# Patient Record
Sex: FEMALE | Race: WHITE | NOT HISPANIC OR LATINO | Employment: OTHER | URBAN - METROPOLITAN AREA
[De-identification: names, ages, dates, MRNs, and addresses within clinical notes are randomized per-mention and may not be internally consistent; named-entity substitution may affect disease eponyms.]

---

## 2017-04-12 RX ORDER — LEVOTHYROXINE SODIUM 50 UG/1
TABLET ORAL
Qty: 30 TABLET | Refills: 11 | Status: SHIPPED | OUTPATIENT
Start: 2017-04-12 | End: 2018-03-21 | Stop reason: SDUPTHER

## 2017-05-22 ENCOUNTER — TELEPHONE (OUTPATIENT)
Dept: OBSTETRICS AND GYNECOLOGY | Facility: CLINIC | Age: 67
End: 2017-05-22

## 2017-05-22 NOTE — TELEPHONE ENCOUNTER
----- Message from Maria D Carver sent at 5/22/2017 11:07 AM CDT -----  Contact: Nathaniel/Robert Mora [phone]  Nathaniel is needing a call back regarding this pt Rx, she can be reached at [phone].

## 2017-06-28 ENCOUNTER — HOSPITAL ENCOUNTER (OUTPATIENT)
Dept: RADIOLOGY | Facility: HOSPITAL | Age: 67
Discharge: HOME | End: 2017-06-28
Attending: INTERNAL MEDICINE
Payer: COMMERCIAL

## 2017-06-28 VITALS — BODY MASS INDEX: 24.8 KG/M2 | WEIGHT: 140 LBS | HEIGHT: 63 IN

## 2017-06-28 DIAGNOSIS — Z12.31 VISIT FOR SCREENING MAMMOGRAM: ICD-10-CM

## 2017-06-28 DIAGNOSIS — Z00.00 ROUTINE GENERAL MEDICAL EXAMINATION AT A HEALTH CARE FACILITY: ICD-10-CM

## 2017-06-28 PROCEDURE — 77067 SCR MAMMO BI INCL CAD: CPT | Mod: 26,,, | Performed by: RADIOLOGY

## 2017-06-28 PROCEDURE — 77067 SCR MAMMO BI INCL CAD: CPT | Mod: TC

## 2017-06-28 PROCEDURE — 77063 BREAST TOMOSYNTHESIS BI: CPT | Mod: 26,,, | Performed by: RADIOLOGY

## 2017-06-30 RX ORDER — ESTRADIOL 2 MG/1
TABLET ORAL
Qty: 30 TABLET | Refills: 3 | Status: SHIPPED | OUTPATIENT
Start: 2017-06-30 | End: 2017-10-29 | Stop reason: SDUPTHER

## 2017-07-28 DIAGNOSIS — Z12.11 COLON CANCER SCREENING: ICD-10-CM

## 2017-08-25 RX ORDER — ATORVASTATIN CALCIUM 40 MG/1
TABLET, FILM COATED ORAL
Qty: 90 TABLET | Refills: 0 | Status: SHIPPED | OUTPATIENT
Start: 2017-08-25 | End: 2017-11-22 | Stop reason: SDUPTHER

## 2017-09-14 ENCOUNTER — CLINICAL SUPPORT (OUTPATIENT)
Dept: INTERNAL MEDICINE | Facility: CLINIC | Age: 67
End: 2017-09-14
Payer: COMMERCIAL

## 2017-09-14 ENCOUNTER — OFFICE VISIT (OUTPATIENT)
Dept: INTERNAL MEDICINE | Facility: CLINIC | Age: 67
End: 2017-09-14
Payer: COMMERCIAL

## 2017-09-14 ENCOUNTER — CLINICAL SUPPORT (OUTPATIENT)
Dept: INFECTIOUS DISEASES | Facility: CLINIC | Age: 67
End: 2017-09-14
Payer: COMMERCIAL

## 2017-09-14 ENCOUNTER — TELEPHONE (OUTPATIENT)
Dept: INTERNAL MEDICINE | Facility: CLINIC | Age: 67
End: 2017-09-14

## 2017-09-14 VITALS
BODY MASS INDEX: 25.76 KG/M2 | DIASTOLIC BLOOD PRESSURE: 82 MMHG | WEIGHT: 145.38 LBS | SYSTOLIC BLOOD PRESSURE: 130 MMHG | HEART RATE: 65 BPM | HEIGHT: 63 IN

## 2017-09-14 DIAGNOSIS — Z00.00 ROUTINE GENERAL MEDICAL EXAMINATION AT A HEALTH CARE FACILITY: Primary | ICD-10-CM

## 2017-09-14 DIAGNOSIS — Z23 NEED FOR VACCINATION: Primary | ICD-10-CM

## 2017-09-14 DIAGNOSIS — E03.9 HYPOTHYROIDISM, UNSPECIFIED TYPE: ICD-10-CM

## 2017-09-14 DIAGNOSIS — Z12.11 COLON CANCER SCREENING: ICD-10-CM

## 2017-09-14 DIAGNOSIS — E28.39 ESTROGEN DEFICIENCY: ICD-10-CM

## 2017-09-14 DIAGNOSIS — Z00.00 ANNUAL PHYSICAL EXAM: Primary | ICD-10-CM

## 2017-09-14 LAB
ALBUMIN SERPL BCP-MCNC: 3.6 G/DL
ALP SERPL-CCNC: 89 U/L
ALT SERPL W/O P-5'-P-CCNC: 13 U/L
ANION GAP SERPL CALC-SCNC: 9 MMOL/L
AST SERPL-CCNC: 18 U/L
BILIRUB SERPL-MCNC: 0.7 MG/DL
BUN SERPL-MCNC: 21 MG/DL
CALCIUM SERPL-MCNC: 9.6 MG/DL
CHLORIDE SERPL-SCNC: 104 MMOL/L
CHOLEST SERPL-MCNC: 203 MG/DL
CHOLEST/HDLC SERPL: 2.9 {RATIO}
CO2 SERPL-SCNC: 27 MMOL/L
CREAT SERPL-MCNC: 1.1 MG/DL
ERYTHROCYTE [DISTWIDTH] IN BLOOD BY AUTOMATED COUNT: 13.5 %
EST. GFR  (AFRICAN AMERICAN): >60 ML/MIN/1.73 M^2
EST. GFR  (NON AFRICAN AMERICAN): 52.8 ML/MIN/1.73 M^2
ESTIMATED AVG GLUCOSE: 105 MG/DL
GLUCOSE SERPL-MCNC: 96 MG/DL
HBA1C MFR BLD HPLC: 5.3 %
HCT VFR BLD AUTO: 38.6 %
HDLC SERPL-MCNC: 71 MG/DL
HDLC SERPL: 35 %
HGB BLD-MCNC: 12.8 G/DL
LDLC SERPL CALC-MCNC: 107.4 MG/DL
MCH RBC QN AUTO: 30.3 PG
MCHC RBC AUTO-ENTMCNC: 33.2 G/DL
MCV RBC AUTO: 92 FL
NONHDLC SERPL-MCNC: 132 MG/DL
PLATELET # BLD AUTO: 303 K/UL
PMV BLD AUTO: 9.7 FL
POTASSIUM SERPL-SCNC: 4.1 MMOL/L
PROT SERPL-MCNC: 7.2 G/DL
RBC # BLD AUTO: 4.22 M/UL
SODIUM SERPL-SCNC: 140 MMOL/L
TRIGL SERPL-MCNC: 123 MG/DL
TSH SERPL DL<=0.005 MIU/L-ACNC: 1.7 UIU/ML
WBC # BLD AUTO: 6.53 K/UL

## 2017-09-14 PROCEDURE — 80061 LIPID PANEL: CPT

## 2017-09-14 PROCEDURE — 99999 PR PBB SHADOW E&M-EST. PATIENT-LVL I: CPT | Mod: PBBFAC,,,

## 2017-09-14 PROCEDURE — 99397 PER PM REEVAL EST PAT 65+ YR: CPT | Mod: S$GLB,ICN,, | Performed by: INTERNAL MEDICINE

## 2017-09-14 PROCEDURE — 90471 IMMUNIZATION ADMIN: CPT | Mod: S$GLB,,, | Performed by: INTERNAL MEDICINE

## 2017-09-14 PROCEDURE — 36415 COLL VENOUS BLD VENIPUNCTURE: CPT

## 2017-09-14 PROCEDURE — 83036 HEMOGLOBIN GLYCOSYLATED A1C: CPT

## 2017-09-14 PROCEDURE — 80053 COMPREHEN METABOLIC PANEL: CPT

## 2017-09-14 PROCEDURE — 84443 ASSAY THYROID STIM HORMONE: CPT

## 2017-09-14 PROCEDURE — 90472 IMMUNIZATION ADMIN EACH ADD: CPT | Mod: S$GLB,,, | Performed by: INTERNAL MEDICINE

## 2017-09-14 PROCEDURE — 90662 IIV NO PRSV INCREASED AG IM: CPT | Mod: S$GLB,,, | Performed by: INTERNAL MEDICINE

## 2017-09-14 PROCEDURE — 99999 PR PBB SHADOW E&M-EST. PATIENT-LVL III: CPT | Mod: PBBFAC,,, | Performed by: INTERNAL MEDICINE

## 2017-09-14 PROCEDURE — 90670 PCV13 VACCINE IM: CPT | Mod: S$GLB,,, | Performed by: INTERNAL MEDICINE

## 2017-09-14 PROCEDURE — 85027 COMPLETE CBC AUTOMATED: CPT

## 2017-09-14 NOTE — LETTER
September 19, 2017    Mercedes Aviles  [address] [place] [address]             [facility]  [address]  [facility] [address]  Phone: [phone]  Fax: [phone] Dear Mrs. Aviles:    Thank you for allowing me to serve you and perform your Executive Health exam on 9/14/2017.  This letter will serve a brief summary of the history, physical findings, and laboratory/studies performed and recommendations at that time.    Reason for Visit: Executive Health Preventive Physical Examination    Past Medical History:   Diagnosis Date    Hyperlipidemia     Hypertension     Meniere disease     Psoriasis        Past Surgical History:   Procedure Laterality Date    CARPAL TUNNEL RELEASE      right     EAR MASTOIDECTOMY W/ COCHLEAR IMPLANT W/ LANDMARK      HYSTERECTOMY      in her 40's    tarsal tunnel      right foot     TONSILLECTOMY         Family History   Problem Relation Age of Onset    Psoriasis Maternal Grandmother     Hypertension Mother     Dementia Mother     Hypertension Sister     Hyperlipidemia Sister     Cancer Sister      uterus/cx    No Known Problems Son     Hyperlipidemia Daughter     Arthritis Daughter     Breast cancer Neg Hx     Ovarian cancer Neg Hx        Social History     Social History    Marital status:      Spouse name: N/A    Number of children: 2    Years of education: N/A     Occupational History    ,       self-employed     [employer]     Social History Main Topics    Smoking status: Never Smoker    Smokeless tobacco: Never Used    Alcohol use No    Drug use: No    Sexual activity: Yes     Birth control/ protection: None     Other Topics Concern    Are You Pregnant Or Think You May Be? No    Breast-Feeding No     Social History Narrative    Exercise:  .          Review of patient's allergies indicates:   Allergen Reactions    Celebrex [celecoxib]     Simvastatin      Other reaction(s):  Rash         Current Outpatient Prescriptions:     aspirin 81 mg Tab, , Disp: , Rfl:     atorvastatin (LIPITOR) 40 MG tablet, TAKE 1 TABLET BY MOUTH ONCE DAILY, Disp: 90 tablet, Rfl: 0    biotin 5 mg Cap, Take by mouth., Disp: , Rfl:     cholecalciferol, vitamin D3, 2,000 unit Cap, Take by mouth., Disp: , Rfl:     clonazePAM (KLONOPIN) 0.5 MG tablet, Take 0.5 mg by mouth once daily., Disp: , Rfl:     COMPOUND HORMONE REPLACEMENT, Take by mouth once daily. Testosterone 5mg  E4M #30  1 daily 5 refills, Disp: 30 capsule, Rfl: 5    desonide (DESOWEN) 0.05 % cream, PAULINA TO FACE BID, Disp: , Rfl: 3    estradiol (ESTRACE) 2 MG tablet, TAKE 1 TABLET BY MOUTH ONCE DAILY, Disp: 30 tablet, Rfl: 3    levothyroxine (SYNTHROID) 50 MCG tablet, TAKE 1 TABLET(50 MCG) BY MOUTH EVERY DAY, Disp: 30 tablet, Rfl: 11    mometasone 0.1% (ELOCON) 0.1 % cream, PAULINA TO FACE BID, Disp: , Rfl: 3    ranitidine (ZANTAC) 150 MG capsule, , Disp: , Rfl: 11    TESTOSTERONE 5 MG CAP, TAKE 1 CAPSULE(S) BY MOUTH daily, Disp: , Rfl: 4    vitamin E 100 UNIT capsule, Take by mouth., Disp: , Rfl:     BETAHISTINE HCL (BETAHISTINE, BULK,) 100 % Powd, , Disp: , Rfl:     Current Facility-Administered Medications:     pneumoc 13-meena conj-dip cr(PF) 0.5 mL, 0.5 mL, Intramuscular, vaccine x 1 dose, Lashawn Sims MD     Review of Systems  Review of Systems - Negative except stable intermittent vertigo.  Hearing has improved following implantation of cochlear implant.  You complained of arm burising after working in garden.      Physical Exam:  General: General appearance: alert, well appearing, and in no distress.   Skin: Skin exam - normal coloration and turgor, no rashes, no suspicious skin lesions noted.  HEENT: Ears - bilateral TM's and external ear canals normal. , ENT exam reveals - ENT exam normal, no neck nodes or sinus tenderness.   Metallic device posterior to R ear.  Lungs: Chest: clear to auscultation, no wheezes, rales or rhonchi,  symmetric air entry.   Heart: CVS exam: normal rate, regular rhythm, normal S1, S2, no murmurs, rubs, clicks or gallops.   Extremities: Exam of extremities: peripheral pulses normal, no pedal edema, no clubbing or cyanosis    Labs:  Results for orders placed or performed in visit on 09/14/17   Comprehensive metabolic panel   Result Value Ref Range    Sodium 140 136 - 145 mmol/L    Potassium 4.1 3.5 - 5.1 mmol/L    Chloride 104 95 - 110 mmol/L    CO2 27 23 - 29 mmol/L    Glucose 96 70 - 110 mg/dL    BUN, Bld 21 8 - 23 mg/dL    Creatinine 1.1 0.5 - 1.4 mg/dL    Calcium 9.6 8.7 - 10.5 mg/dL    Total Protein 7.2 6.0 - 8.4 g/dL    Albumin 3.6 3.5 - 5.2 g/dL    Total Bilirubin 0.7 0.1 - 1.0 mg/dL    Alkaline Phosphatase 89 55 - 135 U/L    AST 18 10 - 40 U/L    ALT 13 10 - 44 U/L    Anion Gap 9 8 - 16 mmol/L    eGFR if African American >60.0 >60 mL/min/1.73 m^2    eGFR if non  52.8 (A) >60 mL/min/1.73 m^2   CBC Without Differential   Result Value Ref Range    WBC 6.53 3.90 - 12.70 K/uL    RBC 4.22 4.00 - 5.40 M/uL    Hemoglobin 12.8 12.0 - 16.0 g/dL    Hematocrit 38.6 37.0 - 48.5 %    MCV 92 82 - 98 fL    MCH 30.3 27.0 - 31.0 pg    MCHC 33.2 32.0 - 36.0 g/dL    RDW 13.5 11.5 - 14.5 %    Platelets 303 150 - 350 K/uL    MPV 9.7 9.2 - 12.9 fL   Lipid panel   Result Value Ref Range    Cholesterol 203 (H) 120 - 199 mg/dL    Triglycerides 123 30 - 150 mg/dL    HDL 71 40 - 75 mg/dL    LDL Cholesterol 107.4 63.0 - 159.0 mg/dL    HDL/Chol Ratio 35.0 20.0 - 50.0 %    Total Cholesterol/HDL Ratio 2.9 2.0 - 5.0    Non-HDL Cholesterol 132 mg/dL   TSH   Result Value Ref Range    TSH 1.695 0.400 - 4.000 uIU/mL   Hemoglobin A1c   Result Value Ref Range    Hemoglobin A1C 5.3 4.0 - 5.6 %    Estimated Avg Glucose 105 68 - 131 mg/dL        Assessment/Recommendations:  Routine Health Maintenance    At this time, you appear to be in good medical condition.  Labs were all fine, including potassium.  Fluvax and prevnar vaccine were  administered and you were advised to have shingles and tetanus vaccines in about 1 month.   Bone density scan and colonoscopy were ordered.    I look forward to seeing you again next year.  Please contact me should you have any questions or concerns regarding physical findings, or my recommendations.    If you have any questions or concerns, please don't hesitate to call.    Sincerely,    Lashawn Sims MD

## 2017-09-14 NOTE — PROGRESS NOTES
Subjective:       Patient ID: Mercedes Aviles is a 67 y.o. female.    Chief Complaint: Executive Health    HPI   Now taking aldactone qod for htn and Meniere's.    Had cochlear implant and hearing had improved.  She has intermittent vertigo.     Also takes Klonopin.     She'd like shingles vaccine.  She is also due for prevnar.    She c/o bruising of arms after working in garden.    She has hypothyroidism.       Review of Systems   Constitutional: Negative for fever and unexpected weight change.   HENT: Negative for congestion and postnasal drip.    Respiratory: Negative for chest tightness, shortness of breath and wheezing.    Cardiovascular: Negative for chest pain and leg swelling.   Gastrointestinal: Negative for abdominal pain, anal bleeding, constipation, diarrhea, nausea and vomiting.   Genitourinary: Negative for dysuria and urgency.   Skin: Negative for rash.   Neurological: Negative for headaches.   Psychiatric/Behavioral: Negative for dysphoric mood and sleep disturbance. The patient is not nervous/anxious.        Objective:      Physical Exam   Constitutional: She is oriented to person, place, and time. She appears well-developed and well-nourished. No distress.   HENT:   Head: Normocephalic and atraumatic.   Right Ear: External ear normal.   Left Ear: External ear normal.   Nose: Nose normal.   Mouth/Throat: Oropharynx is clear and moist.   Hardware post to R ear.   Eyes: Conjunctivae and EOM are normal. Pupils are equal, round, and reactive to light. Right eye exhibits no discharge. Left eye exhibits no discharge. No scleral icterus.   Neck: Normal range of motion. Neck supple. No JVD present. No thyromegaly present.   Cardiovascular: Normal rate, regular rhythm and normal heart sounds.  Exam reveals no gallop.    No murmur heard.  Pulmonary/Chest: Effort normal and breath sounds normal. No respiratory distress. She has no wheezes. She has no rales.   Abdominal: Soft. Bowel sounds are normal. She  exhibits no distension and no mass. There is no tenderness. There is no rebound and no guarding.   Musculoskeletal: Normal range of motion. She exhibits no edema or tenderness.   Lymphadenopathy:     She has no cervical adenopathy.   Neurological: She is alert and oriented to person, place, and time. No cranial nerve deficit. Coordination normal.   Skin: Skin is warm and dry. No rash noted.   Psychiatric: She has a normal mood and affect. Her behavior is normal. Judgment and thought content normal.       Results for orders placed or performed in visit on 09/14/17   Comprehensive metabolic panel   Result Value Ref Range    Sodium 140 136 - 145 mmol/L    Potassium 4.1 3.5 - 5.1 mmol/L    Chloride 104 95 - 110 mmol/L    CO2 27 23 - 29 mmol/L    Glucose 96 70 - 110 mg/dL    BUN, Bld 21 8 - 23 mg/dL    Creatinine 1.1 0.5 - 1.4 mg/dL    Calcium 9.6 8.7 - 10.5 mg/dL    Total Protein 7.2 6.0 - 8.4 g/dL    Albumin 3.6 3.5 - 5.2 g/dL    Total Bilirubin 0.7 0.1 - 1.0 mg/dL    Alkaline Phosphatase 89 55 - 135 U/L    AST 18 10 - 40 U/L    ALT 13 10 - 44 U/L    Anion Gap 9 8 - 16 mmol/L    eGFR if African American >60.0 >60 mL/min/1.73 m^2    eGFR if non  52.8 (A) >60 mL/min/1.73 m^2   CBC Without Differential   Result Value Ref Range    WBC 6.53 3.90 - 12.70 K/uL    RBC 4.22 4.00 - 5.40 M/uL    Hemoglobin 12.8 12.0 - 16.0 g/dL    Hematocrit 38.6 37.0 - 48.5 %    MCV 92 82 - 98 fL    MCH 30.3 27.0 - 31.0 pg    MCHC 33.2 32.0 - 36.0 g/dL    RDW 13.5 11.5 - 14.5 %    Platelets 303 150 - 350 K/uL    MPV 9.7 9.2 - 12.9 fL   Lipid panel   Result Value Ref Range    Cholesterol 203 (H) 120 - 199 mg/dL    Triglycerides 123 30 - 150 mg/dL    HDL 71 40 - 75 mg/dL    LDL Cholesterol 107.4 63.0 - 159.0 mg/dL    HDL/Chol Ratio 35.0 20.0 - 50.0 %    Total Cholesterol/HDL Ratio 2.9 2.0 - 5.0    Non-HDL Cholesterol 132 mg/dL     Assessment:       1. Annual physical exam    2. Estrogen deficiency    3. Colon cancer screening    4.  Hypothyroidism, unspecified type        Plan:       Mercedes was seen today for [email or web address].    Diagnoses and all orders for this visit:    Annual physical exam    Estrogen deficiency  -     DXA Bone Density Spine And Hip; Future    Colon cancer screening  -     Case request GI: COLONOSCOPY    Hypothyroidism, unspecified type       tdap , shingles vaccine 1 mo     Fluvax, prevnar now.

## 2017-10-31 RX ORDER — ESTRADIOL 2 MG/1
TABLET ORAL
Qty: 30 TABLET | Refills: 11 | Status: SHIPPED | OUTPATIENT
Start: 2017-10-31 | End: 2018-10-07 | Stop reason: SDUPTHER

## 2017-11-23 RX ORDER — ATORVASTATIN CALCIUM 40 MG/1
TABLET, FILM COATED ORAL
Qty: 90 TABLET | Refills: 3 | Status: SHIPPED | OUTPATIENT
Start: 2017-11-23 | End: 2018-10-17 | Stop reason: SDUPTHER

## 2017-12-01 DIAGNOSIS — R53.83 FATIGUE, UNSPECIFIED TYPE: ICD-10-CM

## 2017-12-01 DIAGNOSIS — N95.1 MENOPAUSAL SYMPTOMS: Primary | ICD-10-CM

## 2018-01-02 ENCOUNTER — LAB VISIT (OUTPATIENT)
Dept: LAB | Facility: HOSPITAL | Age: 68
End: 2018-01-02
Attending: OBSTETRICS & GYNECOLOGY
Payer: COMMERCIAL

## 2018-01-02 DIAGNOSIS — R53.83 FATIGUE, UNSPECIFIED TYPE: ICD-10-CM

## 2018-01-02 DIAGNOSIS — N95.1 MENOPAUSAL SYMPTOMS: ICD-10-CM

## 2018-01-02 LAB
DHEA-S SERPL-MCNC: 43.8 UG/DL
ESTRADIOL SERPL-MCNC: 68 PG/ML
PROGEST SERPL-MCNC: 1.5 NG/ML
T3FREE SERPL-MCNC: 2.3 PG/ML
T4 FREE SERPL-MCNC: 1.21 NG/DL
TSH SERPL DL<=0.005 MIU/L-ACNC: 1.55 UIU/ML

## 2018-01-02 PROCEDURE — 84443 ASSAY THYROID STIM HORMONE: CPT

## 2018-01-02 PROCEDURE — 84402 ASSAY OF FREE TESTOSTERONE: CPT

## 2018-01-02 PROCEDURE — 82670 ASSAY OF TOTAL ESTRADIOL: CPT

## 2018-01-02 PROCEDURE — 84439 ASSAY OF FREE THYROXINE: CPT

## 2018-01-02 PROCEDURE — 82627 DEHYDROEPIANDROSTERONE: CPT

## 2018-01-02 PROCEDURE — 84144 ASSAY OF PROGESTERONE: CPT

## 2018-01-02 PROCEDURE — 84481 FREE ASSAY (FT-3): CPT

## 2018-01-02 PROCEDURE — 36415 COLL VENOUS BLD VENIPUNCTURE: CPT | Mod: PO

## 2018-01-05 LAB — TESTOST FREE SERPL-MCNC: 0.8 PG/ML

## 2018-02-05 ENCOUNTER — OFFICE VISIT (OUTPATIENT)
Dept: OBSTETRICS AND GYNECOLOGY | Facility: CLINIC | Age: 68
End: 2018-02-05
Attending: OBSTETRICS & GYNECOLOGY
Payer: COMMERCIAL

## 2018-02-05 VITALS
SYSTOLIC BLOOD PRESSURE: 128 MMHG | DIASTOLIC BLOOD PRESSURE: 82 MMHG | BODY MASS INDEX: 26.64 KG/M2 | HEIGHT: 63 IN | WEIGHT: 150.38 LBS

## 2018-02-05 DIAGNOSIS — Z01.419 WELL WOMAN EXAM WITH ROUTINE GYNECOLOGICAL EXAM: Primary | ICD-10-CM

## 2018-02-05 DIAGNOSIS — Z12.4 PAP SMEAR FOR CERVICAL CANCER SCREENING: ICD-10-CM

## 2018-02-05 DIAGNOSIS — N39.3 STRESS INCONTINENCE: ICD-10-CM

## 2018-02-05 PROCEDURE — 99397 PER PM REEVAL EST PAT 65+ YR: CPT | Mod: S$GLB,,, | Performed by: OBSTETRICS & GYNECOLOGY

## 2018-02-05 PROCEDURE — 88175 CYTOPATH C/V AUTO FLUID REDO: CPT

## 2018-02-05 NOTE — Clinical Note
Mallory Mcfarland, I referred this pt to you .  Also offered urogyn but I think she is more interested in your procedure.  Wasn't sure of cost but I told her 2 to 3 K.  She seemed ok with this.  She is perfect candidate.  See my note.  Is on HRT

## 2018-02-05 NOTE — PROGRESS NOTES
Subjective:       Patient ID: Mercedes Aviles is a 67 y.o. female.    Chief Complaint:  Well Woman      History of Present Illness  HPI  This 67 yr old P2 female is here for routine exam.  She does well on Estrace 2mg and provera.  She has had a hyst and last pap was in .  She is up to date with mammogram and will do pap. Her daughter is my patient too and granddaughter is coming in soon.  Her only complaint today is mild to moderate stress incont (she does not wear a pad) and some urge but very mild incont.  We discussed the [facility] Touch and gave infor for Dr Morris and laser.  She understands is cash pay.  I also offererd for her to see Urogyn here and she will think about.  No bleeding. GYN & OB History  Patient's last menstrual period was 2012.   Date of Last Pap: 2014    OB History    Para Term  AB Living   2 2 2         SAB TAB Ectopic Multiple Live Births                  # Outcome Date GA Lbr Pipe/2nd Weight Sex Delivery Anes PTL Lv   2 Term            1 Term                   Review of Systems  Review of Systems   Constitutional: Negative for chills and fever.   Respiratory: Negative for shortness of breath.    Cardiovascular: Negative for chest pain.   Gastrointestinal: Negative for abdominal pain, nausea and vomiting.   Genitourinary: Negative for difficulty urinating, dyspareunia, genital sores, menstrual problem, pelvic pain, vaginal bleeding, vaginal discharge and vaginal pain.   Skin: Negative for wound.   Hematological: Negative for adenopathy.           Objective:    Physical Exam:   Constitutional: She is oriented to person, place, and time. She appears well-developed and well-nourished.    HENT:   Head: Normocephalic.    Eyes: EOM are normal.    Neck: Normal range of motion.    Cardiovascular: Normal rate.     Pulmonary/Chest: Effort normal. She exhibits no mass and no tenderness. Right breast exhibits no inverted nipple, no mass, no skin change and no tenderness.  Left breast exhibits no inverted nipple, no mass, no skin change and no tenderness.        Abdominal: Soft. She exhibits no distension. There is no tenderness.     Genitourinary: Vagina normal. There is no rash, tenderness or lesion on the right labia. There is no rash, tenderness or lesion on the left labia. Uterus is absent. Uterus is not tender. Cervix is normal. Right adnexum displays no mass, no tenderness and no fullness. Left adnexum displays no mass, no tenderness and no fullness. Cervix exhibits no discharge.   Genitourinary Comments: Well estrogenated and good support.  Some mild cystocele with mild U/V angle movement with valsalva.  Pap done.           Musculoskeletal: Normal range of motion.       Neurological: She is alert and oriented to person, place, and time.    Skin: Skin is warm and dry.    Psychiatric: She has a normal mood and affect.          Assessment:        1. Well woman exam with routine gynecological exam    2. Pap smear for cervical cancer screening    3. Stress incontinence               Plan:      Pap today.  Refer to urogyn/Dr Morris at [facility]  Mammogram yearly (she is low risk)  Follow up with me yearly for HRT discussion  Continue current regimen.

## 2018-02-09 ENCOUNTER — TELEPHONE (OUTPATIENT)
Dept: OBSTETRICS AND GYNECOLOGY | Facility: CLINIC | Age: 68
End: 2018-02-09

## 2018-02-09 DIAGNOSIS — B37.31 YEAST VAGINITIS: Primary | ICD-10-CM

## 2018-02-09 RX ORDER — FLUCONAZOLE 150 MG/1
150 TABLET ORAL ONCE
Qty: 1 TABLET | Refills: 1 | Status: SHIPPED | OUTPATIENT
Start: 2018-02-09 | End: 2018-02-09

## 2018-03-21 RX ORDER — LEVOTHYROXINE SODIUM 50 UG/1
TABLET ORAL
Qty: 30 TABLET | Refills: 11 | Status: SHIPPED | OUTPATIENT
Start: 2018-03-21 | End: 2019-04-01 | Stop reason: SDUPTHER

## 2018-07-06 DIAGNOSIS — Z12.11 SPECIAL SCREENING FOR MALIGNANT NEOPLASMS, COLON: Primary | ICD-10-CM

## 2018-07-06 DIAGNOSIS — Z12.11 COLON CANCER SCREENING: ICD-10-CM

## 2018-07-06 DIAGNOSIS — Z00.00 ROUTINE GENERAL MEDICAL EXAMINATION AT A HEALTH CARE FACILITY: Primary | ICD-10-CM

## 2018-07-06 RX ORDER — SODIUM, POTASSIUM,MAG SULFATES 17.5-3.13G
SOLUTION, RECONSTITUTED, ORAL ORAL
Qty: 1 BOTTLE | Refills: 0 | Status: ON HOLD | OUTPATIENT
Start: 2018-07-06 | End: 2018-08-10 | Stop reason: ALTCHOICE

## 2018-08-10 ENCOUNTER — ANESTHESIA (OUTPATIENT)
Dept: ENDOSCOPY | Facility: HOSPITAL | Age: 68
End: 2018-08-10
Payer: COMMERCIAL

## 2018-08-10 ENCOUNTER — ANESTHESIA EVENT (OUTPATIENT)
Dept: ENDOSCOPY | Facility: HOSPITAL | Age: 68
End: 2018-08-10
Payer: COMMERCIAL

## 2018-08-10 ENCOUNTER — HOSPITAL ENCOUNTER (OUTPATIENT)
Facility: HOSPITAL | Age: 68
Discharge: HOME | End: 2018-08-10
Attending: INTERNAL MEDICINE | Admitting: INTERNAL MEDICINE
Payer: COMMERCIAL

## 2018-08-10 ENCOUNTER — SURGERY (OUTPATIENT)
Age: 68
End: 2018-08-10

## 2018-08-10 VITALS
DIASTOLIC BLOOD PRESSURE: 72 MMHG | TEMPERATURE: 98 F | OXYGEN SATURATION: 96 % | RESPIRATION RATE: 17 BRPM | HEIGHT: 62 IN | WEIGHT: 145 LBS | HEART RATE: 57 BPM | BODY MASS INDEX: 26.68 KG/M2 | SYSTOLIC BLOOD PRESSURE: 118 MMHG

## 2018-08-10 DIAGNOSIS — Z12.11 COLON CANCER SCREENING: Primary | ICD-10-CM

## 2018-08-10 PROCEDURE — 25000003 PHARM REV CODE 250: Performed by: INTERNAL MEDICINE

## 2018-08-10 PROCEDURE — G0121 COLON CA SCRN NOT HI RSK IND: HCPCS | Mod: ,,, | Performed by: INTERNAL MEDICINE

## 2018-08-10 PROCEDURE — 37000009 HC ANESTHESIA EA ADD 15 MINS: Performed by: INTERNAL MEDICINE

## 2018-08-10 PROCEDURE — E9220 PRA ENDO ANESTHESIA: HCPCS | Mod: ,,, | Performed by: NURSE ANESTHETIST, CERTIFIED REGISTERED

## 2018-08-10 PROCEDURE — 63600175 PHARM REV CODE 636 W HCPCS: Performed by: NURSE ANESTHETIST, CERTIFIED REGISTERED

## 2018-08-10 PROCEDURE — 37000008 HC ANESTHESIA 1ST 15 MINUTES: Performed by: INTERNAL MEDICINE

## 2018-08-10 PROCEDURE — G0121 COLON CA SCRN NOT HI RSK IND: HCPCS | Performed by: INTERNAL MEDICINE

## 2018-08-10 RX ORDER — PROPOFOL 10 MG/ML
VIAL (ML) INTRAVENOUS CONTINUOUS PRN
Status: DISCONTINUED | OUTPATIENT
Start: 2018-08-10 | End: 2018-08-10

## 2018-08-10 RX ORDER — SODIUM CHLORIDE 0.9 % (FLUSH) 0.9 %
3 SYRINGE (ML) INJECTION
Status: DISCONTINUED | OUTPATIENT
Start: 2018-08-10 | End: 2018-08-10 | Stop reason: HOSPADM

## 2018-08-10 RX ORDER — LIDOCAINE HCL/PF 100 MG/5ML
SYRINGE (ML) INTRAVENOUS
Status: DISCONTINUED | OUTPATIENT
Start: 2018-08-10 | End: 2018-08-10

## 2018-08-10 RX ORDER — PROPOFOL 10 MG/ML
VIAL (ML) INTRAVENOUS
Status: DISCONTINUED | OUTPATIENT
Start: 2018-08-10 | End: 2018-08-10

## 2018-08-10 RX ORDER — SPIRONOLACTONE 25 MG/1
25 TABLET ORAL EVERY MORNING
COMMUNITY

## 2018-08-10 RX ORDER — SODIUM CHLORIDE 9 MG/ML
INJECTION, SOLUTION INTRAVENOUS CONTINUOUS
Status: DISCONTINUED | OUTPATIENT
Start: 2018-08-10 | End: 2018-08-10 | Stop reason: HOSPADM

## 2018-08-10 RX ADMIN — PROPOFOL 30 MG: 10 INJECTION, EMULSION INTRAVENOUS at 09:08

## 2018-08-10 RX ADMIN — PROPOFOL 150 MCG/KG/MIN: 10 INJECTION, EMULSION INTRAVENOUS at 09:08

## 2018-08-10 RX ADMIN — PROPOFOL 80 MG: 10 INJECTION, EMULSION INTRAVENOUS at 09:08

## 2018-08-10 RX ADMIN — LIDOCAINE HYDROCHLORIDE 40 MG: 20 INJECTION, SOLUTION INTRAVENOUS at 09:08

## 2018-08-10 RX ADMIN — SODIUM CHLORIDE: 0.9 INJECTION, SOLUTION INTRAVENOUS at 08:08

## 2018-08-10 NOTE — TRANSFER OF CARE
"Anesthesia Transfer of Care Note    Patient: Mercedes Aviles    Procedure(s) Performed: Procedure(s) (LRB):  COLONOSCOPY (N/A)    Patient location: GI    Anesthesia Type: general    Transport from OR: Transported from OR on room air with adequate spontaneous ventilation    Post pain: adequate analgesia    Post assessment: tolerated procedure well and no apparent anesthetic complications    Post vital signs: stable    Level of consciousness: sedated    Nausea/Vomiting: no nausea/vomiting    Complications: none    Transfer of care protocol was followed      Last vitals:   Visit Vitals  BP (!) 106/56 (BP Location: Left arm, Patient Position: Lying)   Pulse 77   Temp 36.7 °C (98.1 °F) (Temporal)   Resp 17   Ht 5' 2" (1.575 m)   Wt 65.8 kg (145 lb)   LMP 08/08/2012   SpO2 (!) 93%   Breastfeeding? No   BMI 26.52 kg/m²     "

## 2018-08-10 NOTE — H&P
Short Stay Endoscopy History and Physical    PCP - Lashawn Sims MD    Procedure - Colonoscopy  Sedation: GA  ASA - per anesthesia  Mallampati - per anesthesia  History of Anesthesia problems - no  Family history Anesthesia problems -  no     HPI:  This is a 68 y.o. female here for evaluation of : Screening for CRC    Reflux - no  Dysphagia - no  Abdominal pain - no  Diarrhea - no    ROS:  Constitutional: No fevers, chills, No weight loss  ENT: No allergies  CV: No chest pain  Pulm: No cough, No shortness of breath  Ophtho: No vision changes  GI: see HPI  Medical History:  has a past medical history of Hyperlipidemia; Hypertension; Meniere disease; and Psoriasis.    Surgical History:  has a past surgical history that includes Carpal tunnel release; tarsal tunnel; Tonsillectomy; Hysterectomy; and Ear mastoidectomy w/ cochlear implant w/ landmark.    Family History: family history includes Arthritis in her daughter; Cancer in her sister; Dementia in her mother; Hyperlipidemia in her daughter and sister; Hypertension in her mother and sister; No Known Problems in her son; Psoriasis in her maternal grandmother.. Otherwise no colon cancer, inflammatory bowel disease, or GI malignancies.    Social History:  reports that she has never smoked. She has never used smokeless tobacco. She reports that she does not drink alcohol or use drugs.    Review of patient's allergies indicates:   Allergen Reactions    Celebrex [celecoxib]     Simvastatin      Other reaction(s): Rash       Medications:   Facility-Administered Medications Prior to Admission   Medication Dose Route Frequency Provider Last Rate Last Dose    pneumoc 13-meena conj-dip cr(PF) 0.5 mL  0.5 mL Intramuscular vaccine x 1 dose Lashawn Sims MD         Prescriptions Prior to Admission   Medication Sig Dispense Refill Last Dose    aspirin 81 mg Tab    Taking    atorvastatin (LIPITOR) 40 MG tablet TAKE 1 TABLET BY MOUTH ONCE DAILY 90 tablet 3 Taking     BETAHISTINE HCL (BETAHISTINE, BULK,) 100 % Powd    Not Taking    biotin 5 mg Cap Take by mouth.   Not Taking    cholecalciferol, vitamin D3, 2,000 unit Cap Take by mouth.   Not Taking    clonazePAM (KLONOPIN) 0.5 MG tablet Take 0.5 mg by mouth once daily.   Taking    COMPOUND HORMONE REPLACEMENT Take by mouth once daily. Testosterone 5mg  E4M #30  1 daily 5 refills 30 capsule 5 Not Taking    desonide (DESOWEN) 0.05 % cream PAULINA TO FACE BID  3 Not Taking    estradiol (ESTRACE) 2 MG tablet TAKE 1 TABLET BY MOUTH ONCE DAILY 30 tablet 11 Taking    levothyroxine (SYNTHROID) 50 MCG tablet TAKE 1 TABLET(50 MCG) BY MOUTH EVERY DAY 30 tablet 11     mometasone 0.1% (ELOCON) 0.1 % cream PAULINA TO FACE BID  3 Taking    ranitidine (ZANTAC) 150 MG capsule TAKE ONE CAPSULE BY MOUTH TWICE DAILY 60 capsule 11     SUPREP BOWEL PREP KIT 17.5-3.13-1.6 gram SolR Please dispense as directed/ 1 kit 1 Bottle 0     TESTOSTERONE 5 MG CAP TAKE 1 CAPSULE(S) BY MOUTH daily  4 Taking    vitamin E 100 UNIT capsule Take by mouth.   Not Taking       Objective Findings:    Vital Signs: Per nursing notes.    Physical Exam:  General Appearance: Well appearing in no acute distress  Head:   Normocephalic, without obvious abnormality  Eyes:    No scleral icterus  Airway: Open  Neck: No restriction in mobility  Lungs: CTA bilaterally in anterior and posterior fields, no wheezes, no crackles.  Heart:  Regular rate and rhythm, S1, S2 normal, no murmurs heard  Abdomen: Soft, non tender, non distended      Labs:  Lab Results   Component Value Date    WBC 6.53 09/14/2017    HGB 12.8 09/14/2017    HCT 38.6 09/14/2017     09/14/2017    CHOL 203 (H) 09/14/2017    TRIG 123 09/14/2017    HDL 71 09/14/2017    ALT 13 09/14/2017    AST 18 09/14/2017     09/14/2017    K 4.1 09/14/2017     09/14/2017    CREATININE 1.1 09/14/2017    BUN 21 09/14/2017    CO2 27 09/14/2017    TSH 1.549 01/02/2018    HGBA1C 5.3 09/14/2017         I have explained  the risks and benefits of endoscopy procedures to the patient including but not limited to bleeding, perforation, infection, and death.    Thank you so much for allowing me to participate in the care of Mercedes Stoner MD

## 2018-08-10 NOTE — ANESTHESIA POSTPROCEDURE EVALUATION
"Anesthesia Post Evaluation    Patient: Mercedes Aviles    Procedure(s) Performed: Procedure(s) (LRB):  COLONOSCOPY (N/A)    Final Anesthesia Type: general  Patient location during evaluation: PACU  Patient participation: Yes- Able to Participate  Level of consciousness: awake and alert  Post-procedure vital signs: reviewed and stable  Pain management: adequate  Airway patency: patent  PONV status at discharge: No PONV  Anesthetic complications: no      Cardiovascular status: blood pressure returned to baseline  Respiratory status: unassisted  Hydration status: euvolemic  Follow-up not needed.        Visit Vitals  /72 (BP Location: Left arm, Patient Position: Lying)   Pulse (!) 57   Temp 36.7 °C (98.1 °F) (Temporal)   Resp 17   Ht 5' 2" (1.575 m)   Wt 65.8 kg (145 lb)   LMP 08/08/2012   SpO2 96%   Breastfeeding? No   BMI 26.52 kg/m²       Pain/Troy Score: Presence of Pain: denies (8/10/2018 10:08 AM)  Troy Score: 10 (8/10/2018 10:08 AM)      "

## 2018-08-10 NOTE — [employer] PATIENT INSTRUCTIONS
Discharge Summary/Instructions after an Endoscopic Procedure  Patient Name: Mercedes Aviles  Patient MRN: [id number]  Patient YOB: 1950  Friday, August 10, 2018  Stiven Stoner MD  RESTRICTIONS:  During your procedure today, you received medications for sedation.  These   medications may affect your judgment, balance and coordination.  Therefore,   for 24 hours, you have the following restrictions:   - DO NOT drive a car, operate machinery, make legal/financial decisions,   sign important papers or drink alcohol.    ACTIVITY:  Today: no heavy lifting, straining or running due to procedural   sedation/anesthesia.  The following day: return to full activity including work.  DIET:  Eat and drink normally unless instructed otherwise.     TREATMENT FOR COMMON SIDE EFFECTS:  - Mild abdominal pain, nausea, belching, bloating or excessive gas:  rest,   eat lightly and use a heating pad.  - Sore Throat: treat with throat lozenges and/or gargle with warm salt   water.  - Because air was used during the procedure, expelling large amounts of air   from your rectum or belching is normal.  - If a bowel prep was taken, you may not have a bowel movement for 1-3 days.    This is normal.  SYMPTOMS TO WATCH FOR AND REPORT TO YOUR PHYSICIAN:  1. Abdominal pain or bloating, other than gas cramps.  2. Chest pain.  3. Back pain.  4. Signs of infection such as: chills or fever occurring within 24 hours   after the procedure.  5. Rectal bleeding, which would show as bright red, maroon, or black stools.   (A tablespoon of blood from the rectum is not serious, especially if   hemorrhoids are present.)  6. Vomiting.  7. Weakness or dizziness.  GO DIRECTLY TO THE NEAREST EMERGENCY ROOM IF YOU HAVE ANY OF THE FOLLOWING:      Difficulty breathing              Chills and/or fever over 101 F   Persistent vomiting and/or vomiting blood   Severe abdominal pain   Severe chest pain   Black, tarry stools   Bleeding- more than one  tablespoon   Any other symptom or condition that you feel may need urgent attention  Your doctor recommends these additional instructions:  If any biopsies were taken, your doctors clinic will contact you in 1 to 2   weeks with any results.  - Patient has a contact number available for emergencies.  The signs and   symptoms of potential delayed complications were discussed with the   patient.  Return to normal activities tomorrow.  Written discharge   instructions were provided to the patient.   - Discharge patient to home.   - Resume previous diet.   - Continue present medications.   - Repeat colonoscopy in 10 years for screening purposes.  For questions, problems or results please call your physician - Stiven Stoner MD at Work:  [phone].  [facility], EMERGENCY ROOM PHONE NUMBER: [phone]  IF A COMPLICATION OR EMERGENCY SITUATION ARISES AND YOU ARE UNABLE TO REACH   YOUR PHYSICIAN - GO DIRECTLY TO THE EMERGENCY ROOM.  Stiven Stoner MD  8/10/2018 9:38:06 AM  This report has been verified and signed electronically.  [employer]

## 2018-08-10 NOTE — PLAN OF CARE
Discharge instructions given including diet, s/s to notify MD or return to ED and follow up. Pt verbalized understanding. Pt refused wheelchair. Ambulated off unit with family. Steady gait. No distress noted. Denies pain. Denies n/v

## 2018-08-10 NOTE — ANESTHESIA PREPROCEDURE EVALUATION
Past Medical History:   Diagnosis Date    Hyperlipidemia     Hypertension     Meniere disease     Psoriasis      Past Surgical History:   Procedure Laterality Date    CARPAL TUNNEL RELEASE      right     EAR MASTOIDECTOMY W/ COCHLEAR IMPLANT W/ LANDMARK      HYSTERECTOMY      in her 40's    tarsal tunnel      right foot     TONSILLECTOMY       Patient Active Problem List   Diagnosis    Anxiety    Menopause syndrome    Allergic contact dermatitis    Hypothyroidism    Meniere disease     There is no height or weight on file to calculate BMI.  2D Echo:  No results found for this or any previous visit.    Please See ROS/PMH and Active Problem List above                                                                                                               08/10/2018  Mercedes Aviles is a 68 y.o., female.    Anesthesia Evaluation    I have reviewed the Patient Summary Reports.    I have reviewed the Nursing Notes.      Review of Systems  Anesthesia Hx:  Neg history of prior surgery. Denies Family Hx of Anesthesia complications.   Denies Personal Hx of Anesthesia complications.   Cardiovascular:   Exercise tolerance: good    Hepatic/GI:   Bowel Prep.        Physical Exam  General:  Well nourished    Airway/Jaw/Neck:  AIRWAY FINDINGS: Normal       Dental:  DENTAL FINDINGS: Normal   Chest/Lungs:  Chest/Lungs Clear    Heart/Vascular:  Heart Findings: Normal       Mental Status:  Mental Status Findings:  Cooperative, Alert and Oriented         Anesthesia Plan  Type of Anesthesia, risks & benefits discussed:  Anesthesia Type:  general  Patient's Preference: general  Intra-op Monitoring Plan: standard ASA monitors  Intra-op Monitoring Plan Comments:   Post Op Pain Control Plan:   Post Op Pain Control Plan Comments:   Induction:   IV  Beta Blocker:  Patient is not currently on a Beta-Blocker (No further documentation required).       Informed Consent: Patient understands risks and agrees with Anesthesia plan.   Questions answered. Anesthesia consent signed with patient.  ASA Score: 2     Day of Surgery Review of History & Physical:            Ready For Surgery From Anesthesia Perspective.

## 2018-08-17 ENCOUNTER — TELEPHONE (OUTPATIENT)
Dept: ENDOSCOPY | Facility: HOSPITAL | Age: 68
End: 2018-08-17

## 2018-09-18 ENCOUNTER — HOSPITAL ENCOUNTER (OUTPATIENT)
Dept: RADIOLOGY | Facility: HOSPITAL | Age: 68
Discharge: HOME | End: 2018-09-18
Attending: INTERNAL MEDICINE
Payer: COMMERCIAL

## 2018-09-18 ENCOUNTER — OFFICE VISIT (OUTPATIENT)
Dept: INTERNAL MEDICINE | Facility: CLINIC | Age: 68
End: 2018-09-18
Payer: COMMERCIAL

## 2018-09-18 ENCOUNTER — HOSPITAL ENCOUNTER (OUTPATIENT)
Dept: CARDIOLOGY | Facility: CLINIC | Age: 68
Discharge: HOME | End: 2018-09-18
Attending: INTERNAL MEDICINE
Payer: COMMERCIAL

## 2018-09-18 ENCOUNTER — CLINICAL SUPPORT (OUTPATIENT)
Dept: INTERNAL MEDICINE | Facility: CLINIC | Age: 68
End: 2018-09-18
Attending: INTERNAL MEDICINE
Payer: COMMERCIAL

## 2018-09-18 VITALS
HEART RATE: 64 BPM | WEIGHT: 145.69 LBS | SYSTOLIC BLOOD PRESSURE: 134 MMHG | HEIGHT: 63 IN | OXYGEN SATURATION: 98 % | DIASTOLIC BLOOD PRESSURE: 86 MMHG | BODY MASS INDEX: 25.81 KG/M2

## 2018-09-18 DIAGNOSIS — N18.30 CKD (CHRONIC KIDNEY DISEASE) STAGE 3, GFR 30-59 ML/MIN: ICD-10-CM

## 2018-09-18 DIAGNOSIS — Z00.00 ROUTINE GENERAL MEDICAL EXAMINATION AT A HEALTH CARE FACILITY: ICD-10-CM

## 2018-09-18 DIAGNOSIS — Z00.00 ANNUAL PHYSICAL EXAM: Primary | ICD-10-CM

## 2018-09-18 DIAGNOSIS — F43.9 SITUATIONAL STRESS: ICD-10-CM

## 2018-09-18 DIAGNOSIS — D64.9 ANEMIA, UNSPECIFIED TYPE: ICD-10-CM

## 2018-09-18 DIAGNOSIS — R51.9 NONINTRACTABLE HEADACHE, UNSPECIFIED CHRONICITY PATTERN, UNSPECIFIED HEADACHE TYPE: ICD-10-CM

## 2018-09-18 DIAGNOSIS — R06.83 SNORING: ICD-10-CM

## 2018-09-18 DIAGNOSIS — R40.0 SOMNOLENCE: ICD-10-CM

## 2018-09-18 DIAGNOSIS — E03.9 HYPOTHYROIDISM, UNSPECIFIED TYPE: ICD-10-CM

## 2018-09-18 DIAGNOSIS — Z00.00 ROUTINE GENERAL MEDICAL EXAMINATION AT A HEALTH CARE FACILITY: Primary | ICD-10-CM

## 2018-09-18 DIAGNOSIS — I10 HYPERTENSION, UNSPECIFIED TYPE: ICD-10-CM

## 2018-09-18 DIAGNOSIS — N95.9 MENOPAUSAL DISORDER: ICD-10-CM

## 2018-09-18 DIAGNOSIS — E78.5 HYPERLIPIDEMIA, UNSPECIFIED HYPERLIPIDEMIA TYPE: ICD-10-CM

## 2018-09-18 LAB
25(OH)D3+25(OH)D2 SERPL-MCNC: 47 NG/ML
ALBUMIN SERPL BCP-MCNC: 3.7 G/DL
ALP SERPL-CCNC: 81 U/L
ALT SERPL W/O P-5'-P-CCNC: 13 U/L
ANION GAP SERPL CALC-SCNC: 7 MMOL/L
AST SERPL-CCNC: 19 U/L
BACTERIA #/AREA URNS AUTO: ABNORMAL /HPF
BILIRUB SERPL-MCNC: 1 MG/DL
BILIRUB UR QL STRIP: NEGATIVE
BUN SERPL-MCNC: 26 MG/DL
CALCIUM SERPL-MCNC: 9.4 MG/DL
CHLORIDE SERPL-SCNC: 109 MMOL/L
CHOLEST SERPL-MCNC: 189 MG/DL
CHOLEST/HDLC SERPL: 3 {RATIO}
CLARITY UR REFRACT.AUTO: ABNORMAL
CO2 SERPL-SCNC: 24 MMOL/L
COLOR UR AUTO: YELLOW
CREAT SERPL-MCNC: 1.2 MG/DL
CREAT UR-MCNC: 317 MG/DL
DIASTOLIC DYSFUNCTION: NO
ERYTHROCYTE [DISTWIDTH] IN BLOOD BY AUTOMATED COUNT: 13.2 %
EST. GFR  (AFRICAN AMERICAN): 54.4 ML/MIN/1.73 M^2
EST. GFR  (NON AFRICAN AMERICAN): 47.2 ML/MIN/1.73 M^2
ESTIMATED AVG GLUCOSE: 111 MG/DL
FERRITIN SERPL-MCNC: 28 NG/ML
GLUCOSE SERPL-MCNC: 90 MG/DL
GLUCOSE UR QL STRIP: NEGATIVE
HBA1C MFR BLD HPLC: 5.5 %
HCT VFR BLD AUTO: 38.2 %
HDLC SERPL-MCNC: 64 MG/DL
HDLC SERPL: 33.9 %
HGB BLD-MCNC: 11.9 G/DL
HGB UR QL STRIP: NEGATIVE
IRON SERPL-MCNC: 114 UG/DL
KETONES UR QL STRIP: NEGATIVE
LDLC SERPL CALC-MCNC: 100.4 MG/DL
LEUKOCYTE ESTERASE UR QL STRIP: NEGATIVE
MCH RBC QN AUTO: 30.1 PG
MCHC RBC AUTO-ENTMCNC: 31.2 G/DL
MCV RBC AUTO: 97 FL
MICROSCOPIC COMMENT: ABNORMAL
NITRITE UR QL STRIP: NEGATIVE
NONHDLC SERPL-MCNC: 125 MG/DL
PH UR STRIP: 5 [PH] (ref 5–8)
PLATELET # BLD AUTO: 307 K/UL
PMV BLD AUTO: 10.4 FL
POTASSIUM SERPL-SCNC: 4.4 MMOL/L
PROT SERPL-MCNC: 7 G/DL
PROT UR QL STRIP: NEGATIVE
PROT UR-MCNC: 14 MG/DL
PROT/CREAT UR: 0.04 MG/G{CREAT}
RBC # BLD AUTO: 3.95 M/UL
RBC #/AREA URNS AUTO: 2 /HPF (ref 0–4)
SATURATED IRON: 27 %
SODIUM SERPL-SCNC: 140 MMOL/L
SP GR UR STRIP: >=1.03 (ref 1–1.03)
SQUAMOUS #/AREA URNS AUTO: 9 /HPF
TOTAL IRON BINDING CAPACITY: 419 UG/DL
TRANSFERRIN SERPL-MCNC: 283 MG/DL
TRIGL SERPL-MCNC: 123 MG/DL
TSH SERPL DL<=0.005 MIU/L-ACNC: 1.34 UIU/ML
URN SPEC COLLECT METH UR: ABNORMAL
UROBILINOGEN UR STRIP-ACNC: NEGATIVE EU/DL
WBC # BLD AUTO: 7.58 K/UL
WBC #/AREA URNS AUTO: 3 /HPF (ref 0–5)
YEAST UR QL AUTO: ABNORMAL

## 2018-09-18 PROCEDURE — 97750 PHYSICAL PERFORMANCE TEST: CPT | Mod: S$GLB,,, | Performed by: INTERNAL MEDICINE

## 2018-09-18 PROCEDURE — 85027 COMPLETE CBC AUTOMATED: CPT

## 2018-09-18 PROCEDURE — 83036 HEMOGLOBIN GLYCOSYLATED A1C: CPT

## 2018-09-18 PROCEDURE — 83540 ASSAY OF IRON: CPT

## 2018-09-18 PROCEDURE — 99999 PR PBB SHADOW E&M-EST. PATIENT-LVL V: CPT | Mod: PBBFAC,,, | Performed by: INTERNAL MEDICINE

## 2018-09-18 PROCEDURE — 3075F SYST BP GE 130 - 139MM HG: CPT | Mod: CPTII,S$GLB,, | Performed by: INTERNAL MEDICINE

## 2018-09-18 PROCEDURE — 77067 SCR MAMMO BI INCL CAD: CPT | Mod: 26,,, | Performed by: RADIOLOGY

## 2018-09-18 PROCEDURE — 99397 PER PM REEVAL EST PAT 65+ YR: CPT | Mod: S$GLB,,, | Performed by: INTERNAL MEDICINE

## 2018-09-18 PROCEDURE — 93015 CV STRESS TEST SUPVJ I&R: CPT | Mod: S$GLB,,, | Performed by: INTERNAL MEDICINE

## 2018-09-18 PROCEDURE — 84156 ASSAY OF PROTEIN URINE: CPT

## 2018-09-18 PROCEDURE — 82728 ASSAY OF FERRITIN: CPT

## 2018-09-18 PROCEDURE — 80061 LIPID PANEL: CPT

## 2018-09-18 PROCEDURE — 3079F DIAST BP 80-89 MM HG: CPT | Mod: CPTII,S$GLB,, | Performed by: INTERNAL MEDICINE

## 2018-09-18 PROCEDURE — 77063 BREAST TOMOSYNTHESIS BI: CPT | Mod: 26,,, | Performed by: RADIOLOGY

## 2018-09-18 PROCEDURE — 82306 VITAMIN D 25 HYDROXY: CPT

## 2018-09-18 PROCEDURE — 81001 URINALYSIS AUTO W/SCOPE: CPT

## 2018-09-18 PROCEDURE — 77063 BREAST TOMOSYNTHESIS BI: CPT | Mod: TC

## 2018-09-18 PROCEDURE — 84443 ASSAY THYROID STIM HORMONE: CPT

## 2018-09-18 PROCEDURE — 80053 COMPREHEN METABOLIC PANEL: CPT

## 2018-09-18 RX ORDER — AMLODIPINE BESYLATE 5 MG/1
5 TABLET ORAL DAILY
Qty: 90 TABLET | Refills: 3 | Status: SHIPPED | OUTPATIENT
Start: 2018-09-18 | End: 2018-10-17 | Stop reason: SDUPTHER

## 2018-09-18 RX ORDER — LANOLIN ALCOHOL/MO/W.PET/CERES
400 CREAM (GRAM) TOPICAL DAILY
COMMUNITY
End: 2020-06-08 | Stop reason: CLARIF

## 2018-09-18 RX ORDER — PNV NO.95/FERROUS FUM/FOLIC AC 28MG-0.8MG
100 TABLET ORAL DAILY
COMMUNITY
End: 2020-06-08 | Stop reason: CLARIF

## 2018-09-18 NOTE — PROGRESS NOTES
Subjective:       Patient ID: Mercedes vAiles is a 68 y.o. female.    Chief Complaint: No chief complaint on file.    HPI   Patient has reported hypertension treated with medication. No history of pulmonary disease. Patient chose to defer all testing on the ground because it did not interest her.     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 She stated that she hates the nursing home and that her mother is very mean to her. This has raised her stress level. Patient is not understanding why she is gaining weight and wanted to know how to make it decrease. I encouraged beginning an exercise routine.     Review of Systems    Objective:      The fitness evaluation results are as follows:    D.O.S. 9/18/2018 9/30/2014   Height (in): 62.5 62.5   Weight (lbs): 144 139   BMI: 25.2759010 25   Body Fat (%): 36.03 32.91   Waist (cm): 92 82   Hip (cm): 104 101   WHR: 0.88 0.81   RBP (mmHg): 106/60 N/A   RHR (bpm): 52 N/A    Strength R (lbs)t: 72.0715999 N/A    Strength Lt (lbs): 69.8464121 N/A   Push-up Assessment: Deferred N/A   Curl-up Assessment: Deferred N/A   Flexibility Testing (cm): Deferred N/A   REE (kcals): 1210 1090     Physical Exam    Assessment:      Age/Gender Stratified Assessment:     Resting BP: Within Testing Limits   Body Fat %: Fair   WHR Risk Factor: High Risk    Strength R: Above Average    Strength L: Above Average   Upper Body Endurance: Deferred   Abdominal Endurance: Deferred   Lower body Flexibility: Deferred     1. Routine general medical examination at a health care facility        Plan:    Patient should begin regular exercise following below guidelines.    Recommended Fitness Guidelines:   - 150 minutes of moderate intensity aerobic exercise each week OR 75 minutes of vigorous    - 2-4 days per week of resistance training for each muscle group   - Daily  stretching

## 2018-09-18 NOTE — PROGRESS NOTES
Subjective:       Patient ID: Mercedes Aviles is a 68 y.o. female.    Chief Complaint: Executive Health and other (pt does not sweat)    HPI   Remains very active,  Running [employer] business, working outside 5 days a week.  Also cleans houses.    Pt c/o life-long inability to sweat.  Gets red in face only.  She works landscaping 5 days a week.  Remains     She is happy to report colonoscopy, which showed diverticulosis.    Stressed.  She had to place 92 yo mother in a NH.  Mother is mean, doesn't like new residence.    Didn't get tdap or shingles due to expense.  Also, unable to schedule Dexa.    Long standing htn.      Controlled by outside readings.    Meniere's dz is stable.   Head feels heavy, but no true vertigo.    bmi 26 - plans to start reg exercise.    Snores.  No observed apnea.  Naps daily.  Sleeps well.  Headaches stable for years.   She falls asleep reading, etc.   Falls asleep exceedingly easily.  ckd noted.  Many year h/o htn.  Review of Systems   Constitutional: Negative for fever and unexpected weight change.   HENT: Negative for congestion and postnasal drip.    Respiratory: Negative for chest tightness, shortness of breath and wheezing.    Cardiovascular: Negative for chest pain and leg swelling.   Gastrointestinal: Negative for abdominal pain, anal bleeding, constipation, diarrhea, nausea and vomiting.   Genitourinary: Negative for dysuria and urgency.   Skin: Negative for rash.   Neurological: Positive for headaches (h/o migraines.  occas severe headaches  takes ibupfrofen 400 mg 5 days a week.).   Psychiatric/Behavioral: Negative for dysphoric mood and sleep disturbance. The patient is not nervous/anxious.        Objective:      Physical Exam   Constitutional: She is oriented to person, place, and time. She appears well-developed and well-nourished. No distress.   HENT:   Head: Normocephalic and atraumatic.   Right Ear: External ear normal.   Left Ear: External ear normal.   Nose: Nose  normal.   Mouth/Throat: Oropharynx is clear and moist.   Eyes: Conjunctivae and EOM are normal. Pupils are equal, round, and reactive to light. Right eye exhibits no discharge. Left eye exhibits no discharge. No scleral icterus.   Neck: Normal range of motion. Neck supple. No JVD present. No thyromegaly present.   Cardiovascular: Normal rate, regular rhythm and normal heart sounds. Exam reveals no gallop.   No murmur heard.  Pulmonary/Chest: Effort normal and breath sounds normal. No respiratory distress. She has no wheezes. She has no rales.   Abdominal: Soft. Bowel sounds are normal. She exhibits no distension and no mass. There is no tenderness. There is no rebound and no guarding.   Musculoskeletal: Normal range of motion. She exhibits no edema or tenderness.   Lymphadenopathy:     She has no cervical adenopathy.   Neurological: She is alert and oriented to person, place, and time. No cranial nerve deficit. Coordination normal.   Skin: Skin is warm and dry. No rash noted.   Psychiatric: She has a normal mood and affect. Her behavior is normal. Judgment and thought content normal.       150/82    Results for orders placed or performed during the hospital encounter of 09/18/18   Cardiac stress with EKG monitoring   Result Value Ref Range    Diastolic Dysfunction No       Lab Results   Component Value Date    WBC 7.58 09/18/2018    HGB 11.9 (L) 09/18/2018    HCT 38.2 09/18/2018     09/18/2018    CHOL 189 09/18/2018    TRIG 123 09/18/2018    HDL 64 09/18/2018    ALT 13 09/18/2018    AST 19 09/18/2018     09/18/2018    K 4.4 09/18/2018     09/18/2018    CREATININE 1.2 09/18/2018    BUN 26 (H) 09/18/2018    CO2 24 09/18/2018    TSH 1.336 09/18/2018    HGBA1C 5.5 09/18/2018     Assessment:       1. Annual physical exam    2. Hypothyroidism, unspecified type    3. Hyperlipidemia, unspecified hyperlipidemia type    4. Snoring    5. Somnolence    6. Nonintractable headache, unspecified chronicity  pattern, unspecified headache type    7. CKD (chronic kidney disease) stage 3, GFR 30-59 ml/min    8. Menopausal disorder    9. Anemia, unspecified type    10. Situational stress    11. Hypertension, unspecified type        Plan:       Mercedes was seen today for executive health and other.    Diagnoses and all orders for this visit:    Annual physical exam    Hypothyroidism, unspecified type    Hyperlipidemia, unspecified hyperlipidemia type    Snoring  -     Home Sleep Studies; Future    Somnolence  -     Home Sleep Studies; Future    Nonintractable headache, unspecified chronicity pattern, unspecified headache type    CKD (chronic kidney disease) stage 3, GFR 30-59 ml/min  -     Protein / creatinine ratio, urine  -     US Retroperitoneal Complete (Kidney and; Future  -     Urinalysis    Menopausal disorder  -     DXA Bone Density Spine And Hip; Future    Anemia, unspecified type  -     Iron and TIBC; Future    Other orders  -     ADD amLODIPine (NORVASC) 5 MG tablet; Take 1 tablet (5 mg total) by mouth once daily.    .   Wt loss.    tdap and pneumococcal 23

## 2018-09-21 ENCOUNTER — TELEPHONE (OUTPATIENT)
Dept: SLEEP MEDICINE | Facility: HOSPITAL | Age: 68
End: 2018-09-21

## 2018-09-25 ENCOUNTER — LAB VISIT (OUTPATIENT)
Dept: LAB | Facility: HOSPITAL | Age: 68
End: 2018-09-25
Attending: INTERNAL MEDICINE
Payer: COMMERCIAL

## 2018-09-25 DIAGNOSIS — D64.9 ANEMIA, UNSPECIFIED TYPE: ICD-10-CM

## 2018-09-25 LAB
FERRITIN SERPL-MCNC: 29 NG/ML
IRON SERPL-MCNC: 61 UG/DL
SATURATED IRON: 15 %
TOTAL IRON BINDING CAPACITY: 407 UG/DL
TRANSFERRIN SERPL-MCNC: 275 MG/DL

## 2018-09-25 PROCEDURE — 36415 COLL VENOUS BLD VENIPUNCTURE: CPT | Mod: PO

## 2018-09-25 PROCEDURE — 83540 ASSAY OF IRON: CPT

## 2018-09-25 PROCEDURE — 82728 ASSAY OF FERRITIN: CPT

## 2018-09-26 ENCOUNTER — TELEPHONE (OUTPATIENT)
Dept: SLEEP MEDICINE | Facility: HOSPITAL | Age: 68
End: 2018-09-26

## 2018-10-02 ENCOUNTER — TELEPHONE (OUTPATIENT)
Dept: INTERNAL MEDICINE | Facility: CLINIC | Age: 68
End: 2018-10-02

## 2018-10-02 DIAGNOSIS — D64.9 ANEMIA, UNSPECIFIED TYPE: Primary | ICD-10-CM

## 2018-10-02 NOTE — TELEPHONE ENCOUNTER
She needs bp check in few weeks with cbc, iron studies, ferritin.  She knows - (mailing letter today)

## 2018-10-03 ENCOUNTER — HOSPITAL ENCOUNTER (OUTPATIENT)
Dept: RADIOLOGY | Facility: HOSPITAL | Age: 68
Discharge: HOME | End: 2018-10-03
Attending: INTERNAL MEDICINE
Payer: COMMERCIAL

## 2018-10-03 ENCOUNTER — HOSPITAL ENCOUNTER (OUTPATIENT)
Dept: RADIOLOGY | Facility: CLINIC | Age: 68
Discharge: HOME | End: 2018-10-03
Attending: INTERNAL MEDICINE
Payer: COMMERCIAL

## 2018-10-03 DIAGNOSIS — N95.9 MENOPAUSAL DISORDER: ICD-10-CM

## 2018-10-03 DIAGNOSIS — N18.30 CKD (CHRONIC KIDNEY DISEASE) STAGE 3, GFR 30-59 ML/MIN: ICD-10-CM

## 2018-10-03 PROCEDURE — 76770 US EXAM ABDO BACK WALL COMP: CPT | Mod: 26,,, | Performed by: RADIOLOGY

## 2018-10-03 PROCEDURE — 77080 DXA BONE DENSITY AXIAL: CPT | Mod: 26,,, | Performed by: INTERNAL MEDICINE

## 2018-10-03 PROCEDURE — 77080 DXA BONE DENSITY AXIAL: CPT | Mod: TC

## 2018-10-03 PROCEDURE — 76770 US EXAM ABDO BACK WALL COMP: CPT | Mod: TC

## 2018-10-03 NOTE — TELEPHONE ENCOUNTER
Spoke with pt she stated she will schedule b/p appt and lab appt when she gets here because she's currently driving here for her scheduled appts for today

## 2018-10-09 ENCOUNTER — LAB VISIT (OUTPATIENT)
Dept: LAB | Facility: HOSPITAL | Age: 68
End: 2018-10-09
Attending: INTERNAL MEDICINE
Payer: COMMERCIAL

## 2018-10-09 DIAGNOSIS — D64.9 ANEMIA, UNSPECIFIED TYPE: ICD-10-CM

## 2018-10-09 LAB
BASOPHILS # BLD AUTO: 0.07 K/UL
BASOPHILS NFR BLD: 0.9 %
DIFFERENTIAL METHOD: ABNORMAL
EOSINOPHIL # BLD AUTO: 0.2 K/UL
EOSINOPHIL NFR BLD: 2.8 %
ERYTHROCYTE [DISTWIDTH] IN BLOOD BY AUTOMATED COUNT: 13 %
FERRITIN SERPL-MCNC: 24 NG/ML
HCT VFR BLD AUTO: 37 %
HGB BLD-MCNC: 11.9 G/DL
IMM GRANULOCYTES # BLD AUTO: 0.01 K/UL
IMM GRANULOCYTES NFR BLD AUTO: 0.1 %
IRON SERPL-MCNC: 90 UG/DL
LYMPHOCYTES # BLD AUTO: 1.7 K/UL
LYMPHOCYTES NFR BLD: 21.6 %
MCH RBC QN AUTO: 30.7 PG
MCHC RBC AUTO-ENTMCNC: 32.2 G/DL
MCV RBC AUTO: 96 FL
MONOCYTES # BLD AUTO: 0.5 K/UL
MONOCYTES NFR BLD: 6.6 %
NEUTROPHILS # BLD AUTO: 5.3 K/UL
NEUTROPHILS NFR BLD: 68 %
NRBC BLD-RTO: 0 /100 WBC
PLATELET # BLD AUTO: 315 K/UL
PMV BLD AUTO: 10.4 FL
RBC # BLD AUTO: 3.87 M/UL
SATURATED IRON: 21 %
TOTAL IRON BINDING CAPACITY: 420 UG/DL
TRANSFERRIN SERPL-MCNC: 284 MG/DL
WBC # BLD AUTO: 7.74 K/UL

## 2018-10-09 PROCEDURE — 82728 ASSAY OF FERRITIN: CPT

## 2018-10-09 PROCEDURE — 36415 COLL VENOUS BLD VENIPUNCTURE: CPT | Mod: PO

## 2018-10-09 PROCEDURE — 85025 COMPLETE CBC W/AUTO DIFF WBC: CPT

## 2018-10-09 PROCEDURE — 83540 ASSAY OF IRON: CPT

## 2018-10-09 RX ORDER — ESTRADIOL 2 MG/1
TABLET ORAL
Qty: 30 TABLET | Refills: 0 | Status: SHIPPED | OUTPATIENT
Start: 2018-10-09 | End: 2018-11-07 | Stop reason: SDUPTHER

## 2018-10-17 ENCOUNTER — IMMUNIZATION (OUTPATIENT)
Dept: INTERNAL MEDICINE | Facility: CLINIC | Age: 68
End: 2018-10-17
Payer: COMMERCIAL

## 2018-10-17 ENCOUNTER — OFFICE VISIT (OUTPATIENT)
Dept: INTERNAL MEDICINE | Facility: CLINIC | Age: 68
End: 2018-10-17
Payer: COMMERCIAL

## 2018-10-17 VITALS
OXYGEN SATURATION: 98 % | SYSTOLIC BLOOD PRESSURE: 132 MMHG | BODY MASS INDEX: 26.02 KG/M2 | HEART RATE: 62 BPM | DIASTOLIC BLOOD PRESSURE: 86 MMHG | WEIGHT: 146.88 LBS | HEIGHT: 63 IN

## 2018-10-17 DIAGNOSIS — I10 ESSENTIAL HYPERTENSION: Primary | ICD-10-CM

## 2018-10-17 DIAGNOSIS — D64.9 ANEMIA, UNSPECIFIED TYPE: ICD-10-CM

## 2018-10-17 PROCEDURE — 99999 PR PBB SHADOW E&M-EST. PATIENT-LVL IV: CPT | Mod: PBBFAC,,, | Performed by: INTERNAL MEDICINE

## 2018-10-17 PROCEDURE — 3075F SYST BP GE 130 - 139MM HG: CPT | Mod: CPTII,S$GLB,, | Performed by: INTERNAL MEDICINE

## 2018-10-17 PROCEDURE — 90471 IMMUNIZATION ADMIN: CPT | Mod: S$GLB,,, | Performed by: INTERNAL MEDICINE

## 2018-10-17 PROCEDURE — 1101F PT FALLS ASSESS-DOCD LE1/YR: CPT | Mod: CPTII,S$GLB,, | Performed by: INTERNAL MEDICINE

## 2018-10-17 PROCEDURE — 3079F DIAST BP 80-89 MM HG: CPT | Mod: CPTII,S$GLB,, | Performed by: INTERNAL MEDICINE

## 2018-10-17 PROCEDURE — 99213 OFFICE O/P EST LOW 20 MIN: CPT | Mod: 25,S$GLB,, | Performed by: INTERNAL MEDICINE

## 2018-10-17 PROCEDURE — 90662 IIV NO PRSV INCREASED AG IM: CPT | Mod: S$GLB,,, | Performed by: INTERNAL MEDICINE

## 2018-10-17 RX ORDER — ATORVASTATIN CALCIUM 40 MG/1
40 TABLET, FILM COATED ORAL DAILY
Qty: 90 TABLET | Refills: 3 | Status: SHIPPED | OUTPATIENT
Start: 2018-10-17 | End: 2019-10-23 | Stop reason: SDUPTHER

## 2018-10-17 RX ORDER — AMLODIPINE BESYLATE 5 MG/1
5 TABLET ORAL DAILY
Qty: 90 TABLET | Refills: 3 | Status: SHIPPED | OUTPATIENT
Start: 2018-10-17 | End: 2018-10-17

## 2018-10-17 NOTE — PROGRESS NOTES
Subjective:       Patient ID: Mercedes Aviles is a 68 y.o. female.    Chief Complaint: Follow-up                    BP, anemia  HPI   We added amlodipine and bP well controlled today.  Headaches have resolved.    She decided against sleep study.  No observed apnea.    Normal dexa, renal ultrasound.  Iron studies also normal.   hgb 11.9.        Review of Systems   Constitutional: Negative for fever and unexpected weight change.   HENT: Negative for congestion and postnasal drip.    Eyes: Negative for pain, discharge and visual disturbance.   Respiratory: Negative for cough, chest tightness, shortness of breath and wheezing.    Cardiovascular: Negative for chest pain and leg swelling.   Gastrointestinal: Negative for abdominal pain, constipation, diarrhea and nausea.   Genitourinary: Negative for difficulty urinating, dysuria and hematuria.   Skin: Negative for rash.   Neurological: Negative for headaches.   Psychiatric/Behavioral: Negative for dysphoric mood and sleep disturbance. The patient is not nervous/anxious.        Objective:      Physical Exam   Constitutional: She is oriented to person, place, and time. She appears well-developed and well-nourished. No distress.   Neurological: She is alert and oriented to person, place, and time.   Psychiatric: She has a normal mood and affect. Her behavior is normal.         Results for orders placed or performed in visit on 10/09/18   CBC auto differential   Result Value Ref Range    WBC 7.74 3.90 - 12.70 K/uL    RBC 3.87 (L) 4.00 - 5.40 M/uL    Hemoglobin 11.9 (L) 12.0 - 16.0 g/dL    Hematocrit 37.0 37.0 - 48.5 %    MCV 96 82 - 98 fL    MCH 30.7 27.0 - 31.0 pg    MCHC 32.2 32.0 - 36.0 g/dL    RDW 13.0 11.5 - 14.5 %    Platelets 315 150 - 350 K/uL    MPV 10.4 9.2 - 12.9 fL    Immature Granulocytes 0.1 0.0 - 0.5 %    Gran # (ANC) 5.3 1.8 - 7.7 K/uL    Immature Grans (Abs) 0.01 0.00 - 0.04 K/uL    Lymph # 1.7 1.0 - 4.8 K/uL    Mono # 0.5 0.3 - 1.0 K/uL    Eos # 0.2 0.0 -  0.5 K/uL    Baso # 0.07 0.00 - 0.20 K/uL    nRBC 0 0 /100 WBC    Gran% 68.0 38.0 - 73.0 %    Lymph% 21.6 18.0 - 48.0 %    Mono% 6.6 4.0 - 15.0 %    Eosinophil% 2.8 0.0 - 8.0 %    Basophil% 0.9 0.0 - 1.9 %    Differential Method Automated    Ferritin   Result Value Ref Range    Ferritin 24 20.0 - 300.0 ng/mL   Iron and TIBC   Result Value Ref Range    Iron 90 30 - 160 ug/dL    Transferrin 284 200 - 375 mg/dL    TIBC 420 250 - 450 ug/dL    Saturated Iron 21 20 - 50 %     Assessment:       1. Essential hypertension    2. Anemia, unspecified type                        - minimal.  Iron normal  3.      ?ckd- u/s urine studies normal  Plan:       Mercedes was seen today for follow-up.    Diagnoses and all orders for this visit:    Essential hypertension    Anemia, unspecified type  -     CBC auto differential; Future    Other orders  -     Discontinue: amLODIPine (NORVASC) 5 MG tablet; Take 1 tablet (5 mg total) by mouth once daily.  -     atorvastatin (LIPITOR) 40 MG tablet; Take 1 tablet (40 mg total) by mouth once daily.

## 2018-11-06 ENCOUNTER — TELEPHONE (OUTPATIENT)
Dept: SLEEP MEDICINE | Facility: HOSPITAL | Age: 68
End: 2018-11-06

## 2018-11-07 RX ORDER — ESTRADIOL 2 MG/1
TABLET ORAL
Qty: 30 TABLET | Refills: 0 | Status: SHIPPED | OUTPATIENT
Start: 2018-11-07 | End: 2018-12-06 | Stop reason: SDUPTHER

## 2018-12-06 RX ORDER — ESTRADIOL 2 MG/1
TABLET ORAL
Qty: 30 TABLET | Refills: 0 | Status: SHIPPED | OUTPATIENT
Start: 2018-12-06 | End: 2019-01-02 | Stop reason: SDUPTHER

## 2019-01-03 RX ORDER — ESTRADIOL 2 MG/1
TABLET ORAL
Qty: 30 TABLET | Refills: 0 | Status: SHIPPED | OUTPATIENT
Start: 2019-01-03 | End: 2019-02-01 | Stop reason: SDUPTHER

## 2019-01-14 ENCOUNTER — TELEPHONE (OUTPATIENT)
Dept: SLEEP MEDICINE | Facility: HOSPITAL | Age: 69
End: 2019-01-14

## 2019-01-14 ENCOUNTER — TELEPHONE (OUTPATIENT)
Dept: OBSTETRICS AND GYNECOLOGY | Facility: CLINIC | Age: 69
End: 2019-01-14

## 2019-01-14 NOTE — TELEPHONE ENCOUNTER
----- Message from Louise Umanzor sent at 1/14/2019 12:46 PM CST -----  Contact: Kristina cardona       Can the clinic reply in [facility]: no    Please refill the medication(s) listed below. Please call the patient when the prescription(s) is ready for  at this phone number   [phone] (home)         Medication #1 Testosterone 5mg  E4M #30        Preferred Pharmacy:   [facility] [place]  [address] [facility]  [address] [place] [address]  Phone: [phone] Fax: [phone]

## 2019-01-16 DIAGNOSIS — Z78.0 MENOPAUSE: Primary | ICD-10-CM

## 2019-01-16 DIAGNOSIS — R10.2 PELVIC PAIN IN FEMALE: Primary | ICD-10-CM

## 2019-02-05 RX ORDER — ESTRADIOL 2 MG/1
TABLET ORAL
Qty: 30 TABLET | Refills: 0 | Status: SHIPPED | OUTPATIENT
Start: 2019-02-05 | End: 2019-03-11 | Stop reason: SDUPTHER

## 2019-02-21 ENCOUNTER — OFFICE VISIT (OUTPATIENT)
Dept: INTERNAL MEDICINE | Facility: CLINIC | Age: 69
End: 2019-02-21
Payer: COMMERCIAL

## 2019-02-21 VITALS
DIASTOLIC BLOOD PRESSURE: 80 MMHG | WEIGHT: 145.94 LBS | OXYGEN SATURATION: 99 % | HEART RATE: 78 BPM | BODY MASS INDEX: 25.86 KG/M2 | HEIGHT: 63 IN | SYSTOLIC BLOOD PRESSURE: 126 MMHG

## 2019-02-21 DIAGNOSIS — F32.A DEPRESSION, UNSPECIFIED DEPRESSION TYPE: ICD-10-CM

## 2019-02-21 DIAGNOSIS — N39.3 STRESS INCONTINENCE OF URINE: ICD-10-CM

## 2019-02-21 DIAGNOSIS — I10 ESSENTIAL HYPERTENSION: ICD-10-CM

## 2019-02-21 DIAGNOSIS — N18.30 CKD (CHRONIC KIDNEY DISEASE) STAGE 3, GFR 30-59 ML/MIN: ICD-10-CM

## 2019-02-21 DIAGNOSIS — K59.09 CONSTIPATION, CHRONIC: Primary | ICD-10-CM

## 2019-02-21 DIAGNOSIS — R15.9 FECAL SOILING DUE TO FECAL INCONTINENCE: ICD-10-CM

## 2019-02-21 DIAGNOSIS — F43.9 SITUATIONAL STRESS: ICD-10-CM

## 2019-02-21 PROCEDURE — 99999 PR PBB SHADOW E&M-EST. PATIENT-LVL V: ICD-10-PCS | Mod: PBBFAC,,, | Performed by: INTERNAL MEDICINE

## 2019-02-21 PROCEDURE — 1101F PR PT FALLS ASSESS DOC 0-1 FALLS W/OUT INJ PAST YR: ICD-10-PCS | Mod: CPTII,S$GLB,, | Performed by: INTERNAL MEDICINE

## 2019-02-21 PROCEDURE — 99214 PR OFFICE/OUTPT VISIT, EST, LEVL IV, 30-39 MIN: ICD-10-PCS | Mod: S$GLB,,, | Performed by: INTERNAL MEDICINE

## 2019-02-21 PROCEDURE — 3074F PR MOST RECENT SYSTOLIC BLOOD PRESSURE < 130 MM HG: ICD-10-PCS | Mod: CPTII,S$GLB,, | Performed by: INTERNAL MEDICINE

## 2019-02-21 PROCEDURE — 3079F DIAST BP 80-89 MM HG: CPT | Mod: CPTII,S$GLB,, | Performed by: INTERNAL MEDICINE

## 2019-02-21 PROCEDURE — 3074F SYST BP LT 130 MM HG: CPT | Mod: CPTII,S$GLB,, | Performed by: INTERNAL MEDICINE

## 2019-02-21 PROCEDURE — 99999 PR PBB SHADOW E&M-EST. PATIENT-LVL V: CPT | Mod: PBBFAC,,, | Performed by: INTERNAL MEDICINE

## 2019-02-21 PROCEDURE — 3079F PR MOST RECENT DIASTOLIC BLOOD PRESSURE 80-89 MM HG: ICD-10-PCS | Mod: CPTII,S$GLB,, | Performed by: INTERNAL MEDICINE

## 2019-02-21 PROCEDURE — 99214 OFFICE O/P EST MOD 30 MIN: CPT | Mod: S$GLB,,, | Performed by: INTERNAL MEDICINE

## 2019-02-21 PROCEDURE — 1101F PT FALLS ASSESS-DOCD LE1/YR: CPT | Mod: CPTII,S$GLB,, | Performed by: INTERNAL MEDICINE

## 2019-02-21 RX ORDER — OMEPRAZOLE 20 MG/1
20 CAPSULE, DELAYED RELEASE ORAL DAILY
COMMUNITY
End: 2020-06-08 | Stop reason: CLARIF

## 2019-02-21 RX ORDER — AMLODIPINE BESYLATE 5 MG/1
TABLET ORAL
Refills: 2 | COMMUNITY
Start: 2019-02-10 | End: 2020-01-22 | Stop reason: SDUPTHER

## 2019-02-21 RX ORDER — ESCITALOPRAM OXALATE 5 MG/1
5 TABLET ORAL DAILY
Qty: 30 TABLET | Refills: 5 | Status: SHIPPED | OUTPATIENT
Start: 2019-02-21 | End: 2019-08-06

## 2019-02-21 NOTE — PATIENT INSTRUCTIONS
Fiber tab twice a day with big glass water.    If not effective,  Try miralax.    Fruits, veggies, prunes, multigrain breads.

## 2019-02-21 NOTE — PROGRESS NOTES
Subjective:       Patient ID: Mercedes Aviles is a 68 y.o. female.    Chief Complaint: Urinary Frequency; Diarrhea; and Constipation    HPI   Declined Beth Marivel Touch for urinary incontinence.  Chronic urinary frequency.  No burning.  Ur incontinence with coughing.    C/o chronic constipation, life long.  Tried fiber supplement.  Unable to completely evacuate though, so stains panties.  Colonoscopy in 2018 reviewed.  Had ozzing before trying fiber.        Stressed caring for demented mother who ifs verbally abusive.  She has limited time with her but feels antidepressant would be helpful      She has htn, controlled tocay, complicated by ckd 3  Review of Systems   Constitutional: Negative for fever and unexpected weight change.   HENT: Negative for congestion and postnasal drip.    Respiratory: Negative for chest tightness, shortness of breath and wheezing.    Cardiovascular: Negative for chest pain and leg swelling.   Gastrointestinal: Positive for constipation. Negative for abdominal pain, anal bleeding, diarrhea, nausea and vomiting.   Genitourinary: Negative for dysuria and urgency.   Skin: Negative for rash.   Neurological: Negative for headaches.   Psychiatric/Behavioral: Negative for dysphoric mood and sleep disturbance. The patient is not nervous/anxious.        Objective:      Physical Exam   Constitutional: She is oriented to person, place, and time. She appears well-developed and well-nourished.   Neurological: She is alert and oriented to person, place, and time.   Psychiatric: She has a normal mood and affect. Her behavior is normal.       Assessment:       1. Constipation, chronic    2. Fecal soiling due to fecal incontinence    3. Depression, unspecified depression type    4. CKD (chronic kidney disease) stage 3, GFR 30-59 ml/min    5. Essential hypertension    6. Stress incontinence of urine    7. Situational stress        Plan:       Mercedes was seen today for urinary frequency, diarrhea and  constipation.    Diagnoses and all orders for this visit:    Constipation, chronic    Fecal soiling due to fecal incontinence    Depression, unspecified depression type    CKD (chronic kidney disease) stage 3, GFR 30-59 ml/min    Essential hypertension    Stress incontinence of urine    Situational stress    Other orders  -     escitalopram oxalate (LEXAPRO) 5 MG Tab; Take 1 tablet (5 mg total) by mouth once daily.       see pt instructions

## 2019-03-12 RX ORDER — ESTRADIOL 2 MG/1
TABLET ORAL
Qty: 30 TABLET | Refills: 0 | Status: SHIPPED | OUTPATIENT
Start: 2019-03-12 | End: 2019-04-10 | Stop reason: SDUPTHER

## 2019-03-21 ENCOUNTER — OFFICE VISIT (OUTPATIENT)
Dept: INTERNAL MEDICINE | Facility: CLINIC | Age: 69
End: 2019-03-21
Payer: COMMERCIAL

## 2019-03-21 VITALS
HEIGHT: 63 IN | WEIGHT: 147.81 LBS | HEART RATE: 61 BPM | BODY MASS INDEX: 26.19 KG/M2 | DIASTOLIC BLOOD PRESSURE: 84 MMHG | SYSTOLIC BLOOD PRESSURE: 122 MMHG | OXYGEN SATURATION: 99 %

## 2019-03-21 DIAGNOSIS — H81.09 MENIERE'S DISEASE, UNSPECIFIED LATERALITY: ICD-10-CM

## 2019-03-21 DIAGNOSIS — F43.23 ADJUSTMENT DISORDER WITH MIXED ANXIETY AND DEPRESSED MOOD: Primary | ICD-10-CM

## 2019-03-21 DIAGNOSIS — F43.9 SITUATIONAL STRESS: ICD-10-CM

## 2019-03-21 DIAGNOSIS — R53.83 FATIGUE, UNSPECIFIED TYPE: ICD-10-CM

## 2019-03-21 DIAGNOSIS — K59.09 CONSTIPATION, CHRONIC: ICD-10-CM

## 2019-03-21 PROCEDURE — 99213 PR OFFICE/OUTPT VISIT, EST, LEVL III, 20-29 MIN: ICD-10-PCS | Mod: S$GLB,,, | Performed by: INTERNAL MEDICINE

## 2019-03-21 PROCEDURE — 99213 OFFICE O/P EST LOW 20 MIN: CPT | Mod: S$GLB,,, | Performed by: INTERNAL MEDICINE

## 2019-03-21 PROCEDURE — 1101F PT FALLS ASSESS-DOCD LE1/YR: CPT | Mod: CPTII,S$GLB,, | Performed by: INTERNAL MEDICINE

## 2019-03-21 PROCEDURE — 3074F SYST BP LT 130 MM HG: CPT | Mod: CPTII,S$GLB,, | Performed by: INTERNAL MEDICINE

## 2019-03-21 PROCEDURE — 99999 PR PBB SHADOW E&M-EST. PATIENT-LVL III: ICD-10-PCS | Mod: PBBFAC,,, | Performed by: INTERNAL MEDICINE

## 2019-03-21 PROCEDURE — 99999 PR PBB SHADOW E&M-EST. PATIENT-LVL III: CPT | Mod: PBBFAC,,, | Performed by: INTERNAL MEDICINE

## 2019-03-21 PROCEDURE — 1101F PR PT FALLS ASSESS DOC 0-1 FALLS W/OUT INJ PAST YR: ICD-10-PCS | Mod: CPTII,S$GLB,, | Performed by: INTERNAL MEDICINE

## 2019-03-21 PROCEDURE — 3074F PR MOST RECENT SYSTOLIC BLOOD PRESSURE < 130 MM HG: ICD-10-PCS | Mod: CPTII,S$GLB,, | Performed by: INTERNAL MEDICINE

## 2019-03-21 PROCEDURE — 3079F PR MOST RECENT DIASTOLIC BLOOD PRESSURE 80-89 MM HG: ICD-10-PCS | Mod: CPTII,S$GLB,, | Performed by: INTERNAL MEDICINE

## 2019-03-21 PROCEDURE — 3079F DIAST BP 80-89 MM HG: CPT | Mod: CPTII,S$GLB,, | Performed by: INTERNAL MEDICINE

## 2019-03-21 RX ORDER — BETAHISTINE HCL 100 %
POWDER (GRAM) MISCELLANEOUS
Qty: 12 G | Refills: 0
Start: 2019-03-21 | End: 2020-05-29

## 2019-03-21 NOTE — PROGRESS NOTES
Subjective:       Patient ID: Mercedes Aviles is a 68 y.o. female.    Chief Complaint: Follow-up    HPI   She stopped lexapro, even though it helped obsessive thoughts, anxiety because it caused excessive fatigue.    She extended her typical afternoon nap by another hour or so.   She was afraid to take it at night, as taking klonopin for Meniere's Disease.   She sleeps well at night.    Constipation also improved with fiber.      Review of Systems   Constitutional: Positive for fatigue.       Objective:      Physical Exam   Constitutional: She is oriented to person, place, and time. She appears well-developed and well-nourished. No distress.   Neurological: She is alert and oriented to person, place, and time.   Psychiatric: She has a normal mood and affect. Her behavior is normal. Judgment and thought content normal.       Assessment:       1. Adjustment disorder with mixed anxiety and depressed mood    2. Situational stress    3. Fatigue, unspecified type    4. Meniere's disease, unspecified laterality    5. Constipation, chronic        Plan:       Mercedes was seen today for follow-up.    Diagnoses and all orders for this visit:    Adjustment disorder with mixed anxiety and depressed mood    Situational stress    Fatigue, unspecified type    Meniere's disease, unspecified laterality    Constipation, chronic    Other orders  -     betahistine HCl (BETAHISTINE, BULK,) 100 % Powd; She is taking 12 mg caps - 1  bid                    For Meniere's - prescribed by her ENT       Try lexapro at bedtime.

## 2019-04-01 RX ORDER — LEVOTHYROXINE SODIUM 50 UG/1
TABLET ORAL
Qty: 30 TABLET | Refills: 11 | Status: SHIPPED | OUTPATIENT
Start: 2019-04-01 | End: 2020-02-26

## 2019-04-01 NOTE — TELEPHONE ENCOUNTER
----- Message from Linda Tucker sent at 4/1/2019 10:44 AM CDT -----  Contact: self [phone]  Type: Rx    Name of medication(s): levothyroxine (SYNTHROID) 50 MCG tablet    Is this a refill? New rx? refill    Who prescribed medication? Bethany    Pharmacy Name, Phone, & Location: [facility] -    Comments: Patient is requesting above refill

## 2019-04-15 RX ORDER — ESTRADIOL 2 MG/1
TABLET ORAL
Qty: 30 TABLET | Refills: 0 | Status: SHIPPED | OUTPATIENT
Start: 2019-04-15 | End: 2019-05-13 | Stop reason: SDUPTHER

## 2019-05-13 RX ORDER — ESTRADIOL 2 MG/1
TABLET ORAL
Qty: 30 TABLET | Refills: 0 | Status: SHIPPED | OUTPATIENT
Start: 2019-05-13 | End: 2019-06-10 | Stop reason: SDUPTHER

## 2019-06-10 DIAGNOSIS — Z00.00 ROUTINE GENERAL MEDICAL EXAMINATION AT A HEALTH CARE FACILITY: Primary | ICD-10-CM

## 2019-06-11 RX ORDER — ESTRADIOL 2 MG/1
TABLET ORAL
Qty: 30 TABLET | Refills: 0 | Status: SHIPPED | OUTPATIENT
Start: 2019-06-11 | End: 2019-07-14 | Stop reason: SDUPTHER

## 2019-07-16 RX ORDER — ESTRADIOL 2 MG/1
TABLET ORAL
Qty: 30 TABLET | Refills: 0 | Status: SHIPPED | OUTPATIENT
Start: 2019-07-16 | End: 2019-08-12 | Stop reason: SDUPTHER

## 2019-07-19 ENCOUNTER — TELEPHONE (OUTPATIENT)
Dept: OBSTETRICS AND GYNECOLOGY | Facility: CLINIC | Age: 69
End: 2019-07-19

## 2019-07-19 NOTE — TELEPHONE ENCOUNTER
----- Message from Ayaz Almonte sent at 7/19/2019 10:17 AM CDT -----  PLEASE CALL DEEJAY DENG TO [facility] PT [phone]

## 2019-08-06 ENCOUNTER — CLINICAL SUPPORT (OUTPATIENT)
Dept: INTERNAL MEDICINE | Facility: CLINIC | Age: 69
End: 2019-08-06
Payer: COMMERCIAL

## 2019-08-06 ENCOUNTER — HOSPITAL ENCOUNTER (OUTPATIENT)
Dept: RADIOLOGY | Facility: HOSPITAL | Age: 69
Discharge: HOME | End: 2019-08-06
Attending: INTERNAL MEDICINE
Payer: COMMERCIAL

## 2019-08-06 ENCOUNTER — HOSPITAL ENCOUNTER (OUTPATIENT)
Dept: CARDIOLOGY | Facility: CLINIC | Age: 69
Discharge: HOME | End: 2019-08-06
Payer: COMMERCIAL

## 2019-08-06 ENCOUNTER — OFFICE VISIT (OUTPATIENT)
Dept: INTERNAL MEDICINE | Facility: CLINIC | Age: 69
End: 2019-08-06
Payer: COMMERCIAL

## 2019-08-06 VITALS
SYSTOLIC BLOOD PRESSURE: 128 MMHG | HEART RATE: 71 BPM | WEIGHT: 147.94 LBS | BODY MASS INDEX: 27.22 KG/M2 | HEIGHT: 62 IN | DIASTOLIC BLOOD PRESSURE: 74 MMHG | OXYGEN SATURATION: 98 %

## 2019-08-06 DIAGNOSIS — E03.9 HYPOTHYROIDISM, UNSPECIFIED TYPE: ICD-10-CM

## 2019-08-06 DIAGNOSIS — Z00.00 ROUTINE GENERAL MEDICAL EXAMINATION AT A HEALTH CARE FACILITY: ICD-10-CM

## 2019-08-06 DIAGNOSIS — I10 ESSENTIAL HYPERTENSION: ICD-10-CM

## 2019-08-06 DIAGNOSIS — H81.09 MENIERE'S DISEASE, UNSPECIFIED LATERALITY: ICD-10-CM

## 2019-08-06 DIAGNOSIS — R21 RASH: ICD-10-CM

## 2019-08-06 DIAGNOSIS — Z00.00 ROUTINE GENERAL MEDICAL EXAMINATION AT A HEALTH CARE FACILITY: Primary | ICD-10-CM

## 2019-08-06 DIAGNOSIS — Z00.00 ANNUAL PHYSICAL EXAM: Primary | ICD-10-CM

## 2019-08-06 PROBLEM — Z12.11 COLON CANCER SCREENING: Status: RESOLVED | Noted: 2018-08-10 | Resolved: 2019-08-06

## 2019-08-06 LAB
25(OH)D3+25(OH)D2 SERPL-MCNC: 50 NG/ML (ref 30–96)
ALBUMIN SERPL BCP-MCNC: 3.8 G/DL (ref 3.5–5.2)
ALP SERPL-CCNC: 76 U/L (ref 55–135)
ALT SERPL W/O P-5'-P-CCNC: 13 U/L (ref 10–44)
ANION GAP SERPL CALC-SCNC: 7 MMOL/L (ref 8–16)
AST SERPL-CCNC: 19 U/L (ref 10–40)
BILIRUB SERPL-MCNC: 0.6 MG/DL (ref 0.1–1)
BUN SERPL-MCNC: 19 MG/DL (ref 8–23)
CALCIUM SERPL-MCNC: 9.6 MG/DL (ref 8.7–10.5)
CHLORIDE SERPL-SCNC: 103 MMOL/L (ref 95–110)
CHOLEST SERPL-MCNC: 200 MG/DL (ref 120–199)
CHOLEST/HDLC SERPL: 2.9 {RATIO} (ref 2–5)
CO2 SERPL-SCNC: 25 MMOL/L (ref 23–29)
CREAT SERPL-MCNC: 1.1 MG/DL (ref 0.5–1.4)
ERYTHROCYTE [DISTWIDTH] IN BLOOD BY AUTOMATED COUNT: 13.5 % (ref 11.5–14.5)
EST. GFR  (AFRICAN AMERICAN): >60 ML/MIN/1.73 M^2
EST. GFR  (NON AFRICAN AMERICAN): 52.1 ML/MIN/1.73 M^2
ESTIMATED AVG GLUCOSE: 108 MG/DL (ref 68–131)
GLUCOSE SERPL-MCNC: 92 MG/DL (ref 70–110)
HBA1C MFR BLD HPLC: 5.4 % (ref 4–5.6)
HCT VFR BLD AUTO: 39.5 % (ref 37–48.5)
HDLC SERPL-MCNC: 69 MG/DL (ref 40–75)
HDLC SERPL: 34.5 % (ref 20–50)
HGB BLD-MCNC: 12.9 G/DL (ref 12–16)
LDLC SERPL CALC-MCNC: 101 MG/DL (ref 63–159)
MCH RBC QN AUTO: 30.4 PG (ref 27–31)
MCHC RBC AUTO-ENTMCNC: 32.7 G/DL (ref 32–36)
MCV RBC AUTO: 93 FL (ref 82–98)
NONHDLC SERPL-MCNC: 131 MG/DL
PLATELET # BLD AUTO: 313 K/UL (ref 150–350)
PMV BLD AUTO: 10.2 FL (ref 9.2–12.9)
POTASSIUM SERPL-SCNC: 4.1 MMOL/L (ref 3.5–5.1)
PROT SERPL-MCNC: 7.1 G/DL (ref 6–8.4)
RBC # BLD AUTO: 4.25 M/UL (ref 4–5.4)
SODIUM SERPL-SCNC: 135 MMOL/L (ref 136–145)
TRIGL SERPL-MCNC: 150 MG/DL (ref 30–150)
TSH SERPL DL<=0.005 MIU/L-ACNC: 1.59 UIU/ML (ref 0.4–4)
WBC # BLD AUTO: 7.04 K/UL (ref 3.9–12.7)

## 2019-08-06 PROCEDURE — 3078F DIAST BP <80 MM HG: CPT | Mod: CPTII,S$GLB,, | Performed by: INTERNAL MEDICINE

## 2019-08-06 PROCEDURE — 77067 SCR MAMMO BI INCL CAD: CPT | Mod: 26,,, | Performed by: RADIOLOGY

## 2019-08-06 PROCEDURE — 83036 HEMOGLOBIN GLYCOSYLATED A1C: CPT

## 2019-08-06 PROCEDURE — 85027 COMPLETE CBC AUTOMATED: CPT

## 2019-08-06 PROCEDURE — 77063 MAMMO DIGITAL SCREENING BILAT WITH TOMOSYNTHESIS_CAD: ICD-10-PCS | Mod: 26,,, | Performed by: RADIOLOGY

## 2019-08-06 PROCEDURE — 80061 LIPID PANEL: CPT

## 2019-08-06 PROCEDURE — 3074F PR MOST RECENT SYSTOLIC BLOOD PRESSURE < 130 MM HG: ICD-10-PCS | Mod: CPTII,S$GLB,, | Performed by: INTERNAL MEDICINE

## 2019-08-06 PROCEDURE — 3078F PR MOST RECENT DIASTOLIC BLOOD PRESSURE < 80 MM HG: ICD-10-PCS | Mod: CPTII,S$GLB,, | Performed by: INTERNAL MEDICINE

## 2019-08-06 PROCEDURE — 99999 PR PBB SHADOW E&M-EST. PATIENT-LVL III: CPT | Mod: PBBFAC,,, | Performed by: INTERNAL MEDICINE

## 2019-08-06 PROCEDURE — 77067 MAMMO DIGITAL SCREENING BILAT WITH TOMOSYNTHESIS_CAD: ICD-10-PCS | Mod: 26,,, | Performed by: RADIOLOGY

## 2019-08-06 PROCEDURE — 93010 ELECTROCARDIOGRAM REPORT: CPT | Mod: S$PBB,,, | Performed by: INTERNAL MEDICINE

## 2019-08-06 PROCEDURE — 99387 PR PREVENTIVE VISIT,NEW,65 & OVER: ICD-10-PCS | Mod: S$GLB,,, | Performed by: INTERNAL MEDICINE

## 2019-08-06 PROCEDURE — 77067 SCR MAMMO BI INCL CAD: CPT | Mod: TC

## 2019-08-06 PROCEDURE — 84443 ASSAY THYROID STIM HORMONE: CPT

## 2019-08-06 PROCEDURE — 93010 EKG 12-LEAD: ICD-10-PCS | Mod: S$PBB,,, | Performed by: INTERNAL MEDICINE

## 2019-08-06 PROCEDURE — 80053 COMPREHEN METABOLIC PANEL: CPT

## 2019-08-06 PROCEDURE — 93005 ELECTROCARDIOGRAM TRACING: CPT | Mod: PBBFAC | Performed by: INTERNAL MEDICINE

## 2019-08-06 PROCEDURE — 82306 VITAMIN D 25 HYDROXY: CPT

## 2019-08-06 PROCEDURE — 3074F SYST BP LT 130 MM HG: CPT | Mod: CPTII,S$GLB,, | Performed by: INTERNAL MEDICINE

## 2019-08-06 PROCEDURE — 99999 PR PBB SHADOW E&M-EST. PATIENT-LVL III: ICD-10-PCS | Mod: PBBFAC,,, | Performed by: INTERNAL MEDICINE

## 2019-08-06 PROCEDURE — 77063 BREAST TOMOSYNTHESIS BI: CPT | Mod: 26,,, | Performed by: RADIOLOGY

## 2019-08-06 PROCEDURE — 99387 INIT PM E/M NEW PAT 65+ YRS: CPT | Mod: S$GLB,,, | Performed by: INTERNAL MEDICINE

## 2019-08-06 RX ORDER — TRIAMCINOLONE ACETONIDE 1 MG/G
OINTMENT TOPICAL 2 TIMES DAILY
Qty: 15 G | Refills: 1 | Status: SHIPPED | OUTPATIENT
Start: 2019-08-06 | End: 2020-05-29

## 2019-08-06 NOTE — LETTER
August 12, 2019    Mercedes Aviles  [address] [place] [address]             [facility]  [address]  [facility] [address]  Phone: [phone]  Fax: [phone] Dear Mrs. Aviles:    Thank you for allowing me to serve you and perform your Executive Health exam on 8/6/2019.  This letter will serve a brief summary of the history, physical findings, and laboratory/studies performed and recommendations at that time.    Reason for Visit: Executive Health Preventive Physical Examination    Past Medical History:   Diagnosis Date    Hyperlipidemia     Hypertension     Meniere disease     Psoriasis        Past Surgical History:   Procedure Laterality Date    CARPAL TUNNEL RELEASE      right     COLONOSCOPY N/A 8/10/2018    Performed by Stiven Stoner MD at [facility] ENDO (4TH FLR)    EAR MASTOIDECTOMY W/ COCHLEAR IMPLANT W/ LANDMARK      ESOPHAGOGASTRODUODENOSCOPY (EGD) N/A 6/22/2015    Performed by Kirill Ramos MD at [facility] ENDO (4TH FLR)    FOOT SURGERY      HYSTERECTOMY      in her 40's    tarsal tunnel      right foot     TONSILLECTOMY         Family History   Problem Relation Age of Onset    Psoriasis Maternal Grandmother     Hypertension Mother     Dementia Mother     Hypertension Sister     Hyperlipidemia Sister     Cancer Sister         uterus/cx    No Known Problems Son     Hyperlipidemia Daughter     Arthritis Daughter     Breast cancer Neg Hx     Ovarian cancer Neg Hx        Social History     Socioeconomic History    Marital status:      Spouse name: Not on file    Number of children: 2    Years of education: Not on file    Highest education level: Not on file   Occupational History    Occupation: ,      Comment: self-employed     Employer: [employer]   Social Needs    Financial resource strain: Not on file    Food insecurity:     Worry: Not on file     Inability: Not on file    Transportation needs:     Medical: Not on  file     Non-medical: Not on file   Tobacco Use    Smoking status: Never Smoker    Smokeless tobacco: Never Used   Substance and Sexual Activity    Alcohol use: No    Drug use: No    Sexual activity: Yes     Birth control/protection: None   Lifestyle    Physical activity:     Days per week: Not on file     Minutes per session: Not on file    Stress: Not on file   Relationships    Social connections:     Talks on phone: Not on file     Gets together: Not on file     Attends Sikh service: Not on file     Active member of club or organization: Not on file     Attends meetings of clubs or organizations: Not on file     Relationship status: Not on file   Other Topics Concern    Are you pregnant or think you may be? No    Breast-feeding No   Social History Narrative    Exercise:  .      She has returned to the gym.        Son is engaged.        Review of patient's allergies indicates:   Allergen Reactions    Celebrex [celecoxib]     Propylene glycol Itching     Other reaction(s): Itching    Simvastatin      Other reaction(s): Rash         Current Outpatient Medications:     amLODIPine (NORVASC) 5 MG tablet, , Disp: , Rfl: 2    aspirin 81 mg Tab, , Disp: , Rfl:     atorvastatin (LIPITOR) 40 MG tablet, Take 1 tablet (40 mg total) by mouth once daily., Disp: 90 tablet, Rfl: 3    betahistine HCl (BETAHISTINE, BULK,) 100 % Powd, She is taking 12 mg caps - 1  bid, Disp: 12 g, Rfl: 0    biotin 5 mg Cap, Take by mouth., Disp: , Rfl:     cholecalciferol, vitamin D3, 2,000 unit Cap, Take by mouth., Disp: , Rfl:     clonazePAM (KLONOPIN) 0.5 MG tablet, Take 0.5 mg by mouth once daily., Disp: , Rfl:     COMPOUND HORMONE REPLACEMENT, Take by mouth once daily. Testosterone 5mg  E4M #30  1 daily 5 refills, Disp: 30 capsule, Rfl: 5    cyanocobalamin (VITAMIN B-12) 100 MCG tablet, Take 100 mcg by mouth once daily., Disp: , Rfl:     estradiol (ESTRACE) 2 MG tablet, TAKE 1 TABLET BY MOUTH ONCE DAILY,  Disp: 30 tablet, Rfl: 0    levothyroxine (SYNTHROID) 50 MCG tablet, TAKE 1 TABLET(50 MCG) BY MOUTH EVERY DAY, Disp: 30 tablet, Rfl: 11    magnesium oxide (MAG-OX) 400 mg tablet, Take 400 mg by mouth once daily., Disp: , Rfl:     omeprazole (PRILOSEC) 20 MG capsule, Take 20 mg by mouth once daily., Disp: , Rfl:     ranitidine (ZANTAC) 150 MG capsule, TAKE 1 CAPSULE BY MOUTH TWICE DAILY, Disp: 60 capsule, Rfl: 11    spironolactone (ALDACTONE) 25 MG tablet, Take 25 mg by mouth once daily., Disp: , Rfl:     vitamin E 100 UNIT capsule, Take by mouth., Disp: , Rfl:     triamcinolone acetonide 0.1% (KENALOG) 0.1 % ointment, Apply topically 2 (two) times daily., Disp: 15 g, Rfl: 1     Review of Systems  Review of Systems - Negative except for chronic scaly rash of the hands.  You have had a very stressful year, but are coping well.  Gastroesophageal reflux is controlled with priosec and your Meniere's disease is stable.  Physical Exam:  General: General appearance: alert, well appearing, and in no distress.   Skin: Skin exam - normal coloration and turgor, no rashes, no suspicious skin lesions noted.  HEENT: Ears - bilateral TM's and external ear canals normal. , ENT exam reveals - ENT exam normal, no neck nodes or sinus tenderness.   Lungs: Chest: clear to auscultation, no wheezes, rales or rhonchi, symmetric air entry.   Heart: CVS exam: normal rate, regular rhythm, normal S1, S2, no murmurs, rubs, clicks or gallops.   Extremities: Exam of extremities: peripheral pulses normal, no pedal edema, no clubbing or cyanosis    Labs:  Results for orders placed or performed in visit on 08/06/19   Comprehensive metabolic panel   Result Value Ref Range    Sodium 135 (L) 136 - 145 mmol/L    Potassium 4.1 3.5 - 5.1 mmol/L    Chloride 103 95 - 110 mmol/L    CO2 25 23 - 29 mmol/L    Glucose 92 70 - 110 mg/dL    BUN, Bld 19 8 - 23 mg/dL    Creatinine 1.1 0.5 - 1.4 mg/dL    Calcium 9.6 8.7 - 10.5 mg/dL    Total Protein 7.1 6.0 -  8.4 g/dL    Albumin 3.8 3.5 - 5.2 g/dL    Total Bilirubin 0.6 0.1 - 1.0 mg/dL    Alkaline Phosphatase 76 55 - 135 U/L    AST 19 10 - 40 U/L    ALT 13 10 - 44 U/L    Anion Gap 7 (L) 8 - 16 mmol/L    eGFR if African American >60.0 >60 mL/min/1.73 m^2    eGFR if non  52.1 (A) >60 mL/min/1.73 m^2   CBC Without Differential   Result Value Ref Range    WBC 7.04 3.90 - 12.70 K/uL    RBC 4.25 4.00 - 5.40 M/uL    Hemoglobin 12.9 12.0 - 16.0 g/dL    Hematocrit 39.5 37.0 - 48.5 %    Mean Corpuscular Volume 93 82 - 98 fL    Mean Corpuscular Hemoglobin 30.4 27.0 - 31.0 pg    Mean Corpuscular Hemoglobin Conc 32.7 32.0 - 36.0 g/dL    RDW 13.5 11.5 - 14.5 %    Platelets 313 150 - 350 K/uL    MPV 10.2 9.2 - 12.9 fL   Lipid panel   Result Value Ref Range    Cholesterol 200 (H) 120 - 199 mg/dL    Triglycerides 150 30 - 150 mg/dL    HDL 69 40 - 75 mg/dL    LDL Cholesterol 101.0 63.0 - 159.0 mg/dL    Hdl/Cholesterol Ratio 34.5 20.0 - 50.0 %    Total Cholesterol/HDL Ratio 2.9 2.0 - 5.0    Non-HDL Cholesterol 131 mg/dL   TSH   Result Value Ref Range    TSH 1.594 0.400 - 4.000 uIU/mL   Hemoglobin A1c   Result Value Ref Range    Hemoglobin A1C 5.4 4.0 - 5.6 %    Estimated Avg Glucose 108 68 - 131 mg/dL   Vitamin D   Result Value Ref Range    Vit D, 25-Hydroxy 50 30 - 96 ng/mL        Assessment/Recommendations:  Routine Health Maintenance    At this time, you appear to be in good medical condition. Labs, EKG nad chest x ray were all fine.   Triamcinolone cream was prescribed for your hand rash.   I would recommend a dermatology referral if not helpful.   I would also recommend shingles vaccine (Shingrix) at your convenience.        I look forward to seeing you again next year.  Please contact me should you have any questions or concerns regarding physical findings, or my recommendations.    If you have any questions or concerns, please don't hesitate to call.    Sincerely,    Lashawn Sims MD

## 2019-08-06 NOTE — PROGRESS NOTES
Subjective:       Patient ID: Mercedes Aviles is a 69 y.o. female.    Chief Complaint: Executive Health    HPI   Stressful year:  Mother  in .  [facility]. Denies depression.    More time for herself, so hope to return to the gym.    C/o scaly hands ant mcp knuckles.  otc creams help a little.  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        Takes prilosec daily for reflux.      Meniere's dz - takes klonopin, betahistine.     PT helpful.      Review of Systems   Constitutional: Negative for fever and unexpected weight change.   HENT: Negative for congestion and postnasal drip.    Respiratory: Negative for chest tightness, shortness of breath and wheezing.    Cardiovascular: Negative for chest pain and leg swelling.   Gastrointestinal: Negative for abdominal pain, anal bleeding, constipation, diarrhea, nausea and vomiting.   Genitourinary: Negative for dysuria and urgency.   Skin: Negative for rash.   Neurological: Negative for headaches.   Psychiatric/Behavioral: Negative for dysphoric mood and sleep disturbance. The patient is not nervous/anxious.        Objective:      Physical Exam   Constitutional: She is oriented to person, place, and time. She appears well-developed and well-nourished. No distress.   HENT:   Head: Normocephalic and atraumatic.   Right Ear: External ear normal.   Left Ear: External ear normal.   Nose: Nose normal.   Mouth/Throat: Oropharynx is clear and moist.   Eyes: Pupils are equal, round, and reactive to light. Conjunctivae and EOM are normal. Right eye exhibits no discharge. Left eye exhibits no discharge. No scleral icterus.   Neck: Normal range of motion. Neck supple. No JVD present. No thyromegaly present.   Cardiovascular: Normal rate, regular rhythm and normal heart sounds. Exam reveals no gallop.   No murmur heard.  Pulmonary/Chest: Effort normal and breath sounds normal. No respiratory distress. She has no wheezes. She has no rales.    Abdominal: Soft. Bowel sounds are normal. She exhibits no distension and no mass. There is no tenderness. There is no rebound and no guarding.   Musculoskeletal: Normal range of motion. She exhibits no edema or tenderness.   Lymphadenopathy:     She has no cervical adenopathy.   Neurological: She is alert and oriented to person, place, and time. No cranial nerve deficit. Coordination normal.   Skin: Skin is warm and dry. No rash noted.   Mild scaliness over mcp joints    Psychiatric: She has a normal mood and affect. Her behavior is normal. Judgment and thought content normal.       Results for orders placed or performed in visit on 08/06/19   Comprehensive metabolic panel   Result Value Ref Range    Sodium 135 (L) 136 - 145 mmol/L    Potassium 4.1 3.5 - 5.1 mmol/L    Chloride 103 95 - 110 mmol/L    CO2 25 23 - 29 mmol/L    Glucose 92 70 - 110 mg/dL    BUN, Bld 19 8 - 23 mg/dL    Creatinine 1.1 0.5 - 1.4 mg/dL    Calcium 9.6 8.7 - 10.5 mg/dL    Total Protein 7.1 6.0 - 8.4 g/dL    Albumin 3.8 3.5 - 5.2 g/dL    Total Bilirubin 0.6 0.1 - 1.0 mg/dL    Alkaline Phosphatase 76 55 - 135 U/L    AST 19 10 - 40 U/L    ALT 13 10 - 44 U/L    Anion Gap 7 (L) 8 - 16 mmol/L    eGFR if African American >60.0 >60 mL/min/1.73 m^2    eGFR if non  52.1 (A) >60 mL/min/1.73 m^2   CBC Without Differential   Result Value Ref Range    WBC 7.04 3.90 - 12.70 K/uL    RBC 4.25 4.00 - 5.40 M/uL    Hemoglobin 12.9 12.0 - 16.0 g/dL    Hematocrit 39.5 37.0 - 48.5 %    Mean Corpuscular Volume 93 82 - 98 fL    Mean Corpuscular Hemoglobin 30.4 27.0 - 31.0 pg    Mean Corpuscular Hemoglobin Conc 32.7 32.0 - 36.0 g/dL    RDW 13.5 11.5 - 14.5 %    Platelets 313 150 - 350 K/uL    MPV 10.2 9.2 - 12.9 fL   Lipid panel   Result Value Ref Range    Cholesterol 200 (H) 120 - 199 mg/dL    Triglycerides 150 30 - 150 mg/dL    HDL 69 40 - 75 mg/dL    LDL Cholesterol 101.0 63.0 - 159.0 mg/dL    Hdl/Cholesterol Ratio 34.5 20.0 - 50.0 %    Total  Cholesterol/HDL Ratio 2.9 2.0 - 5.0    Non-HDL Cholesterol 131 mg/dL   TSH   Result Value Ref Range    TSH 1.594 0.400 - 4.000 uIU/mL   Hemoglobin A1c   Result Value Ref Range    Hemoglobin A1C 5.4 4.0 - 5.6 %    Estimated Avg Glucose 108 68 - 131 mg/dL   Vitamin D   Result Value Ref Range    Vit D, 25-Hydroxy 50 30 - 96 ng/mL     Assessment:       1. Annual physical exam    2. Meniere's disease, unspecified laterality    3. Hypothyroidism, unspecified type    4. Essential hypertension    5. Rash        Plan:       Mercedes was seen today for [facility].    Diagnoses and all orders for this visit:    Annual physical exam    Meniere's disease, unspecified laterality    Hypothyroidism, unspecified type    Essential hypertension    Rash    Other orders  -     triamcinolone acetonide 0.1% (KENALOG) 0.1 % ointment; Apply topically 2 (two) times daily.       Derm if no better.  shingrix

## 2019-08-12 RX ORDER — ESCITALOPRAM OXALATE 5 MG/1
TABLET ORAL
Qty: 30 TABLET | Refills: 11 | Status: SHIPPED | OUTPATIENT
Start: 2019-08-12 | End: 2020-07-14

## 2019-08-14 RX ORDER — ESTRADIOL 2 MG/1
TABLET ORAL
Qty: 30 TABLET | Refills: 0 | Status: SHIPPED | OUTPATIENT
Start: 2019-08-14 | End: 2019-09-10 | Stop reason: SDUPTHER

## 2019-09-10 RX ORDER — AMLODIPINE BESYLATE 5 MG/1
TABLET ORAL
Qty: 90 TABLET | Refills: 3 | Status: SHIPPED | OUTPATIENT
Start: 2019-09-10 | End: 2020-08-19

## 2019-09-10 RX ORDER — ESTRADIOL 2 MG/1
TABLET ORAL
Qty: 30 TABLET | Refills: 0 | Status: SHIPPED | OUTPATIENT
Start: 2019-09-10 | End: 2019-10-11 | Stop reason: SDUPTHER

## 2019-10-15 RX ORDER — ESTRADIOL 2 MG/1
TABLET ORAL
Qty: 30 TABLET | Refills: 0 | Status: SHIPPED | OUTPATIENT
Start: 2019-10-15 | End: 2020-06-16

## 2019-10-23 RX ORDER — ATORVASTATIN CALCIUM 40 MG/1
TABLET, FILM COATED ORAL
Qty: 90 TABLET | Refills: 0 | Status: SHIPPED | OUTPATIENT
Start: 2019-10-23 | End: 2020-01-22

## 2020-01-22 RX ORDER — ATORVASTATIN CALCIUM 40 MG/1
TABLET, FILM COATED ORAL
Qty: 90 TABLET | Refills: 1 | Status: SHIPPED | OUTPATIENT
Start: 2020-01-22 | End: 2020-07-20

## 2020-01-22 NOTE — PROGRESS NOTES
Refill Authorization Note     is requesting a refill authorization.    Brief assessment and rationale for refill: APPROVE: prr     Medication-related problems identified: Therapeutic duplication                                   Comments:   Requested Prescriptions   Pending Prescriptions Disp Refills    atorvastatin (LIPITOR) 40 MG tablet [Pharmacy Med Name: ATORVASTATIN 40MG TABLETS] 90 tablet 1     Sig: TAKE 1 TABLET(40 MG) BY MOUTH EVERY DAY       Cardiovascular:  Antilipid - Statins Passed - 1/21/2020  5:35 AM        Passed - Patient is at least 18 years old        Passed - Office visit in past 12 months or future 90 days     Recent Outpatient Visits            5 months ago Annual physical exam    [facility] Lashawn Sims MD    10 months ago Adjustment disorder with mixed anxiety and depressed mood    [facility] Lashawn Sims MD    11 months ago Constipation, chronic    [facility] Lashawn Sims MD    1 year ago Essential hypertension    [facility] Lashawn Sims MD    1 year ago Annual physical exam    [facility] Lashawn Sims MD                    Passed - Lipid Panel completed in last 360 days     Lab Results   Component Value Date    CHOL 200 (H) 08/06/2019    HDL 69 08/06/2019    LDLCALC 101.0 08/06/2019    TRIG 150 08/06/2019             Passed - ALT is 94 or below and within 360 days     ALT   Date Value Ref Range Status   08/06/2019 13 10 - 44 U/L Final   09/18/2018 13 10 - 44 U/L Final   09/14/2017 13 10 - 44 U/L Final              Passed - AST is 54 or below and within 360 days     AST   Date Value Ref Range Status   08/06/2019 19 10 - 40 U/L Final   09/18/2018 19 10 - 40 U/L Final   09/14/2017 18 10 - 40 U/L Final

## 2020-01-29 ENCOUNTER — TELEPHONE (OUTPATIENT)
Dept: INTERNAL MEDICINE | Facility: CLINIC | Age: 70
End: 2020-01-29

## 2020-02-26 RX ORDER — LEVOTHYROXINE SODIUM 50 UG/1
TABLET ORAL
Qty: 90 TABLET | Refills: 1 | Status: SHIPPED | OUTPATIENT
Start: 2020-02-26 | End: 2020-08-19

## 2020-02-26 NOTE — PROGRESS NOTES
Refill Authorization Note     is requesting a refill authorization.    Brief assessment and rationale for refill: APPROVE: prr                Medication reconciliation completed: No                         Comments:   Requested Prescriptions   Pending Prescriptions Disp Refills    levothyroxine (SYNTHROID) 50 MCG tablet [Pharmacy Med Name: LEVOTHYROXINE 0.05MG (50MCG) TAB] 90 tablet 1     Sig: TAKE 1 TABLET BY MOUTH EVERY DAY       Endocrinology:  Hypothyroid Agents Failed - 2/20/2020  5:34 AM        Failed - Manual Review: FT4 is not required if last TSH is WNL.        Passed - Patient is at least 18 years old        Passed - Office visit in past 12 months or future 90 days.     Recent Outpatient Visits            6 months ago Annual physical exam    [facility] Lashawn Sims MD    11 months ago Adjustment disorder with mixed anxiety and depressed mood    [facility] Lashawn Sims MD    1 year ago Constipation, chronic    [facility] Elizabet Sims MD    1 year ago Essential hypertension    [facility] Lashawn Sims MD    1 year ago Annual physical exam    [facility] Lashawn Sims MD                    Passed - TSH in normal range and within 360 days     TSH   Date Value Ref Range Status   08/06/2019 1.594 0.400 - 4.000 uIU/mL Final   09/18/2018 1.336 0.400 - 4.000 uIU/mL Final   01/02/2018 1.549 0.400 - 4.000 uIU/mL Final              Passed - T4 free within 1080 days     Free T4   Date Value Ref Range Status   01/02/2018 1.21 0.71 - 1.51 ng/dL Final   06/28/2016 1.20 0.71 - 1.51 ng/dL Final   04/27/2016 0.72 0.71 - 1.51 ng/dL Final               Appointments  past 12m or future 3m with PCP    Date Provider   Last Visit   8/6/2019 Lashawn Sims MD   Next Visit   Visit date not found Lashawn Sims MD   .  ED visits in past 90 days: 0       Note composed:1:13 PM 02/26/2020

## 2020-03-05 ENCOUNTER — TELEPHONE (OUTPATIENT)
Dept: OBSTETRICS AND GYNECOLOGY | Facility: CLINIC | Age: 70
End: 2020-03-05

## 2020-03-05 NOTE — TELEPHONE ENCOUNTER
----- Message from Melonie Everett sent at 3/5/2020 11:01 AM CST -----  Contact: [facility]  Pharmacy called to speak w/ staff on behalf of patient medication testosterone 5mg capsule  ([facility]) [phone]

## 2020-03-19 ENCOUNTER — TELEPHONE (OUTPATIENT)
Dept: INTERNAL MEDICINE | Facility: CLINIC | Age: 70
End: 2020-03-19

## 2020-03-19 NOTE — TELEPHONE ENCOUNTER
She has hiatal hernia and to have what sounds like hida scan.  Constantly nauseated.  Her Gi physician, Vito Mcdaniels, is managing    She was told that renal function was reduced - but I don't have labs.  SHe will ask Dr Bourgeois to fax to me

## 2020-05-22 ENCOUNTER — PATIENT MESSAGE (OUTPATIENT)
Dept: INTERNAL MEDICINE | Facility: CLINIC | Age: 70
End: 2020-05-22

## 2020-05-24 ENCOUNTER — PATIENT MESSAGE (OUTPATIENT)
Dept: INTERNAL MEDICINE | Facility: CLINIC | Age: 70
End: 2020-05-24

## 2020-05-25 ENCOUNTER — PATIENT MESSAGE (OUTPATIENT)
Dept: INTERNAL MEDICINE | Facility: CLINIC | Age: 70
End: 2020-05-25

## 2020-05-25 DIAGNOSIS — K83.1 BILIARY OBSTRUCTION DUE TO MALIGNANT NEOPLASM: Primary | ICD-10-CM

## 2020-05-25 DIAGNOSIS — C80.1 BILIARY OBSTRUCTION DUE TO MALIGNANT NEOPLASM: Primary | ICD-10-CM

## 2020-05-27 ENCOUNTER — DOCUMENTATION ONLY (OUTPATIENT)
Dept: HEMATOLOGY/ONCOLOGY | Facility: CLINIC | Age: 70
End: 2020-05-27

## 2020-05-27 ENCOUNTER — PATIENT MESSAGE (OUTPATIENT)
Dept: INTERNAL MEDICINE | Facility: CLINIC | Age: 70
End: 2020-05-27

## 2020-05-27 NOTE — TELEPHONE ENCOUNTER
pls make appt with Oncology - asap- ampullary vs pancreatic neoplasm.       IMPRESSION:   1. There is an infiltrative soft tissue process centered about the ute hepatis that appears to surround the CBD producing CBD dilation as well as intrahepatic dilation. There is also encasement of the SMA and celiac branches. Primary differential consideration would be an infiltrative biliary malignancy from the ampulla. This does not appear to emanate from the pancreas itself but a infiltrative pancreas neoplasm cannot be entirely excluded. There are also some small lymph nodes in the right upper quadrant/ute hepatis which potentially could represent umair spread of disease. Consider a GI/oncology consultation for further assessment.    ERCP - high grade stricture mid common hepatic duct.  Stent placed.  cbd occlusion at pancreatic head.      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  R/o primary biliary neoplasm.    Pathology:   Needle biopsy from the pancreas consists of blood and pancreatic  fragments. Mixed in with the blood, there is some necrotic debris and  a foamy histiocyte is identified and there are fragments of ductal  epithelium that is histologically benign. There are fragments of dense  fibrous scar tissue and some of these contain some pancreatic ducts  but they appear histologically benign. Some fragments of pancreatic  parenchyma are present. Some peripheral nerve is identified. No  malignancy is identified and the findings are those of a chronic  Pancreatitis    Twelve smears are examined. Few atypical groups of ductal cells are  present showing some nuclear enlargement, some variation in size and  overlap. Benign elements are also seen as well as findings of chronic  pancreatitis. See comment above for discussion.

## 2020-05-28 ENCOUNTER — TELEPHONE (OUTPATIENT)
Dept: HEMATOLOGY/ONCOLOGY | Facility: CLINIC | Age: 70
End: 2020-05-28

## 2020-05-28 NOTE — NURSING
Received referral for Dr. Singh from Dr. Hurst for pt with new diagnosis of pancraetic neoplasm.  Called Dr. Hursts office and spoke with him.  Will fax over copies of pathology, imaging report and progress notes.  Faxed request for images on CD to [facility] [address].  Dr. Singh aware of referral.

## 2020-05-29 ENCOUNTER — TELEPHONE (OUTPATIENT)
Dept: HEMATOLOGY/ONCOLOGY | Facility: CLINIC | Age: 70
End: 2020-05-29

## 2020-05-29 ENCOUNTER — OFFICE VISIT (OUTPATIENT)
Dept: HEMATOLOGY/ONCOLOGY | Facility: CLINIC | Age: 70
End: 2020-05-29
Payer: COMMERCIAL

## 2020-05-29 ENCOUNTER — LAB VISIT (OUTPATIENT)
Dept: LAB | Facility: HOSPITAL | Age: 70
End: 2020-05-29
Attending: INTERNAL MEDICINE
Payer: COMMERCIAL

## 2020-05-29 VITALS
DIASTOLIC BLOOD PRESSURE: 64 MMHG | RESPIRATION RATE: 16 BRPM | HEART RATE: 84 BPM | WEIGHT: 122.38 LBS | HEIGHT: 62 IN | SYSTOLIC BLOOD PRESSURE: 106 MMHG | TEMPERATURE: 98 F | BODY MASS INDEX: 22.52 KG/M2

## 2020-05-29 DIAGNOSIS — C25.0 MALIGNANT NEOPLASM OF HEAD OF PANCREAS: ICD-10-CM

## 2020-05-29 DIAGNOSIS — G89.3 NEOPLASM RELATED PAIN: Primary | ICD-10-CM

## 2020-05-29 DIAGNOSIS — K83.1 BILIARY OBSTRUCTION DUE TO MALIGNANT NEOPLASM: ICD-10-CM

## 2020-05-29 DIAGNOSIS — C80.1 BILIARY OBSTRUCTION DUE TO MALIGNANT NEOPLASM: ICD-10-CM

## 2020-05-29 PROBLEM — C25.9 CANCER OF PANCREAS: Status: ACTIVE | Noted: 2020-05-29

## 2020-05-29 LAB
ALBUMIN SERPL BCP-MCNC: 3.1 G/DL (ref 3.5–5.2)
ALP SERPL-CCNC: 374 U/L (ref 55–135)
ALT SERPL W/O P-5'-P-CCNC: 44 U/L (ref 10–44)
ANION GAP SERPL CALC-SCNC: 13 MMOL/L (ref 8–16)
AST SERPL-CCNC: 34 U/L (ref 10–40)
BILIRUB SERPL-MCNC: 2.7 MG/DL (ref 0.1–1)
BUN SERPL-MCNC: 20 MG/DL (ref 8–23)
CALCIUM SERPL-MCNC: 10.3 MG/DL (ref 8.7–10.5)
CANCER AG19-9 SERPL-ACNC: 76 U/ML (ref 2–40)
CHLORIDE SERPL-SCNC: 103 MMOL/L (ref 95–110)
CO2 SERPL-SCNC: 26 MMOL/L (ref 23–29)
CREAT SERPL-MCNC: 1.1 MG/DL (ref 0.5–1.4)
ERYTHROCYTE [DISTWIDTH] IN BLOOD BY AUTOMATED COUNT: 15.4 % (ref 11.5–14.5)
EST. GFR  (AFRICAN AMERICAN): 59.6 ML/MIN/1.73 M^2
EST. GFR  (NON AFRICAN AMERICAN): 51.7 ML/MIN/1.73 M^2
GLUCOSE SERPL-MCNC: 90 MG/DL (ref 70–110)
HCT VFR BLD AUTO: 38 % (ref 37–48.5)
HGB BLD-MCNC: 11.9 G/DL (ref 12–16)
IMM GRANULOCYTES # BLD AUTO: 0.03 K/UL (ref 0–0.04)
MCH RBC QN AUTO: 30.8 PG (ref 27–31)
MCHC RBC AUTO-ENTMCNC: 31.3 G/DL (ref 32–36)
MCV RBC AUTO: 98 FL (ref 82–98)
NEUTROPHILS # BLD AUTO: 4.6 K/UL (ref 1.8–7.7)
PLATELET # BLD AUTO: 325 K/UL (ref 150–350)
PMV BLD AUTO: 10 FL (ref 9.2–12.9)
POTASSIUM SERPL-SCNC: 3.3 MMOL/L (ref 3.5–5.1)
PROT SERPL-MCNC: 7.1 G/DL (ref 6–8.4)
RBC # BLD AUTO: 3.86 M/UL (ref 4–5.4)
SODIUM SERPL-SCNC: 142 MMOL/L (ref 136–145)
WBC # BLD AUTO: 7 K/UL (ref 3.9–12.7)

## 2020-05-29 PROCEDURE — 3288F PR FALLS RISK ASSESSMENT DOCUMENTED: ICD-10-PCS | Mod: CPTII,S$GLB,, | Performed by: INTERNAL MEDICINE

## 2020-05-29 PROCEDURE — 36415 COLL VENOUS BLD VENIPUNCTURE: CPT

## 2020-05-29 PROCEDURE — 1125F AMNT PAIN NOTED PAIN PRSNT: CPT | Mod: S$GLB,,, | Performed by: INTERNAL MEDICINE

## 2020-05-29 PROCEDURE — 3288F FALL RISK ASSESSMENT DOCD: CPT | Mod: CPTII,S$GLB,, | Performed by: INTERNAL MEDICINE

## 2020-05-29 PROCEDURE — 86301 IMMUNOASSAY TUMOR CA 19-9: CPT

## 2020-05-29 PROCEDURE — 99205 OFFICE O/P NEW HI 60 MIN: CPT | Mod: S$GLB,,, | Performed by: INTERNAL MEDICINE

## 2020-05-29 PROCEDURE — 3078F PR MOST RECENT DIASTOLIC BLOOD PRESSURE < 80 MM HG: ICD-10-PCS | Mod: CPTII,S$GLB,, | Performed by: INTERNAL MEDICINE

## 2020-05-29 PROCEDURE — 3078F DIAST BP <80 MM HG: CPT | Mod: CPTII,S$GLB,, | Performed by: INTERNAL MEDICINE

## 2020-05-29 PROCEDURE — 99999 PR PBB SHADOW E&M-EST. PATIENT-LVL V: ICD-10-PCS | Mod: PBBFAC,,, | Performed by: INTERNAL MEDICINE

## 2020-05-29 PROCEDURE — 3074F PR MOST RECENT SYSTOLIC BLOOD PRESSURE < 130 MM HG: ICD-10-PCS | Mod: CPTII,S$GLB,, | Performed by: INTERNAL MEDICINE

## 2020-05-29 PROCEDURE — 85027 COMPLETE CBC AUTOMATED: CPT

## 2020-05-29 PROCEDURE — 80053 COMPREHEN METABOLIC PANEL: CPT

## 2020-05-29 PROCEDURE — 1125F PR PAIN SEVERITY QUANTIFIED, PAIN PRESENT: ICD-10-PCS | Mod: S$GLB,,, | Performed by: INTERNAL MEDICINE

## 2020-05-29 PROCEDURE — 99999 PR PBB SHADOW E&M-EST. PATIENT-LVL V: CPT | Mod: PBBFAC,,, | Performed by: INTERNAL MEDICINE

## 2020-05-29 PROCEDURE — 1100F PR PT FALLS ASSESS DOC 2+ FALLS/FALL W/INJURY/YR: ICD-10-PCS | Mod: CPTII,S$GLB,, | Performed by: INTERNAL MEDICINE

## 2020-05-29 PROCEDURE — 1159F PR MEDICATION LIST DOCUMENTED IN MEDICAL RECORD: ICD-10-PCS | Mod: S$GLB,,, | Performed by: INTERNAL MEDICINE

## 2020-05-29 PROCEDURE — 1100F PTFALLS ASSESS-DOCD GE2>/YR: CPT | Mod: CPTII,S$GLB,, | Performed by: INTERNAL MEDICINE

## 2020-05-29 PROCEDURE — 3074F SYST BP LT 130 MM HG: CPT | Mod: CPTII,S$GLB,, | Performed by: INTERNAL MEDICINE

## 2020-05-29 PROCEDURE — 1159F MED LIST DOCD IN RCRD: CPT | Mod: S$GLB,,, | Performed by: INTERNAL MEDICINE

## 2020-05-29 PROCEDURE — 99205 PR OFFICE/OUTPT VISIT, NEW, LEVL V, 60-74 MIN: ICD-10-PCS | Mod: S$GLB,,, | Performed by: INTERNAL MEDICINE

## 2020-05-29 RX ORDER — LIDOCAINE AND PRILOCAINE 25; 25 MG/G; MG/G
CREAM TOPICAL
Qty: 30 G | Refills: 5 | Status: SHIPPED | OUTPATIENT
Start: 2020-05-29

## 2020-05-29 RX ORDER — TRAMADOL HYDROCHLORIDE 50 MG/1
50 TABLET ORAL EVERY 6 HOURS PRN
COMMUNITY
End: 2020-06-04 | Stop reason: ALTCHOICE

## 2020-05-29 RX ORDER — ONDANSETRON 4 MG/1
4 TABLET, FILM COATED ORAL EVERY 4 HOURS PRN
COMMUNITY
End: 2020-06-16 | Stop reason: ALTCHOICE

## 2020-05-29 RX ORDER — PROMETHAZINE HYDROCHLORIDE 25 MG/1
25 TABLET ORAL EVERY 6 HOURS PRN
Qty: 60 TABLET | Refills: 7 | Status: SHIPPED | OUTPATIENT
Start: 2020-05-29 | End: 2020-06-05

## 2020-05-29 NOTE — LETTER
May 29, 2020      Ty Hurst MD  [address] [place] [address] Blvd  [address]  [facility] [address]           [facility]  [address]  [place] [address]  Phone: [phone]          Patient: Mercedes Aviles   MR Number: [id number]   YOB: 1950   Date of Visit: 5/29/2020       Dear Dr. Ty Hurst:    Thank you for referring Mercedes Aviles to me for evaluation. Attached you will find relevant portions of my assessment and plan of care.    If you have questions, please do not hesitate to call me. I look forward to following Mercedes Aviles along with you.    Sincerely,    Amanda Singh MD    Enclosure  CC:  No Recipients    If you would like to receive this communication electronically, please contact [email or web address] or [phone] to request more information on [employer] access.    For providers and/or their staff who would like to refer a patient to [facility], please contact us through our one-stop-shop provider referral line, [facility], at [phone].    If you feel you have received this communication in error or would no longer like to receive these types of communications, please e-mail [email or web address]

## 2020-05-29 NOTE — TELEPHONE ENCOUNTER
----- Message from Anjali Hunter sent at 5/29/2020  2:35 PM CDT -----  Type:  Pharmacy Calling to Clarify an RX    Name of Caller: zach  [facility]  Prescription Name:lidocaine-prilocaine (EMLA) cream   What do they need to clarify?: inc want more info on how to use not just as needed  Best Call Back Number [phone]  Additional Information:  Call

## 2020-05-29 NOTE — PLAN OF CARE
START ON PATHWAY REGIMEN - Pancreatic Adenocarcinoma    [id number]        Oxaliplatin (Eloxatin)       Leucovorin       Irinotecan (Camptosar)       Fluorouracil     **Always confirm dose/schedule in your pharmacy ordering system**    Patient Characteristics:  No Distant Metastases, Locally Advanced, Anatomically Unresectable, First Line,   PS = 0,1, BRCA1/2 and PALB2 Mutation Absent/Unknown  Current evidence of distant metastases<= No  AJCC T Category: T4  AJCC N Category: N2  AJCC M Category: M0  AJCC 8 Stage Grouping: III  Line of Therapy: First Line  ECOG Performance Status: 0  BRCA1/2 Mutation Status: Did Not Order Test  PALB2 Mutation Status: Did Not Order Test  Intent of Therapy:  Curative Intent, Not Discussed with Patient

## 2020-05-29 NOTE — PATIENT INSTRUCTIONS
Take Zofran or phenergan every 6 hours as needed for nausea and or vomiting.  Call if more than 3 loose stools per day. Take imodium as needed for diarrhea.  Use baking soda and salt mixed with water (50%) mouth wash (gurgle and spit) atleast 3 times a day.   Call if you have mouth sores, fever more than 100.4 lasting over 1 hour or a single temperature over 101 F  Also call if loose stool more that 4 per day    Imodium  Diarrhea: take 2 tablets after first loose stool, followed by 1 tablet after each loose stool, up to 8 tablets/day      Fluorouracil, 5-FU injection  What is this medicine?  FLUOROURACIL, 5-FU (flure oh YOOR a brittany) is a chemotherapy drug. It slows the growth of cancer cells. This medicine is used to treat many types of cancer like breast cancer, colon or rectal cancer, pancreatic cancer, and stomach cancer.  How should I use this medicine?  This drug is given as an infusion or injection into a vein. It is administered in a hospital or clinic by a specially trained health care professional.  Talk to your pediatrician regarding the use of this medicine in children. Special care may be needed.  What side effects may I notice from receiving this medicine?  Side effects that you should report to your doctor or health care professional as soon as possible:  · allergic reactions like skin rash, itching or hives, swelling of the face, lips, or tongue  · low blood counts - this medicine may decrease the number of white blood cells, red blood cells and platelets. You may be at increased risk for infections and bleeding.  · signs of infection - fever or chills, cough, sore throat, pain or difficulty passing urine  · signs of decreased platelets or bleeding - bruising, pinpoint red spots on the skin, black, tarry stools, blood in the urine  · signs of decreased red blood cells - unusually weak or tired, fainting spells, lightheadedness  · breathing problems  · changes in vision  · chest pain  · mouth  sores  · nausea and vomiting  · pain, swelling, redness at site where injected  · pain, tingling, numbness in the hands or feet  · redness, swelling, or sores on hands or feet  · stomach pain  · unusual bleeding  Side effects that usually do not require medical attention (report to your doctor or health care professional if they continue or are bothersome):  · changes in finger or toe nails  · diarrhea  · dry or itchy skin  · hair loss  · headache  · loss of appetite  · sensitivity of eyes to the light  · stomach upset  · unusually teary eyes  What may interact with this medicine?  · allopurinol  · cimetidine  · dapsone  · digoxin  · hydroxyurea  · leucovorin  · levamisole  · medicines for seizures like ethotoin, fosphenytoin, phenytoin  · medicines to increase blood counts like filgrastim, pegfilgrastim, sargramostim  · medicines that treat or prevent blood clots like warfarin, enoxaparin, and dalteparin  · methotrexate  · metronidazole  · pyrimethamine  · some other chemotherapy drugs like busulfan, cisplatin, estramustine, vinblastine  · trimethoprim  · trimetrexate  · vaccines  Talk to your doctor or health care professional before taking any of these medicines:  · acetaminophen  · aspirin  · ibuprofen  · ketoprofen  · naproxen  What if I miss a dose?  It is important not to miss your dose. Call your doctor or health care professional if you are unable to keep an appointment.  Where should I keep my medicine?  This drug is given in a hospital or clinic and will not be stored at home.  What should I tell my health care provider before I take this medicine?  They need to know if you have any of these conditions:  · blood disorders  · dihydropyrimidine dehydrogenase (DPD) deficiency  · infection (especially a virus infection such as chickenpox, cold sores, or herpes)  · kidney disease  · liver disease  · malnourished, poor nutrition  · recent or ongoing radiation therapy  · an unusual or allergic reaction to  fluorouracil, other chemotherapy, other medicines, foods, dyes, or preservatives  · pregnant or trying to get pregnant  · breast-feeding  What should I watch for while using this medicine?  Visit your doctor for checks on your progress. This drug may make you feel generally unwell. This is not uncommon, as chemotherapy can affect healthy cells as well as cancer cells. Report any side effects. Continue your course of treatment even though you feel ill unless your doctor tells you to stop.  In some cases, you may be given additional medicines to help with side effects. Follow all directions for their use.  Call your doctor or health care professional for advice if you get a fever, chills or sore throat, or other symptoms of a cold or flu. Do not treat yourself. This drug decreases your body's ability to fight infections. Try to avoid being around people who are sick.  This medicine may increase your risk to bruise or bleed. Call your doctor or health care professional if you notice any unusual bleeding.  Be careful brushing and flossing your teeth or using a toothpick because you may get an infection or bleed more easily. If you have any dental work done, tell your dentist you are receiving this medicine.  Avoid taking products that contain aspirin, acetaminophen, ibuprofen, naproxen, or ketoprofen unless instructed by your doctor. These medicines may hide a fever.  Do not become pregnant while taking this medicine. Women should inform their doctor if they wish to become pregnant or think they might be pregnant. There is a potential for serious side effects to an unborn child. Talk to your health care professional or pharmacist for more information. Do not breast-feed an infant while taking this medicine.  Men should inform their doctor if they wish to father a child. This medicine may lower sperm counts.  Do not treat diarrhea with over the counter products. Contact your doctor if you have diarrhea that lasts more  than 2 days or if it is severe and watery.  This medicine can make you more sensitive to the sun. Keep out of the sun. If you cannot avoid being in the sun, wear protective clothing and use sunscreen. Do not use sun lamps or tanning beds/booths.  NOTE:This sheet is a summary. It may not cover all possible information. If you have questions about this medicine, talk to your doctor, pharmacist, or health care provider. Copyright© 2017 [employer]        Leucovorin injection  What is this medicine?  LEUCOVORIN (loo koe VOR in) is used to prevent or treat the harmful effects of some medicines. This medicine is used to treat anemia caused by a low amount of folic acid in the body. It is also used with 5-fluorouracil (5-FU) to treat colon cancer.  How should I use this medicine?  This medicine is for injection into a muscle or into a vein. It is given by a health care professional in a hospital or clinic setting.  Talk to your pediatrician regarding the use of this medicine in children. Special care may be needed.  What side effects may I notice from receiving this medicine?  Side effects that you should report to your doctor or health care professional as soon as possible:  · allergic reactions like skin rash, itching or hives, swelling of the face, lips, or tongue  · breathing problems  · fever, infection  · mouth sores  · unusual bleeding or bruising  · unusually weak or tired  Side effects that usually do not require medical attention (report to your doctor or health care professional if they continue or are bothersome):  · constipation or diarrhea  · loss of appetite  · nausea, vomiting  What may interact with this medicine?  · capecitabine  · fluorouracil  · phenobarbital  · phenytoin  · primidone  · trimethoprim-sulfamethoxazole  What if I miss a dose?  This does not apply.  Where should I keep my medicine?  This drug is given in a hospital or clinic and will not be stored at home.  What should I tell my health  care provider before I take this medicine?  They need to know if you have any of these conditions:  · anemia from low levels of vitamin B-12 in the blood  · an unusual or allergic reaction to leucovorin, folic acid, other medicines, foods, dyes, or preservatives  · pregnant or trying to get pregnant  · breast-feeding  What should I watch for while using this medicine?  Your condition will be monitored carefully while you are receiving this medicine.  This medicine may increase the side effects of 5-fluorouracil, 5-FU. Tell your doctor or health care professional if you have diarrhea or mouth sores that do not get better or that get worse.  NOTE:This sheet is a summary. It may not cover all possible information. If you have questions about this medicine, talk to your doctor, pharmacist, or health care provider. Copyright© 2017 [employer]        Oxaliplatin Injection  What is this medicine?  OXALIPLATIN (ox AL i JOANIE tin) is a chemotherapy drug. It targets fast dividing cells, like cancer cells, and causes these cells to die. This medicine is used to treat cancers of the colon and rectum, and many other cancers.  How should I use this medicine?  This drug is given as an infusion into a vein. It is administered in a hospital or clinic by a specially trained health care professional.  Talk to your pediatrician regarding the use of this medicine in children. Special care may be needed.  What side effects may I notice from receiving this medicine?  Side effects that you should report to your doctor or health care professional as soon as possible:  · allergic reactions like skin rash, itching or hives, swelling of the face, lips, or tongue  · low blood counts - This drug may decrease the number of white blood cells, red blood cells and platelets. You may be at increased risk for infections and bleeding.  · signs of infection - fever or chills, cough, sore throat, pain or difficulty passing urine  · signs of decreased  platelets or bleeding - bruising, pinpoint red spots on the skin, black, tarry stools, nosebleeds  · signs of decreased red blood cells - unusually weak or tired, fainting spells, lightheadedness  · breathing problems  · chest pain, pressure  · cough  · diarrhea  · jaw tightness  · mouth sores  · nausea and vomiting  · pain, swelling, redness or irritation at the injection site  · pain, tingling, numbness in the hands or feet  · problems with balance, talking, walking  · redness, blistering, peeling or loosening of the skin, including inside the mouth  · trouble passing urine or change in the amount of urine  Side effects that usually do not require medical attention (report to your doctor or health care professional if they continue or are bothersome):  · changes in vision  · constipation  · hair loss  · loss of appetite  · metallic taste in the mouth or changes in taste  · stomach pain  What may interact with this medicine?  · medicines to increase blood counts like filgrastim, pegfilgrastim, sargramostim  · probenecid  · some antibiotics like amikacin, gentamicin, neomycin, polymyxin B, streptomycin, tobramycin  · zalcitabine  Talk to your doctor or health care professional before taking any of these medicines:  · acetaminophen  · aspirin  · ibuprofen  · ketoprofen  · naproxen  What if I miss a dose?  It is important not to miss a dose. Call your doctor or health care professional if you are unable to keep an appointment.  Where should I keep my medicine?  This drug is given in a hospital or clinic and will not be stored at home.  What should I tell my health care provider before I take this medicine?  They need to know if you have any of these conditions:  · kidney disease  · an unusual or allergic reaction to oxaliplatin, other chemotherapy, other medicines, foods, dyes, or preservatives  · pregnant or trying to get pregnant  · breast-feeding  What should I watch for while using this medicine?  Your condition  will be monitored carefully while you are receiving this medicine. You will need important blood work done while you are taking this medicine.  This medicine can make you more sensitive to cold. Do not drink cold drinks or use ice. Cover exposed skin before coming in contact with cold temperatures or cold objects. When out in cold weather wear warm clothing and cover your mouth and nose to warm the air that goes into your lungs. Tell your doctor if you get sensitive to the cold.  This drug may make you feel generally unwell. This is not uncommon, as chemotherapy can affect healthy cells as well as cancer cells. Report any side effects. Continue your course of treatment even though you feel ill unless your doctor tells you to stop.  In some cases, you may be given additional medicines to help with side effects. Follow all directions for their use.  Call your doctor or health care professional for advice if you get a fever, chills or sore throat, or other symptoms of a cold or flu. Do not treat yourself. This drug decreases your body's ability to fight infections. Try to avoid being around people who are sick.  This medicine may increase your risk to bruise or bleed. Call your doctor or health care professional if you notice any unusual bleeding.  Be careful brushing and flossing your teeth or using a toothpick because you may get an infection or bleed more easily. If you have any dental work done, tell your dentist you are receiving this medicine.  Avoid taking products that contain aspirin, acetaminophen, ibuprofen, naproxen, or ketoprofen unless instructed by your doctor. These medicines may hide a fever.  Do not become pregnant while taking this medicine. Women should inform their doctor if they wish to become pregnant or think they might be pregnant. There is a potential for serious side effects to an unborn child. Talk to your health care professional or pharmacist for more information. Do not breast-feed an  infant while taking this medicine.  Call your doctor or health care professional if you get diarrhea. Do not treat yourself.  NOTE:This sheet is a summary. It may not cover all possible information. If you have questions about this medicine, talk to your doctor, pharmacist, or health care provider. Copyright© 2017 [employer]        Irinotecan injection  What is this medicine?  IRINOTECAN (ir in oh KRISTINA norma ) is a chemotherapy drug. It is used to treat colon and rectal cancer.  How should I use this medicine?  This drug is given as an infusion into a vein. It is administered in a hospital or clinic by a specially trained health care professional.  Talk to your pediatrician regarding the use of this medicine in children. Special care may be needed.  What side effects may I notice from receiving this medicine?  Side effects that you should report to your doctor or health care professional as soon as possible:  · allergic reactions like skin rash, itching or hives, swelling of the face, lips, or tongue  · low blood counts - this medicine may decrease the number of white blood cells, red blood cells and platelets. You may be at increased risk for infections and bleeding.  · signs of infection - fever or chills, cough, sore throat, pain or difficulty passing urine  · signs of decreased platelets or bleeding - bruising, pinpoint red spots on the skin, black, tarry stools, blood in the urine  · signs of decreased red blood cells - unusually weak or tired, fainting spells, lightheadedness  · breathing problems  · chest pain  · diarrhea  · feeling faint or lightheaded, falls  · flushing, runny nose, sweating during infusion  · mouth sores or pain  · pain, swelling, redness or irritation where injected  · pain, swelling, warmth in the leg  · pain, tingling, numbness in the hands or feet  · problems with balance, talking, walking  · stomach cramps, pain  · trouble passing urine or change in the amount of urine  · vomiting as  to be unable to hold down drinks or food  · yellowing of the eyes or skin  Side effects that usually do not require medical attention (report to your doctor or health care professional if they continue or are bothersome):  · constipation  · hair loss  · headache  · loss of appetite  · nausea, vomiting  · stomach upset  What may interact with this medicine?  Do not take this medicine with any of the following medications:  · atazanavir  · certain medicines for fungal infections like itraconazole and ketoconazole  · [place]'s Wort  This medicine may also interact with the following medications:  · dexamethasone  · diuretics  · laxatives  · medicines for seizures like carbamazepine, mephobarbital, phenobarbital, phenytoin, primidone  · medicines to increase blood counts like filgrastim, pegfilgrastim, sargramostim  · prochlorperazine  · vaccines  What if I miss a dose?  It is important not to miss your dose. Call your doctor or health care professional if you are unable to keep an appointment.  Where should I keep my medicine?  This drug is given in a hospital or clinic and will not be stored at home.  What should I tell my health care provider before I take this medicine?  They need to know if you have any of these conditions:  · blood disorders  · dehydration  · diarrhea  · infection (especially a virus infection such as chickenpox, cold sores, or herpes)  · liver disease  · low blood counts, like low white cell, platelet, or red cell counts  · recent or ongoing radiation therapy  · an unusual or allergic reaction to irinotecan, sorbitol, other chemotherapy, other medicines, foods, dyes, or preservatives  · pregnant or trying to get pregnant  · breast-feeding  What should I watch for while using this medicine?  Your condition will be monitored carefully while you are receiving this medicine. You will need important blood work done while you are taking this medicine.  This drug may make you feel generally unwell.  This is not uncommon, as chemotherapy can affect healthy cells as well as cancer cells. Report any side effects. Continue your course of treatment even though you feel ill unless your doctor tells you to stop.  In some cases, you may be given additional medicines to help with side effects. Follow all directions for their use.  You may get drowsy or dizzy. Do not drive, use machinery, or do anything that needs mental alertness until you know how this medicine affects you. Do not stand or sit up quickly, especially if you are an older patient. This reduces the risk of dizzy or fainting spells.  Call your doctor or health care professional for advice if you get a fever, chills or sore throat, or other symptoms of a cold or flu. Do not treat yourself. This drug decreases your body's ability to fight infections. Try to avoid being around people who are sick.  This medicine may increase your risk to bruise or bleed. Call your doctor or health care professional if you notice any unusual bleeding.  Be careful brushing and flossing your teeth or using a toothpick because you may get an infection or bleed more easily. If you have any dental work done, tell your dentist you are receiving this medicine.  Avoid taking products that contain aspirin, acetaminophen, ibuprofen, naproxen, or ketoprofen unless instructed by your doctor. These medicines may hide a fever.  Do not become pregnant while taking this medicine. Women should inform their doctor if they wish to become pregnant or think they might be pregnant. There is a potential for serious side effects to an unborn child. Talk to your health care professional or pharmacist for more information. Do not breast-feed an infant while taking this medicine.  NOTE:This sheet is a summary. It may not cover all possible information. If you have questions about this medicine, talk to your doctor, pharmacist, or health care provider. Copyright© 2017 [employer]        Nutrition During  Chemotherapy     Drink plenty of liquids, such as water.     During chemotherapy, the energy provided by a healthy diet can help you rebuild normal cells. It can also help you keep up your strength and fight infection. As a result, you may feel better and be more able to cope with side effects. Ask your doctor about your nutrition needs.  Drink plenty of fluids  · Fluids help the body produce urine and decrease constipation. They help prevent kidney and bladder problems. They also help replace fluids lost from vomiting and diarrhea.  · Try water, unsweetened juices, and other flavored drinks without caffeine. They flush toxins from the body.  Get enough calories  · Calories are fuel. The body uses this fuel to perform all of its functions, including healing.  · Its OK to be lean, but be sure you are not underweight. If you are, try eating more calories.  · Eat calorie-dense foods such as avocados, peanut butter, eggs, and ice cream.  · If you need extra calories, add butter, gravy, and sauces to foods (if tolerated).  · If you don't need the extra calories, try to limit foods that are fried, greasy, or high in fat or added sugar.  Eat protein, fruits, and vegetables  · Protein builds muscle, bone, skin, and blood. It helps your body heal and fight infection. It also helps boost your energy level.  · Good protein choices include yogurt, eggs, chicken, lean meats, beans, and peanut butter.  · Fruits and vegetables are full of important vitamins, minerals, and fiber to help your body function properly.  · Try to eat a variety of vegetables, fruits, whole grains, and beans.  · Ask your doctor about instant protein powder or other supplements.  Eating right during treatment  Side effects may make it a little harder to eat well on some days. The following tips will help you continue to get the nutrition you need:  · Be open to new foods and recipes.  · Eat small portions often and slowly.  · Have a healthy snack instead  "of a meal if you are not very hungry.  · Try eating in a new setting.  · Physical activity, such as walking, can help increase your appetite. Try to be active for at least 30 minutes each day.  · Boost your diet by getting the vitamins and minerals you need from fruits, vegetables, and whole grains.  · If you live alone and are not up to cooking, ask your healthcare provider about Meals on Wheels or other outreach programs.  · Sometimes, it is best to follow your appetitie. Eat when you are hungry, but when you ar enot, forcing yourself to eat can make you feel bad, nauseated, or even cause you to vomit.   Date Last Reviewed: 1/6/2016 © 2000-2017 [employer]. [address] [place] [address], [place] [address]. All rights reserved. This information is not intended as a substitute for professional medical care. Always follow your healthcare professional's instructions.        Understanding Chemotherapy     Chemotherapy is often given in an outpatient setting.      Chemotherapy (chemo) is a treatment for cancer. Chemo can be a single medication. Or, it can be a combination of medicines. When used alone or along with surgery or radiation therapy, it can often shrink a tumor or prevent its spread.  How chemotherapy works  Chemotherapy kills cells that grow quickly.  Cancer cells can be fast growing cells; but many healthy cells grow fast, too.  Fast growing cells of the mouth, stomach lining, bone marrow, skin and hair are able to grow back, but cancer cells that die, are not.  That is why side effects such as hair loss, nausea, and low blood cell counts resolve with time.  Usually, chemotherapy is given in "cycles" of treatment.  A "cycle" is the time from one cancer treatment to the next.  For example, if a treatment is given 2 weeks in a row, and then one week off, it is referred to as a 3 week cycle.  If a treatment is given once every 3 week, it is referred to as a 3 week cycle, too.  Time between treatments " (during the cycle) is necessary to let normal cell recover before the next treatment.  The goals of chemotherapy  Chemo can kill cancer cells. As a result, it may do the following:  · Shrink cancer before surgery (neoadjuvent care)  · Rid the body of cancer cells that remain after surgery (adjuvent care)  · Reduce symptoms (such as pain) (palliative care)  · Control cancer for a period of time (palliative care)  · Cause remission (no evidence of the disease on medical testing)  · Cure cancer (no evidence of the disease years after treatment)  Side effects of chemotherapy  When healthy cells are damaged, side effects may develop including:  · Nausea and vomiting  · Hair loss  · Anemia (low red blood cell count)  · Infections  · Bleeding  · Mouth and throat sores  · Skin changes (dry skin, itching, acne)  · Lack of interest in sex  · Trouble remembering and concentrating  · Stress and depression  Long-term risks and complications  There are some long-term risks with chemo. But the benefits usually outweigh the risks. Risks depend on the type of chemo used. Some possible long-term risks include:  · Infertility  · Damage to certain organs, such as the heart, kidneys, liver, or lungs  · Lasting nerve damage  · Another cancer forming at a later time  Date Last Reviewed: 12/27/2015  © 6126-1385 [employer]. [address] [place] [address], [place] [address]. All rights reserved. This information is not intended as a substitute for professional medical care. Always follow your healthcare professional's instructions.        Chemotherapy: Common Questions About Activity During Treatment     Working from home may be an option.   You may have questions about how chemotherapy could affect the things you take for granted in everyday life. Here are some answers to common questions, and some of the adjustments you may need to make.  Will I still be able to work?  Many people do still work during chemotherapy. If you find you  have less energy, you may need to talk with your employer about adjusting your schedule:  · Work at home or reduce the number of hours you work.  · Plan time off that fits best with your treatment cycle.  Should I exercise?  Ask your healthcare provider about starting an exercise program. It may help you sleep better and sometimes even helps control your appetite. It is also good for your sense of well-being:  · Exercise when you feel most energetic.  · Keep the pace moderate. Even small amounts of exercise can help. Instead of jogging, walk or ride a stationary bike.  Will I be affected sexually?  Chemotherapy can cause sexual changes in men and women:  · You may notice changes in your desire to have sex. Hugging and cuddling may seem more important now.  · Chemotherapy can cause short-term or permanent infertility. Talk to your healthcare provider if you are planning on having children. Men may want to bank, or freeze, sperm.  · Most chemotherapy drugs can cause birth defects if taken during pregnancy. Talk to your healthcare provider about whether you need to use birth control throughout treatment.  Date Last Reviewed: 12/27/2015  © 8456-1381 [employer]. [address] [place] [address]. All rights reserved. This information is not intended as a substitute for professional medical care. Always follow your healthcare professional's instructions.

## 2020-05-29 NOTE — PROGRESS NOTES
Subjective:       Patient ID: Mercedes Aviles is a 70 y.o. female.    Chief Complaint: Pancreatic Cancer (consult)  Referring Physician: Dr. Candice MD     [facility]. Stefan is a 70 year old female with HTN, HPL,. Menier's disease who underwent a Nissen Fundoplication and laparoscopic cholecystectomy on 4/21/2020. She presented to ED on 4/25/2020 with nausea and and temp of 99.4. Right upper quadrant ultrasound did not reveal ductal dilation or stones.  She then returned back to ED on 5/8/2020 with jaundice and was noted to have lab with tbili 12, ,  Alkphos 1641.  She underwent Ct scans on 5/8/2020 which revealed soft tissue infiltrative mass, measuring 5.3 cm X 2.8 X 4.3 cm, centered at the ute hepatis that appears to surround the CBD producing CBD as well as intrahepatic dilation. There is encasement of the SMA and celiac branches. It does not appear to arise from the pancreas but infiltrative pancreas neoplasm cannot be excluded. Lymph nodes noted in rigt upper quadrant/ute hepatis. Also stomach wall thickening, venous structures within left hepatic lobe are diminutive raising the possibility of stricture due to infiltrative spread of neoplasm or possible arterial thrombus. Focus noted in right ovary measuring 1.5 cm, appears non specific.  She underwent EUS on 5/11/2020 revealed pancreatic parenchymal abnormalities in the genu of the pancreas, these include 2X 3 cm area / mass and diffuse dilatation of intra hepatic bile ducts. PAth from this procedure revealed atypical groups of ductal cells in a background of chronic pancreatitis.    She then underwent MRI MRCP on 5/12/2020, which revealed biliary duct dilatation with abrupt severe narrowing or occlusion of CBC near the ampulla of vater at the level of pancreatic head. Also infiltrative soft tissue within ute hepatis and retroperitoneum surrounding the celiac and SMA remains nonspecific, differential includes pancreas versus ampullary  neoplasm, significant narrowing of portal vein within the infiltrative mass in ute hepatis, small amount of ascitis.  ERCP on 5/13/2020 and CHD stricture was noted which was brushed and stent placed, also stent placed in PD. Path was negative for malignancy    She then underwent a repeat EUS on 5/20/2020 and was noted to have an abnormal area of poorly defined, hypoechoic, soft tissue density in the area of celiac trunk (actually encasing the vessel) measuring about 2.5 cm.   She also underwent ERCP on same day and was noted to have a single stricture in the CHD which was brushed X 3 and stent was placed.   This final FNA from above reveals pancreatic ductal adenocarcinoma with perineural invasion.  She comes in to discuss further management. She is accompanied by her . She has lost about 20 lbs since the onset of her symptoms in March 2020. She notes early satiety and gagging, denies vomiting. Has bowel movements once a week. She has lower and mid abdomen pain and is taking Tramadol every 6 hours during the day, but at night she takes one table late evening and does well the whole night  ECOG PS is zero.        Review of Systems   Constitutional: Positive for appetite change, fatigue and unexpected weight change.   HENT: Negative for mouth sores.    Eyes: Negative for visual disturbance.   Respiratory: Negative for cough and shortness of breath.    Cardiovascular: Negative for chest pain.   Gastrointestinal: Positive for abdominal pain. Negative for diarrhea.   Genitourinary: Negative for frequency.   Musculoskeletal: Negative for back pain.   Skin: Negative for rash.   Neurological: Negative for headaches.   Hematological: Negative for adenopathy.   Psychiatric/Behavioral: The patient is not nervous/anxious.    All other systems reviewed and are negative.      Objective:      Physical Exam   Constitutional: She is oriented to person, place, and time. She appears well-developed and well-nourished. She is  cooperative. No distress.   HENT:   Head: Normocephalic and atraumatic.   Right Ear: Hearing, tympanic membrane, external ear and ear canal normal. No decreased hearing is noted.   Left Ear: Hearing, tympanic membrane, external ear and ear canal normal. No decreased hearing is noted.   Nose: No mucosal edema, rhinorrhea, sinus tenderness or septal deviation. No epistaxis.   Mouth/Throat: Oropharynx is clear and moist. No oral lesions. Normal dentition. No dental caries. No oropharyngeal exudate.   Tympanic membranes and external auditory canals are normal  Nasal turbinates are normal   Eyes: Pupils are equal, round, and reactive to light. Conjunctivae, EOM and lids are normal. Lids are everted and swept, no foreign bodies found. Right eye exhibits discharge. Left eye exhibits no discharge. No scleral icterus.   Neck: Trachea normal and normal range of motion. Neck supple. No JVD present. No muscular tenderness present. Carotid bruit is not present. No edema, no erythema and normal range of motion present. No thyroid mass and no thyromegaly present.   Cardiovascular: Normal rate, regular rhythm, S1 normal, S2 normal, normal heart sounds, intact distal pulses and normal pulses.  No extrasystoles are present. Exam reveals no gallop, no friction rub and no decreased pulses.   No murmur heard.  No swelling, edema or tenderness.  Distal pulses are normal   Pulmonary/Chest: Effort normal and breath sounds normal. She has no decreased breath sounds. She has no wheezes. She has no rhonchi. She has no rales. She exhibits no mass, no tenderness and no edema. Right breast exhibits no mass and no nipple discharge. Left breast exhibits no mass and no nipple discharge.   Abdominal: Soft. Normal appearance and bowel sounds are normal. She exhibits no shifting dullness, no distension, no ascites and no mass. There is no splenomegaly or hepatomegaly. There is tenderness in the epigastric area and left lower quadrant.    Musculoskeletal: Normal range of motion. She exhibits no edema or tenderness.   No clubbing, no cyanosis, no petechiae   Lymphadenopathy:        Head (right side): No submental and no submandibular adenopathy present.        Head (left side): No submental and no submandibular adenopathy present.     She has no cervical adenopathy.     She has no axillary adenopathy.        Right: No inguinal and no supraclavicular adenopathy present.        Left: No inguinal and no supraclavicular adenopathy present.   Neurological: She is alert and oriented to person, place, and time. She has normal strength and normal reflexes. She displays normal reflexes. No cranial nerve deficit or sensory deficit. She exhibits normal muscle tone. She displays a negative Romberg sign. Coordination normal.   Gait is normal   Skin: Skin is warm, dry and intact. No petechiae, no purpura and no rash noted. She is not diaphoretic. No cyanosis or erythema. No pallor. Nails show no clubbing.   Psychiatric: She has a normal mood and affect. Her speech is normal and behavior is normal. Judgment and thought content normal. Cognition and memory are normal.       LABS:  WBC   Date Value Ref Range Status   05/29/2020 7.00 3.90 - 12.70 K/uL Final     Hemoglobin   Date Value Ref Range Status   05/29/2020 11.9 (L) 12.0 - 16.0 g/dL Final     Hematocrit   Date Value Ref Range Status   05/29/2020 38.0 37.0 - 48.5 % Final     Platelets   Date Value Ref Range Status   05/29/2020 325 150 - 350 K/uL Final     Gran # (ANC)   Date Value Ref Range Status   05/29/2020 4.6 1.8 - 7.7 K/uL Final     Comment:     The ANC is based on a white cell differential from an   automated cell counter. It has not been microscopically   reviewed for the presence of abnormal cells. Clinical   correlation is required.         Chemistry        Component Value Date/Time     05/29/2020 1439    K 3.3 (L) 05/29/2020 1439     05/29/2020 1439    CO2 26 05/29/2020 1439    BUN 20  05/29/2020 1439    CREATININE 1.1 05/29/2020 1439    GLU 90 05/29/2020 1439        Component Value Date/Time    CALCIUM 10.3 05/29/2020 1439    ALKPHOS 374 (H) 05/29/2020 1439    AST 34 05/29/2020 1439    ALT 44 05/29/2020 1439    BILITOT 2.7 (H) 05/29/2020 1439    ESTGFRAFRICA 59.6 (A) 05/29/2020 1439    EGFRNONAA 51.7 (A) 05/29/2020 1439        CA 19-9: 76     Assessment:       1. Malignant neoplasm of head of pancreas    2. 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 Reviewed all outside records, scans will be uploaded into Epic. Appears to have locally advanced pancreas/ampullary cancer. Once scans are uploaded will discuss with surgical oncology. Appears locally advanced. We discussed starting FOLFIRINOX and she is agreeable. Will need PORT placement and will start after that.  Side effects were discussed as well. She was visibly upset and tearful during the visit today, offered for her to meet with psychology, she will decide and let me know next week,  Will plan on starting as soon as auth is completed.    Above care plan was discussed with patient and accompanying  and all questions were addressed to their satisfaction   Total visit time was hour and half with all of it spent face to face.    Case discussed in upper GI tumor board, Her scans from [facility] were reviewed, her tumor is not resectable as there is complete encasement of celiac and SMA. Discussed with  about the palliative nature of her treatment  She is scheduled for PORT placement and then well start chemo as soon as auth is done.  Also escribed Dialudid,. Again discussed seeing Psychology and they will let me know

## 2020-06-01 ENCOUNTER — TUMOR BOARD CONFERENCE (OUTPATIENT)
Dept: SURGERY | Facility: CLINIC | Age: 70
End: 2020-06-01

## 2020-06-01 ENCOUNTER — TELEPHONE (OUTPATIENT)
Dept: HEMATOLOGY/ONCOLOGY | Facility: CLINIC | Age: 70
End: 2020-06-01

## 2020-06-01 RX ORDER — HYDROMORPHONE HYDROCHLORIDE 2 MG/1
2 TABLET ORAL
Qty: 120 TABLET | Refills: 0 | Status: SHIPPED | OUTPATIENT
Start: 2020-06-01

## 2020-06-01 NOTE — TELEPHONE ENCOUNTER
----- Message from Elizabeth Whittington sent at 6/1/2020  2:09 PM CDT -----  Contact: Ju alvarado/ Angela  tel:  [phone]   Ref:   HYDROMORPHONE .  15 day supply.     The way it is written is over the maxium recommended dose.   Needs a drs' override for this.   Pls call today.

## 2020-06-01 NOTE — TELEPHONE ENCOUNTER
"----- Message from Nito Soliman sent at 6/1/2020 12:58 PM CDT -----  Contact: Gerardo  Callback Request     Caller: Gerardo alvarado/ [facility]  Contact Preference:[phone]    Phone call to:  Norma Stoner RN  Provider: Dr. Singh       Additional Notes:  "Thank you for all that you do for our patients'"  "

## 2020-06-01 NOTE — TELEPHONE ENCOUNTER
Spoke with [facility]---they will send specimen fed-ex to us in the next day.      Nurse will then request for path block to be sent for STRATA after read.    Message routed to dr garcia (just [facility])

## 2020-06-01 NOTE — PROGRESS NOTES
[facility] UGI MULTIDISCIPLINARY TUMOR BOARD  PATIENT REVIEW FORM   ____________________________________________________________    CLINIC #: [id number]  DATE: 6/1/2020    DIAGNOSIS: pancreas cancer     PRESENTER: Samantha    PATIENT SUMMARY:   This 69 y/o female underwent nissen, cholecystectomy in 4/2020. She felt worse after surgery so she presented to ED.   In May she developed jaundice. She underwent EUS which identified soft tissue hypodensity. Then repeat EGD with bx and finally pathology confirmed pancreatic ductal adenocarcinoma. She had ERCP with stent placement.  Non contrast CT imaging from [facility] was reviewed. There is a mass arising from body of pancreas, SMA completely encased with tumor, and involves celiac axis.    CA 19-9 = 79    BOARD RECOMMENDATIONS:   Unresectable pancreas cancer  Recommend palliative chemotherapy.     CONSULT NEEDED:     [] Surgery    [x] Hem/Onc    [] Rad/Onc    [] Dietary                 [] Social Service    [] Psychology       [] AES  [] Radiology     Clinical Stage: Tumor ** Node(s) ** Metastasis **     GROUP STAGE:  [] O    [] 1A    [] IB    [] IIA    [] IIB     [] IIIA     [] IIIB     [] IIIC    []IV                               [] Local recurrence     [] Regional recurrence     [] Distant recurrence Metastatic site(s): none         [x] Isis'l Treatment Guidelines reviewed and care planned is consistent with guidelines.         (i.e., NCCN, NCI, PD, ACO, AUA, etc.)    PRESENTATION AT CANCER CONFERENCE:         [x] Prospective    [] Retrospective     [] Follow-Up

## 2020-06-04 ENCOUNTER — TELEPHONE (OUTPATIENT)
Dept: HEMATOLOGY/ONCOLOGY | Facility: CLINIC | Age: 70
End: 2020-06-04

## 2020-06-04 DIAGNOSIS — G89.3 NEOPLASM RELATED PAIN: Primary | ICD-10-CM

## 2020-06-04 RX ORDER — TRAMADOL HYDROCHLORIDE 50 MG/1
50 TABLET ORAL EVERY 6 HOURS PRN
Qty: 60 TABLET | Refills: 0 | Status: SHIPPED | OUTPATIENT
Start: 2020-06-04 | End: 2020-06-16

## 2020-06-04 NOTE — TELEPHONE ENCOUNTER
Addended by: KELECHI MEJÍA on: 6/4/2020 11:24 AM     Modules accepted: Orders     Called pt, no answer. appt rescheduled to 8am on 1/31, provider will not be available

## 2020-06-04 NOTE — TELEPHONE ENCOUNTER
Spoke with Dr. Steve Ray at [facility] pancreas cancer center and reviewed her scans and treatment plan. Dr. Ray is agreeable with the plan of chemo with FOLFIRINOX. Spoke with , Mr. Aviles and discussed the above plan,.    He wanted to try the dilaudid on her but feels that the Tramadol 50 mg every 8 hours is helping her well. Scheduled to see General Surgery on Monday for PORT placement. Will start chemo as soon as auth is completed.

## 2020-06-05 ENCOUNTER — TELEPHONE (OUTPATIENT)
Dept: HEMATOLOGY/ONCOLOGY | Facility: CLINIC | Age: 70
End: 2020-06-05

## 2020-06-05 NOTE — TELEPHONE ENCOUNTER
----- Message from Nito Soliman sent at 6/5/2020 12:59 PM CDT -----  Contact: Dr. Greer w/ [facility]  Reason for Call:  Pharmacy calling for Prior Authorization     Name of caller: Dr. Greer   Pharmacy name: [facility]   Phone Number: [phone]    What do they need to clarify:  The request Udenyca is not prefer will be denied without additional info.  If initial prescription of Udenyca cancel and prefer medication Neulasta request will be approved.  Please contact within next 2 hrs at [phone]    Request for caller to be placed on hold . IM nurse for provider . Attempt to soft transfer call  If nurse unavailable  send message to provider box urgent

## 2020-06-05 NOTE — TELEPHONE ENCOUNTER
Spoke with dr heredia. He states udencya is not preferred. neulasta will be automatically approved. Nurse informed him she would ask dr garcia to adjust the orders on Monday. He thanked nurse        Message routed to dr garcia

## 2020-06-05 NOTE — TELEPHONE ENCOUNTER
----- Message from Krystin Jurado sent at 6/5/2020  2:27 PM CDT -----  Contact: Dr heredia      Name of caller: Dr. Heredia   Pharmacy name: [facility]   Phone Number: [phone]    He states that pt will be dcl if he can't get a call back within a hour. Please call

## 2020-06-08 ENCOUNTER — TELEPHONE (OUTPATIENT)
Dept: SURGERY | Facility: CLINIC | Age: 70
End: 2020-06-08

## 2020-06-08 ENCOUNTER — ANESTHESIA EVENT (OUTPATIENT)
Dept: SURGERY | Facility: HOSPITAL | Age: 70
End: 2020-06-08
Payer: COMMERCIAL

## 2020-06-08 ENCOUNTER — OFFICE VISIT (OUTPATIENT)
Dept: SURGERY | Facility: CLINIC | Age: 70
End: 2020-06-08
Payer: COMMERCIAL

## 2020-06-08 ENCOUNTER — CLINICAL SUPPORT (OUTPATIENT)
Dept: URGENT CARE | Facility: CLINIC | Age: 70
End: 2020-06-08
Payer: COMMERCIAL

## 2020-06-08 VITALS
HEART RATE: 90 BPM | DIASTOLIC BLOOD PRESSURE: 66 MMHG | BODY MASS INDEX: 21.7 KG/M2 | TEMPERATURE: 98 F | HEIGHT: 62 IN | SYSTOLIC BLOOD PRESSURE: 114 MMHG | WEIGHT: 117.94 LBS

## 2020-06-08 DIAGNOSIS — C25.0 MALIGNANT NEOPLASM OF HEAD OF PANCREAS: ICD-10-CM

## 2020-06-08 DIAGNOSIS — C25.0 MALIGNANT NEOPLASM OF HEAD OF PANCREAS: Primary | ICD-10-CM

## 2020-06-08 LAB — SARS-COV-2 RNA RESP QL NAA+PROBE: NOT DETECTED

## 2020-06-08 PROCEDURE — 99499 NO LOS: ICD-10-PCS | Mod: S$GLB,,, | Performed by: SURGERY

## 2020-06-08 PROCEDURE — U0003 INFECTIOUS AGENT DETECTION BY NUCLEIC ACID (DNA OR RNA); SEVERE ACUTE RESPIRATORY SYNDROME CORONAVIRUS 2 (SARS-COV-2) (CORONAVIRUS DISEASE [COVID-19]), AMPLIFIED PROBE TECHNIQUE, MAKING USE OF HIGH THROUGHPUT TECHNOLOGIES AS DESCRIBED BY CMS-2020-01-R: HCPCS

## 2020-06-08 PROCEDURE — 99999 PR PBB SHADOW E&M-EST. PATIENT-LVL V: ICD-10-PCS | Mod: PBBFAC,,, | Performed by: SURGERY

## 2020-06-08 PROCEDURE — 99999 PR PBB SHADOW E&M-EST. PATIENT-LVL V: CPT | Mod: PBBFAC,,, | Performed by: SURGERY

## 2020-06-08 PROCEDURE — 99499 UNLISTED E&M SERVICE: CPT | Mod: S$GLB,,, | Performed by: SURGERY

## 2020-06-08 NOTE — TELEPHONE ENCOUNTER
For some reason it is not l;etting me select Neulasta., Can you see if pharmacy can add to day4. I removed Udenyca. Thanks

## 2020-06-08 NOTE — LETTER
June 8, 2020      Amanda Singh MD  [address]  [place] [address]           [facility] - General Surgery  [address]  [place] [address]  Phone: [phone]          Patient: Mercedes Aviles   MR Number: [id number]   YOB: 1950   Date of Visit: 6/8/2020       Dear Dr. Amanda Singh:    Thank you for referring Mercedes Aviles to me for evaluation. Attached you will find relevant portions of my assessment and plan of care.    If you have questions, please do not hesitate to call me. I look forward to following Mercedes Aviles along with you.    Sincerely,    Raleigh Dye MD    Enclosure  CC:  No Recipients    If you would like to receive this communication electronically, please contact [email or web address] or [phone] to request more information on [employer] [facility] Link access.    For providers and/or their staff who would like to refer a patient to [facility], please contact us through our one-stop-shop provider referral line, [facility], at [phone].    If you feel you have received this communication in error or would no longer like to receive these types of communications, please e-mail [email or web address]

## 2020-06-08 NOTE — ANESTHESIA PREPROCEDURE EVALUATION
[facility]  Anesthesia Pre-Operative Evaluation         Patient Name: Mercedes Aviles  YOB: 1950  MRN: [id number]    SUBJECTIVE:     Pre-operative evaluation for Procedure(s) (LRB):  [id number], RIGHT POSS LEFT (Right)     06/08/2020    Mercedes Aviles is a 70 y.o. female w/ a significant PMHx of HTN, Meniere's disease, hypothyroidism, stage 4 pancreatic cancer, PONV    Patient now presents for the above procedure(s).      LDA: None documented.       Prev airway: 5/20/2020 DL Miller2  Grade 1 view  Drips: None documented.      Patient Active Problem List   Diagnosis    Anxiety    Hypertension    Menopause syndrome    Allergic contact dermatitis    Hypothyroidism    Meniere disease    Cancer of pancreas       Review of patient's allergies indicates:   Allergen Reactions    Celebrex [celecoxib] Anxiety     Elevated BP    Topiramate Anaphylaxis    Latex Rash    Simvastatin Rash       Current Inpatient Medications:      No current facility-administered medications on file prior to encounter.      Current Outpatient Medications on File Prior to Encounter   Medication Sig Dispense Refill    amLODIPine (NORVASC) 5 MG tablet TAKE 1 TABLET(5 MG) BY MOUTH EVERY DAY (Patient taking differently: Take 5 mg by mouth every morning. ) 90 tablet 3    atorvastatin (LIPITOR) 40 MG tablet TAKE 1 TABLET(40 MG) BY MOUTH EVERY DAY (Patient taking differently: Take 40 mg by mouth every evening. ) 90 tablet 1    betahistine HCl (BETAHISTINE, BULK, MISC) 12 mg by Misc.(Non-Drug; Combo Route) route every evening.      clonazePAM (KLONOPIN) 0.5 MG tablet Take 0.5 mg by mouth every evening.       levothyroxine (SYNTHROID) 50 MCG tablet TAKE 1 TABLET BY MOUTH EVERY DAY (Patient taking differently: Take 50 mcg by mouth before breakfast. TAKE 1 TABLET BY MOUTH EVERY DAY) 90 tablet 1    ondansetron (ZOFRAN) 4 MG tablet Take 4 mg  by mouth every 4 (four) hours as needed for Nausea.      spironolactone (ALDACTONE) 25 MG tablet Take 25 mg by mouth every morning.       traMADoL (ULTRAM) 50 mg tablet Take 1 tablet (50 mg total) by mouth every 6 (six) hours as needed for Pain. 60 tablet 0    escitalopram oxalate (LEXAPRO) 5 MG Tab TAKE 1 TABLET(5 MG) BY MOUTH EVERY DAY 30 tablet 11    estradiol (ESTRACE) 2 MG tablet TAKE 1 TABLET BY MOUTH ONCE DAILY 30 tablet 0    HYDROmorphone (DILAUDID) 2 MG tablet Take 1 tablet (2 mg total) by mouth every 3 (three) hours as needed for Pain. 120 tablet 0    lidocaine-prilocaine (EMLA) cream Apply topically as needed. 30 g 5       Past Surgical History:   Procedure Laterality Date    CARPAL TUNNEL RELEASE      right     COLONOSCOPY N/A 8/10/2018    Procedure: COLONOSCOPY;  Surgeon: Stiven Stoner MD;  Location: [facility] ([address] [place] [address]);  Service: Endoscopy;  Laterality: N/A;  generated from previous   pt requesting this date.    EAR MASTOIDECTOMY W/ COCHLEAR IMPLANT W/ LANDMARK      FOOT SURGERY      HYSTERECTOMY      in her 40's    tarsal tunnel      right foot     TONSILLECTOMY         Social History     Socioeconomic History    Marital status:      Spouse name: Not on file    Number of children: 2    Years of education: Not on file    Highest education level: Not on file   Occupational History    Occupation: ,      Comment: self-employed     Employer: [employer]   Social Needs    Financial resource strain: Not hard at all    Food insecurity:     Worry: Never true     Inability: Never true    Transportation needs:     Medical: No     Non-medical: No   Tobacco Use    Smoking status: Never Smoker    Smokeless tobacco: Never Used   Substance and Sexual Activity    Alcohol use: No     Frequency: Monthly or less     Drinks per session: 1 or 2     Binge frequency: Never    Drug use: No    Sexual activity: Yes     Birth control/protection: None   Lifestyle     Physical activity:     Days per week: 5 days     Minutes per session: 120 min    Stress: Only a little   Relationships    Social connections:     Talks on phone: More than three times a week     Gets together: Three times a week     Attends [employer] service: Not on file     Active member of club or organization: No     Attends meetings of clubs or organizations: Not on file     Relationship status:    Other Topics Concern    Are you pregnant or think you may be? No    Breast-feeding No   Social History Narrative    Exercise:  .      She has returned to the gym.        Son is engaged.       OBJECTIVE:     Vital Signs Range (Last 24H):  Temp:  [36.4 °C (97.6 °F)]   Pulse:  [90]   BP: (114)/(66)       Significant Labs:  Lab Results   Component Value Date    WBC 7.00 05/29/2020    HGB 11.9 (L) 05/29/2020    HCT 38.0 05/29/2020     05/29/2020    CHOL 200 (H) 08/06/2019    TRIG 150 08/06/2019    HDL 69 08/06/2019    ALT 44 05/29/2020    AST 34 05/29/2020     05/29/2020    K 3.3 (L) 05/29/2020     05/29/2020    CREATININE 1.1 05/29/2020    BUN 20 05/29/2020    CO2 26 05/29/2020    TSH 1.594 08/06/2019    HGBA1C 5.4 08/06/2019       Diagnostic Studies: No relevant studies.    EKG:   Results for orders placed or performed during the hospital encounter of 08/06/19   EKG 12-lead    Collection Time: 08/06/19  9:58 AM    Narrative    Test Reason : Z00.00,    Vent. Rate : 063 BPM     Atrial Rate : 063 BPM     P-R Int : 138 ms          QRS Dur : 080 ms      QT Int : 410 ms       P-R-T Axes : 000 -09 011 degrees     QTc Int : 419 ms    Normal sinus rhythm  Normal ECG  When compared with ECG of 18-SEP-2018 09:26,  Axis has shifted    Confirmed by ALEX GUILLEN MD ([id number]) on 8/6/2019 12:13:13 PM    Referred By: PATY TELLO           Confirmed By:ALEX GUILLEN MD       ECHOCARDIOGRAM:  TTE:  No results found for this or any previous visit.    ASSESSMENT/PLAN:         Anesthesia Evaluation    I  have reviewed the Patient Summary Reports.    I have reviewed the Nursing Notes. I have reviewed the NPO Status.      Review of Systems  Anesthesia Hx:  Neg history of prior surgery. Denies Family Hx of Anesthesia complications.  Personal Hx of Anesthesia complications, Post-Operative Nausea/Vomiting   Social:  Non-Smoker, No Alcohol Use    Hematology/Oncology:        Current/Recent Cancer. Other (see Oncology comments)   Hepatic/GI:   Bowel Prep.        Physical Exam  General:  Well nourished 30 pound weight loss since december   Airway/Jaw/Neck:  Airway Findings: Mouth Opening: Small, but > 3cm Tongue: Normal  General Airway Assessment: Possible difficult intubation  Mallampati: III  Improves to II with phonation.  TM Distance: Normal, at least 6 cm  Jaw/Neck Findings:  Neck ROM: Normal ROM      Dental:  Dental Findings: In tact    Chest/Lungs:  Chest/Lungs Findings: Normal Respiratory Rate     Heart/Vascular:  Heart Findings: Sounds: Normal        Mental Status:  Mental Status Findings:  Cooperative, Alert and Oriented         Anesthesia Plan  Type of Anesthesia, risks & benefits discussed:  Anesthesia Type:  general, MAC  Patient's Preference:   Intra-op Monitoring Plan: standard ASA monitors  Intra-op Monitoring Plan Comments:   Post Op Pain Control Plan: multimodal analgesia  Post Op Pain Control Plan Comments:   Induction:   IV  Beta Blocker:  Patient is not currently on a Beta-Blocker (No further documentation required).       Informed Consent: Patient understands risks and agrees with Anesthesia plan.  Questions answered. Anesthesia consent signed with patient.  ASA Score: 3     Day of Surgery Review of History & Physical:            Ready For Surgery From Anesthesia Perspective.     Patient is reported to have a low sensitivity to anesthesia and may require less than most people

## 2020-06-08 NOTE — PROGRESS NOTES
Patient ID: Mercedes Aviles is a 70 y.o. female.    Chief Complaint: Consult      HPI:  Mrs. Aviles is a 69 yo female with HTN and Meniere's disease with recently diagnosed unresectable pancreatic cancer. Seen by Dr. Singh, who plans to start chemotherapy as soon as possible. Not on blood thinners.    She has history of post-operative nausea and emesis and prolonged recovery from anesthesia for outpatient procedure (required overnight admission).      Review of Systems   Constitutional: Positive for unexpected weight change. Negative for chills and fever.   HENT: Negative.    Eyes: Negative.    Respiratory: Negative.  Negative for shortness of breath.    Cardiovascular: Negative.    Gastrointestinal: Positive for abdominal pain and constipation.   Endocrine: Negative.    Genitourinary: Negative.  Negative for dysuria.   Musculoskeletal: Negative.    Allergic/Immunologic: Negative.    Neurological: Negative.    Hematological: Negative.    Psychiatric/Behavioral: Negative.        Current Outpatient Medications   Medication Sig Dispense Refill    amLODIPine (NORVASC) 5 MG tablet TAKE 1 TABLET(5 MG) BY MOUTH EVERY DAY 90 tablet 3    atorvastatin (LIPITOR) 40 MG tablet TAKE 1 TABLET(40 MG) BY MOUTH EVERY DAY 90 tablet 1    biotin 5 mg Cap Take by mouth.      cholecalciferol, vitamin D3, 2,000 unit Cap Take by mouth.      clonazePAM (KLONOPIN) 0.5 MG tablet Take 0.5 mg by mouth once daily.      COMPOUND HORMONE REPLACEMENT Take by mouth once daily. Testosterone 5mg  E4M #30  1 daily 5 refills 30 capsule 5    cyanocobalamin (VITAMIN B-12) 100 MCG tablet Take 100 mcg by mouth once daily.      escitalopram oxalate (LEXAPRO) 5 MG Tab TAKE 1 TABLET(5 MG) BY MOUTH EVERY DAY 30 tablet 11    estradiol (ESTRACE) 2 MG tablet TAKE 1 TABLET BY MOUTH ONCE DAILY 30 tablet 0    HYDROmorphone (DILAUDID) 2 MG tablet Take 1 tablet (2 mg total) by mouth every 3 (three) hours as needed for Pain. 120 tablet 0    levothyroxine  (SYNTHROID) 50 MCG tablet TAKE 1 TABLET BY MOUTH EVERY DAY 90 tablet 1    lidocaine-prilocaine (EMLA) cream Apply topically as needed. 30 g 5    magnesium oxide (MAG-OX) 400 mg tablet Take 400 mg by mouth once daily.      omeprazole (PRILOSEC) 20 MG capsule Take 20 mg by mouth once daily.      ondansetron (ZOFRAN) 4 MG tablet Take 4 mg by mouth every 4 (four) hours as needed for Nausea.      spironolactone (ALDACTONE) 25 MG tablet Take 25 mg by mouth once daily.      traMADoL (ULTRAM) 50 mg tablet Take 1 tablet (50 mg total) by mouth every 6 (six) hours as needed for Pain. 60 tablet 0    vitamin E 100 UNIT capsule Take by mouth.       No current facility-administered medications for this visit.        Review of patient's allergies indicates:   Allergen Reactions    Celebrex [celecoxib] Anxiety     Elevated BP    Topiramate Anaphylaxis    Simvastatin      Other reaction(s): Rash    Latex Rash       Past Medical History:   Diagnosis Date    Hyperlipidemia     Hypertension     Meniere disease     Psoriasis        Past Surgical History:   Procedure Laterality Date    CARPAL TUNNEL RELEASE      right     COLONOSCOPY N/A 8/10/2018    Procedure: COLONOSCOPY;  Surgeon: Stiven Stoner MD;  Location: [facility] ([address] [place] [address]);  Service: Endoscopy;  Laterality: N/A;  generated from previous   pt requesting this date.    EAR MASTOIDECTOMY W/ COCHLEAR IMPLANT W/ LANDMARK      FOOT SURGERY      HYSTERECTOMY      in her 40's    tarsal tunnel      right foot     TONSILLECTOMY         Family History   Problem Relation Age of Onset    Psoriasis Maternal Grandmother     Hypertension Mother     Dementia Mother     Hypertension Sister     Hyperlipidemia Sister     Cancer Sister         uterus/cx    No Known Problems Son     Hyperlipidemia Daughter     Arthritis Daughter     Breast cancer Neg Hx     Ovarian cancer Neg Hx        Social History     Socioeconomic History    Marital status:       Spouse name: Not on file    Number of children: 2    Years of education: Not on file    Highest education level: Not on file   Occupational History    Occupation: [employer]     Comment: self-employed     Employer: [employer]   Social Needs    Financial resource strain: Not hard at all    Food insecurity:     Worry: Never true     Inability: Never true    Transportation needs:     Medical: No     Non-medical: No   Tobacco Use    Smoking status: Never Smoker    Smokeless tobacco: Never Used   Substance and Sexual Activity    Alcohol use: No     Frequency: Monthly or less     Drinks per session: 1 or 2     Binge frequency: Never    Drug use: No    Sexual activity: Yes     Birth control/protection: None   Lifestyle    Physical activity:     Days per week: 5 days     Minutes per session: 120 min    Stress: Only a little   Relationships    Social connections:     Talks on phone: More than three times a week     Gets together: Three times a week     Attends [employer] service: Not on file     Active member of club or organization: No     Attends meetings of clubs or organizations: Not on file     Relationship status:    Other Topics Concern    Are you pregnant or think you may be? No    Breast-feeding No   Social History Narrative    Exercise:  .      She has returned to the gym.        Son is engaged.       Vitals:    06/08/20 0814   BP: 114/66   Pulse: 90   Temp: 97.6 °F (36.4 °C)       Physical Exam   Constitutional: She is oriented to person, place, and time. She appears well-developed and well-nourished.   Cardiovascular: Normal rate and regular rhythm.   Pulmonary/Chest: Effort normal. No respiratory distress.   Abdominal: Soft. She exhibits no distension and no mass.   Neurological: She is alert and oriented to person, place, and time.   Skin: Skin is warm and dry.   Psychiatric: She has a normal mood and affect.   Vitals reviewed.      Assessment & Plan:   70F with  unresectable pancreatic cancer due to start chemo ASAP. No blood thinners. No prior neck surgeries/lines.    Consent obtained in clinic  OR this week for port placement    SHERRON Michaels MD   General Surgery, PGY-2  Pager: [phone]

## 2020-06-08 NOTE — H&P (VIEW-ONLY)
Patient ID: Mercedes Aviles is a 70 y.o. female.    Chief Complaint: Consult      HPI:  Mrs. Aviels is a 71 yo female with HTN and Meniere's disease with recently diagnosed unresectable pancreatic cancer. Seen by Dr. Singh, who plans to start chemotherapy as soon as possible. Not on blood thinners.    She has history of post-operative nausea and emesis and prolonged recovery from anesthesia for outpatient procedure (required overnight admission).      Review of Systems   Constitutional: Positive for unexpected weight change. Negative for chills and fever.   HENT: Negative.    Eyes: Negative.    Respiratory: Negative.  Negative for shortness of breath.    Cardiovascular: Negative.    Gastrointestinal: Positive for abdominal pain and constipation.   Endocrine: Negative.    Genitourinary: Negative.  Negative for dysuria.   Musculoskeletal: Negative.    Allergic/Immunologic: Negative.    Neurological: Negative.    Hematological: Negative.    Psychiatric/Behavioral: Negative.        Current Outpatient Medications   Medication Sig Dispense Refill    amLODIPine (NORVASC) 5 MG tablet TAKE 1 TABLET(5 MG) BY MOUTH EVERY DAY 90 tablet 3    atorvastatin (LIPITOR) 40 MG tablet TAKE 1 TABLET(40 MG) BY MOUTH EVERY DAY 90 tablet 1    biotin 5 mg Cap Take by mouth.      cholecalciferol, vitamin D3, 2,000 unit Cap Take by mouth.      clonazePAM (KLONOPIN) 0.5 MG tablet Take 0.5 mg by mouth once daily.      COMPOUND HORMONE REPLACEMENT Take by mouth once daily. Testosterone 5mg  E4M #30  1 daily 5 refills 30 capsule 5    cyanocobalamin (VITAMIN B-12) 100 MCG tablet Take 100 mcg by mouth once daily.      escitalopram oxalate (LEXAPRO) 5 MG Tab TAKE 1 TABLET(5 MG) BY MOUTH EVERY DAY 30 tablet 11    estradiol (ESTRACE) 2 MG tablet TAKE 1 TABLET BY MOUTH ONCE DAILY 30 tablet 0    HYDROmorphone (DILAUDID) 2 MG tablet Take 1 tablet (2 mg total) by mouth every 3 (three) hours as needed for Pain. 120 tablet 0    levothyroxine  (SYNTHROID) 50 MCG tablet TAKE 1 TABLET BY MOUTH EVERY DAY 90 tablet 1    lidocaine-prilocaine (EMLA) cream Apply topically as needed. 30 g 5    magnesium oxide (MAG-OX) 400 mg tablet Take 400 mg by mouth once daily.      omeprazole (PRILOSEC) 20 MG capsule Take 20 mg by mouth once daily.      ondansetron (ZOFRAN) 4 MG tablet Take 4 mg by mouth every 4 (four) hours as needed for Nausea.      spironolactone (ALDACTONE) 25 MG tablet Take 25 mg by mouth once daily.      traMADoL (ULTRAM) 50 mg tablet Take 1 tablet (50 mg total) by mouth every 6 (six) hours as needed for Pain. 60 tablet 0    vitamin E 100 UNIT capsule Take by mouth.       No current facility-administered medications for this visit.        Review of patient's allergies indicates:   Allergen Reactions    Celebrex [celecoxib] Anxiety     Elevated BP    Topiramate Anaphylaxis    Simvastatin      Other reaction(s): Rash    Latex Rash       Past Medical History:   Diagnosis Date    Hyperlipidemia     Hypertension     Meniere disease     Psoriasis        Past Surgical History:   Procedure Laterality Date    CARPAL TUNNEL RELEASE      right     COLONOSCOPY N/A 8/10/2018    Procedure: COLONOSCOPY;  Surgeon: Stiven Stoner MD;  Location: [facility] ([address] [place] [address]);  Service: Endoscopy;  Laterality: N/A;  generated from previous   pt requesting this date.    EAR MASTOIDECTOMY W/ COCHLEAR IMPLANT W/ LANDMARK      FOOT SURGERY      HYSTERECTOMY      in her 40's    tarsal tunnel      right foot     TONSILLECTOMY         Family History   Problem Relation Age of Onset    Psoriasis Maternal Grandmother     Hypertension Mother     Dementia Mother     Hypertension Sister     Hyperlipidemia Sister     Cancer Sister         uterus/cx    No Known Problems Son     Hyperlipidemia Daughter     Arthritis Daughter     Breast cancer Neg Hx     Ovarian cancer Neg Hx        Social History     Socioeconomic History    Marital status:       Spouse name: Not on file    Number of children: 2    Years of education: Not on file    Highest education level: Not on file   Occupational History    Occupation: [employer]     Comment: self-employed     Employer: [employer]   Social Needs    Financial resource strain: Not hard at all    Food insecurity:     Worry: Never true     Inability: Never true    Transportation needs:     Medical: No     Non-medical: No   Tobacco Use    Smoking status: Never Smoker    Smokeless tobacco: Never Used   Substance and Sexual Activity    Alcohol use: No     Frequency: Monthly or less     Drinks per session: 1 or 2     Binge frequency: Never    Drug use: No    Sexual activity: Yes     Birth control/protection: None   Lifestyle    Physical activity:     Days per week: 5 days     Minutes per session: 120 min    Stress: Only a little   Relationships    Social connections:     Talks on phone: More than three times a week     Gets together: Three times a week     Attends [employer] service: Not on file     Active member of club or organization: No     Attends meetings of clubs or organizations: Not on file     Relationship status:    Other Topics Concern    Are you pregnant or think you may be? No    Breast-feeding No   Social History Narrative    Exercise:  .      She has returned to the gym.        Son is engaged.       Vitals:    06/08/20 0814   BP: 114/66   Pulse: 90   Temp: 97.6 °F (36.4 °C)       Physical Exam   Constitutional: She is oriented to person, place, and time. She appears well-developed and well-nourished.   Cardiovascular: Normal rate and regular rhythm.   Pulmonary/Chest: Effort normal. No respiratory distress.   Abdominal: Soft. She exhibits no distension and no mass.   Neurological: She is alert and oriented to person, place, and time.   Skin: Skin is warm and dry.   Psychiatric: She has a normal mood and affect.   Vitals reviewed.      Assessment & Plan:   70F with  unresectable pancreatic cancer due to start chemo ASAP. No blood thinners. No prior neck surgeries/lines.    Consent obtained in clinic  OR this week for port placement    SHERRON Michaels MD   General Surgery, PGY-2  Pager: [phone]

## 2020-06-08 NOTE — PRE-PROCEDURE INSTRUCTIONS
PREOP INSTRUCTIONS:No solid food ,milk or milk products for 8 hours prior to procedure.Clear liquids are allowed up to 2 hours before procedure.Clear liquids are:water,apple juice,gatorade & powerade.Shower instructions as well as directions to the Surgery Center were given.Patient's  encouraged to have patient wear loose fitting,comfortable clothing.Medication instructions for pm prior to and am of procedure reviewed.Instructed patient's  to have patient avoid taking vitamins,supplements,aspirin and ibuprofen the morning of surgery. Patient's  stated an understanding.Patient's  instructed to have patient take temperature the night before surgery as well as the morning of surgery and to notify [facility] at [phone] if it is 100.4 or above.Patient's  also informed of the current visitor policy and advised patient's  that one visitor may accompany them into the hospital and wait (socially distanced) until a member of the medical team provides an update at the conclusion of the procedure.When they enter the hospital both patient and visitor will have their temperature checked.All visitors are asked to arrive with a mask and to keep their mask on throughout the visit.      Covid screening completed 6/8/2020 with results pending    Patient's  denies patient having any side effects or issues with anesthesia or sedation other than PONV.Patient is reported to be very sensitive to anesthesia and may require smaller amounts than most people

## 2020-06-09 ENCOUNTER — ANESTHESIA (OUTPATIENT)
Dept: SURGERY | Facility: HOSPITAL | Age: 70
End: 2020-06-09
Payer: COMMERCIAL

## 2020-06-09 ENCOUNTER — HOSPITAL ENCOUNTER (OUTPATIENT)
Facility: HOSPITAL | Age: 70
Discharge: HOME | End: 2020-06-09
Attending: SURGERY | Admitting: SURGERY
Payer: COMMERCIAL

## 2020-06-09 VITALS
RESPIRATION RATE: 14 BRPM | BODY MASS INDEX: 21.53 KG/M2 | TEMPERATURE: 98 F | HEART RATE: 81 BPM | DIASTOLIC BLOOD PRESSURE: 57 MMHG | HEIGHT: 62 IN | SYSTOLIC BLOOD PRESSURE: 108 MMHG | OXYGEN SATURATION: 96 % | WEIGHT: 117 LBS

## 2020-06-09 DIAGNOSIS — C25.9 CANCER OF PANCREAS: ICD-10-CM

## 2020-06-09 PROCEDURE — C1788 PORT, INDWELLING, IMP: HCPCS | Performed by: SURGERY

## 2020-06-09 PROCEDURE — D9220A PRA ANESTHESIA: ICD-10-PCS | Mod: ,,, | Performed by: ANESTHESIOLOGY

## 2020-06-09 PROCEDURE — 71000033 HC RECOVERY, INTIAL HOUR: Performed by: SURGERY

## 2020-06-09 PROCEDURE — 36561 PR INSERT TUNNELED CV CATH WITH PORT: ICD-10-PCS | Mod: RT,,, | Performed by: SURGERY

## 2020-06-09 PROCEDURE — 25000003 PHARM REV CODE 250: Performed by: SURGERY

## 2020-06-09 PROCEDURE — 63600175 PHARM REV CODE 636 W HCPCS: Performed by: SURGERY

## 2020-06-09 PROCEDURE — S0020 INJECTION, BUPIVICAINE HYDRO: HCPCS | Performed by: SURGERY

## 2020-06-09 PROCEDURE — 94761 N-INVAS EAR/PLS OXIMETRY MLT: CPT

## 2020-06-09 PROCEDURE — 25000003 PHARM REV CODE 250: Performed by: ANESTHESIOLOGY

## 2020-06-09 PROCEDURE — 63600175 PHARM REV CODE 636 W HCPCS: Performed by: STUDENT IN AN ORGANIZED HEALTH CARE EDUCATION/TRAINING PROGRAM

## 2020-06-09 PROCEDURE — 37000009 HC ANESTHESIA EA ADD 15 MINS: Performed by: SURGERY

## 2020-06-09 PROCEDURE — 25000003 PHARM REV CODE 250: Performed by: STUDENT IN AN ORGANIZED HEALTH CARE EDUCATION/TRAINING PROGRAM

## 2020-06-09 PROCEDURE — 37000008 HC ANESTHESIA 1ST 15 MINUTES: Performed by: SURGERY

## 2020-06-09 PROCEDURE — 71000015 HC POSTOP RECOV 1ST HR: Performed by: SURGERY

## 2020-06-09 PROCEDURE — D9220A PRA ANESTHESIA: Mod: ,,, | Performed by: ANESTHESIOLOGY

## 2020-06-09 PROCEDURE — 36000706: Performed by: SURGERY

## 2020-06-09 PROCEDURE — 36561 INSERT TUNNELED CV CATH: CPT | Mod: RT,,, | Performed by: SURGERY

## 2020-06-09 PROCEDURE — 63600175 PHARM REV CODE 636 W HCPCS: Performed by: GENERAL PRACTICE

## 2020-06-09 PROCEDURE — 36000707: Performed by: SURGERY

## 2020-06-09 PROCEDURE — 77001 FLUOROGUIDE FOR VEIN DEVICE: CPT | Mod: 26,,, | Performed by: SURGERY

## 2020-06-09 PROCEDURE — 77001 CHG FLUOROGUIDE CNTRL VEN ACCESS,PLACE,REPLACE,REMOVE: ICD-10-PCS | Mod: 26,,, | Performed by: SURGERY

## 2020-06-09 DEVICE — KIT POWERPORT SINGLE 8FR: Type: IMPLANTABLE DEVICE | Site: NECK | Status: FUNCTIONAL

## 2020-06-09 RX ORDER — CEFAZOLIN SODIUM 1 G/3ML
2 INJECTION, POWDER, FOR SOLUTION INTRAMUSCULAR; INTRAVENOUS
Status: COMPLETED | OUTPATIENT
Start: 2020-06-09 | End: 2020-06-09

## 2020-06-09 RX ORDER — PROPOFOL 10 MG/ML
VIAL (ML) INTRAVENOUS CONTINUOUS PRN
Status: DISCONTINUED | OUTPATIENT
Start: 2020-06-09 | End: 2020-06-09

## 2020-06-09 RX ORDER — HEPARIN SODIUM (PORCINE) LOCK FLUSH IV SOLN 100 UNIT/ML 100 UNIT/ML
SOLUTION INTRAVENOUS
Status: DISCONTINUED | OUTPATIENT
Start: 2020-06-09 | End: 2020-06-09 | Stop reason: HOSPADM

## 2020-06-09 RX ORDER — SODIUM CHLORIDE 0.9 % (FLUSH) 0.9 %
10 SYRINGE (ML) INJECTION
Status: DISCONTINUED | OUTPATIENT
Start: 2020-06-09 | End: 2020-06-09 | Stop reason: HOSPADM

## 2020-06-09 RX ORDER — ONDANSETRON 8 MG/1
8 TABLET, ORALLY DISINTEGRATING ORAL ONCE
Status: COMPLETED | OUTPATIENT
Start: 2020-06-09 | End: 2020-06-09

## 2020-06-09 RX ORDER — BUPIVACAINE HYDROCHLORIDE 5 MG/ML
INJECTION, SOLUTION EPIDURAL; INTRACAUDAL
Status: DISCONTINUED | OUTPATIENT
Start: 2020-06-09 | End: 2020-06-09 | Stop reason: HOSPADM

## 2020-06-09 RX ORDER — MIDAZOLAM HYDROCHLORIDE 1 MG/ML
INJECTION, SOLUTION INTRAMUSCULAR; INTRAVENOUS
Status: DISCONTINUED | OUTPATIENT
Start: 2020-06-09 | End: 2020-06-09

## 2020-06-09 RX ORDER — ACETAMINOPHEN 500 MG
1000 TABLET ORAL
Status: COMPLETED | OUTPATIENT
Start: 2020-06-09 | End: 2020-06-09

## 2020-06-09 RX ORDER — FENTANYL CITRATE 50 UG/ML
INJECTION, SOLUTION INTRAMUSCULAR; INTRAVENOUS
Status: DISCONTINUED | OUTPATIENT
Start: 2020-06-09 | End: 2020-06-09

## 2020-06-09 RX ORDER — KETOROLAC TROMETHAMINE 30 MG/ML
15 INJECTION, SOLUTION INTRAMUSCULAR; INTRAVENOUS ONCE
Status: COMPLETED | OUTPATIENT
Start: 2020-06-09 | End: 2020-06-09

## 2020-06-09 RX ORDER — LIDOCAINE HYDROCHLORIDE 10 MG/ML
INJECTION, SOLUTION EPIDURAL; INFILTRATION; INTRACAUDAL; PERINEURAL
Status: DISCONTINUED | OUTPATIENT
Start: 2020-06-09 | End: 2020-06-09 | Stop reason: HOSPADM

## 2020-06-09 RX ORDER — ONDANSETRON 8 MG/1
8 TABLET, ORALLY DISINTEGRATING ORAL EVERY 8 HOURS PRN
Qty: 20 TABLET | Refills: 0 | Status: SHIPPED | OUTPATIENT
Start: 2020-06-09 | End: 2020-06-16

## 2020-06-09 RX ORDER — SODIUM CHLORIDE 9 MG/ML
INJECTION, SOLUTION INTRAVENOUS CONTINUOUS PRN
Status: DISCONTINUED | OUTPATIENT
Start: 2020-06-09 | End: 2020-06-09

## 2020-06-09 RX ORDER — ACETAMINOPHEN 500 MG
1000 TABLET ORAL ONCE
Status: COMPLETED | OUTPATIENT
Start: 2020-06-09 | End: 2020-06-09

## 2020-06-09 RX ORDER — ONDANSETRON 4 MG/1
4 TABLET, ORALLY DISINTEGRATING ORAL ONCE AS NEEDED
Status: COMPLETED | OUTPATIENT
Start: 2020-06-09 | End: 2020-06-09

## 2020-06-09 RX ORDER — OXYCODONE HYDROCHLORIDE 5 MG/1
5 TABLET ORAL ONCE AS NEEDED
Status: COMPLETED | OUTPATIENT
Start: 2020-06-09 | End: 2020-06-09

## 2020-06-09 RX ORDER — ONDANSETRON 2 MG/ML
INJECTION INTRAMUSCULAR; INTRAVENOUS
Status: DISCONTINUED | OUTPATIENT
Start: 2020-06-09 | End: 2020-06-09

## 2020-06-09 RX ADMIN — ACETAMINOPHEN 1000 MG: 500 TABLET ORAL at 09:06

## 2020-06-09 RX ADMIN — MIDAZOLAM HYDROCHLORIDE 2 MG: 1 INJECTION, SOLUTION INTRAMUSCULAR; INTRAVENOUS at 10:06

## 2020-06-09 RX ADMIN — PROPOFOL 50 MCG/KG/MIN: 10 INJECTION, EMULSION INTRAVENOUS at 11:06

## 2020-06-09 RX ADMIN — OXYCODONE HYDROCHLORIDE 5 MG: 5 TABLET ORAL at 12:06

## 2020-06-09 RX ADMIN — ONDANSETRON 4 MG: 2 INJECTION, SOLUTION INTRAMUSCULAR; INTRAVENOUS at 11:06

## 2020-06-09 RX ADMIN — SODIUM CHLORIDE: 0.9 INJECTION, SOLUTION INTRAVENOUS at 10:06

## 2020-06-09 RX ADMIN — FENTANYL CITRATE 25 MCG: 50 INJECTION, SOLUTION INTRAMUSCULAR; INTRAVENOUS at 11:06

## 2020-06-09 RX ADMIN — ONDANSETRON 4 MG: 4 TABLET, ORALLY DISINTEGRATING ORAL at 01:06

## 2020-06-09 RX ADMIN — ONDANSETRON 8 MG: 8 TABLET, ORALLY DISINTEGRATING ORAL at 10:06

## 2020-06-09 RX ADMIN — FENTANYL CITRATE 50 MCG: 50 INJECTION, SOLUTION INTRAMUSCULAR; INTRAVENOUS at 11:06

## 2020-06-09 RX ADMIN — ACETAMINOPHEN 1000 MG: 500 TABLET ORAL at 12:06

## 2020-06-09 RX ADMIN — CEFAZOLIN 2 G: 330 INJECTION, POWDER, FOR SOLUTION INTRAMUSCULAR; INTRAVENOUS at 11:06

## 2020-06-09 RX ADMIN — KETOROLAC TROMETHAMINE 15 MG: 30 INJECTION, SOLUTION INTRAMUSCULAR at 12:06

## 2020-06-09 NOTE — INTERVAL H&P NOTE
The patient has been examined and the H&P has been reviewed:    I concur with the findings and no changes have occurred since H&P was written.    Anesthesia/Surgery risks, benefits and alternative options discussed and understood by patient/family.      Active Hospital Problems    Diagnosis  POA    *Cancer of pancreas [C25.9]  Yes      Resolved Hospital Problems   No resolved problems to display.

## 2020-06-09 NOTE — TRANSFER OF CARE
"Anesthesia Transfer of Care Note    Patient: Mercedes Aviles    Procedure(s) Performed: Procedure(s) (LRB):  [id number], RIGHT POSS LEFT (Right)    Patient location: PACU    Anesthesia Type: general    Transport from OR: Transported from OR on room air with adequate spontaneous ventilation    Post pain: adequate analgesia    Post assessment: no apparent anesthetic complications    Post vital signs: stable    Level of consciousness: sedated    Nausea/Vomiting: no nausea/vomiting    Complications: none    Transfer of care protocol was followed      Last vitals:   Visit Vitals  BP 96/61   Pulse 91   Temp 36.6 °C (97.8 °F) (Temporal)   Resp 18   Ht 5' 2" (1.575 m)   Wt 53.1 kg (117 lb)   LMP 08/08/2012   SpO2 96%   Breastfeeding? No   BMI 21.40 kg/m²     "

## 2020-06-09 NOTE — DISCHARGE SUMMARY
[facility]  Brief Operative Note    Surgery Date: 6/9/2020     Surgeon(s) and Role:     * Raleigh Dye MD - Primary     * Ap Thompson MD - Resident - Chief    Assisting Surgeon: None    Pre-op Diagnosis:  Malignant neoplasm of head of pancreas [C25.0]    Post-op Diagnosis:  Post-Op Diagnosis Codes:     * Malignant neoplasm of head of pancreas [C25.0]    Procedure(s) (LRB):  [id number], RIGHT POSS LEFT (Right)    Anesthesia: Local MAC    Description of the findings of the procedure(s): Right IJ anatomy normal. Port in place on fluoro. Flushes easily. No complications.     Estimated Blood Loss: * No values recorded between 6/9/2020 11:13 AM and 6/9/2020 11:47 AM *         Specimens:   Specimen (12h ago, onward)    None            Discharge Note    OUTCOME: Patient tolerated treatment/procedure well without complication and is now ready for discharge.    DISPOSITION: Home or Self Care    FINAL DIAGNOSIS:  Cancer of pancreas    FOLLOWUP: In clinic    DISCHARGE INSTRUCTIONS:    Discharge Procedure Orders   Diet Adult Regular     No driving until:   Order Comments: No driving while on narcotic medications.     Call MD for:  temperature >100.4     Call MD for:  persistent nausea and vomiting or diarrhea     Call MD for:  severe uncontrolled pain     Call MD for:  redness, tenderness, or signs of infection (pain, swelling, redness, odor or green/yellow discharge around incision site)     Call MD for:  difficulty breathing or increased cough     Call MD for:  severe persistent headache     Call MD for:  worsening rash     Call MD for:  persistent dizziness, light-headedness, or visual disturbances     Call MD for:  increased confusion or weakness     No dressing needed     Shower on day dressing removed (No bath)   Order Comments: Do not submerge wound for two weeks, no baths, pools, lakes, etc. Only showers.  OK to shower post-op day one

## 2020-06-09 NOTE — OP NOTE
DATE OF PROCEDURE: 06/09/2020    POSTOPERATIVE DIAGNOSIS: Unresectable pancreatic cancer    POSTOPERATIVE DIAGNOSIS:  Same    PROCEDURES PERFORMED: Right IJ port-a-cath placement    STAFF SURGEON: Raleigh Dye MD    SURGEON:  Genaro Thompson    FINDINGS: Normal anatomy, easily placed, right IJ port on fluoro     ANESTHESIA:  Local MAC.    COMPLICATIONS:  None.    ESTIMATED BLOOD LOSS:  Less than 5 mL.    INDICATIONS FOR PROCEDURE:  Mercedes Aviles is a 70 y.o. female with advanced non resectable pancreatic cancer. She was referred to our clinic for port-a-cath placement for chemotherapy to which she agreed.     PROCEDURE IN DETAIL:  Once consent was reviewed and a timeout was done in the   Operating Room, the patient was positioned in a supine position with a shoulder   roll placed behind her two scapulae. The patient's right chest was then prepped and draped in the usual   surgical sterile fashion.  The right neck and chest were then infiltrated with lidocaine.  The   introducer needle was used to cannulate the internal jugular vein under ultrasound guidance.  This was   done with 1 attempt without any difficulty.  Upon   easy withdrawal of blood the wire was advanced into the needle in the   Internal jugular vein.  This was visualized under fluoro to be past the diaphragm  In the IVC.  At this time, the needle was removed and the wire was   left in place. 2 finger breadths below the clavicle on the right a 5cm sharp incision was made and port pocket was created   after local infiltration of the area.  A small 0.5 cm thick flap was raised and   pocket was created, port fit into pocket well.  The port was sutured in   place at 3 points using a Vicryl stitch. The catheter was then tunneled subcutaneously up to the neck site where the wire was still in place. At this point under fluoroscopy the   dilator and peel-away sheath were advanced over the wire   under fluoroscopy into the internal jugular vein.  At   that  point, the wire/dilator apparatus were removed and the catheter, which was measured at 18   cm was advanced into the peel-away sheath.  The peel-away sheath was then   peeled away and the port was flushed with injectable saline without difficulty after aspiration of blood    as well as a Hep-Lock with about 4 mL of heparin.     At this point, the overlying skin was closed using 5 interrupted Vicryl stitches   followed by a running subcuticular 4-0 Monocryl stitch as well as a stitch to   close the needle stick site.  This was then dressed using dermabond.  The patient was awakened from anesthesia   without any complications or difficulty and transferred to Recovery Room in   stable condition and postoperative chest x-ray will be done to confirm placement   of the port, and the patient will be discharged home when she meets Recovery   Room criteria.

## 2020-06-09 NOTE — ANESTHESIA POSTPROCEDURE EVALUATION
Anesthesia Post Evaluation    Patient: Mercedes Aviles    Procedure(s) Performed: Procedure(s) (LRB):  [id number], RIGHT POSS LEFT (Right)    Final Anesthesia Type: general    Patient location during evaluation: PACU  Patient participation: Yes- Able to Participate  Level of consciousness: awake and alert and oriented  Post-procedure vital signs: reviewed and stable  Pain management: adequate  Airway patency: patent    PONV status at discharge: No PONV  Anesthetic complications: no      Cardiovascular status: hemodynamically stable  Respiratory status: unassisted and spontaneous ventilation  Hydration status: euvolemic  Follow-up not needed.          Vitals Value Taken Time   /65 6/9/2020 12:46 PM   Temp 36.6 °C (97.8 °F) 6/9/2020 11:50 AM   Pulse 75 6/9/2020 12:46 PM   Resp 15 6/9/2020 12:46 PM   SpO2 97 % 6/9/2020 12:46 PM   Vitals shown include unvalidated device data.      No case tracking events are documented in the log.      Pain/Troy Score: Pain Rating Prior to Med Admin: 4 (6/9/2020 12:44 PM)  Troy Score: 9 (6/9/2020 11:50 AM)

## 2020-06-10 ENCOUNTER — PATIENT MESSAGE (OUTPATIENT)
Dept: HEMATOLOGY/ONCOLOGY | Facility: CLINIC | Age: 70
End: 2020-06-10

## 2020-06-10 DIAGNOSIS — C25.0 MALIGNANT NEOPLASM OF HEAD OF PANCREAS: Primary | ICD-10-CM

## 2020-06-10 RX ORDER — DICYCLOMINE HYDROCHLORIDE 10 MG/1
10 CAPSULE ORAL 4 TIMES DAILY
Qty: 120 CAPSULE | Refills: 0 | Status: SHIPPED | OUTPATIENT
Start: 2020-06-10 | End: 2020-07-02

## 2020-06-13 ENCOUNTER — PATIENT MESSAGE (OUTPATIENT)
Dept: HEMATOLOGY/ONCOLOGY | Facility: CLINIC | Age: 70
End: 2020-06-13

## 2020-06-15 ENCOUNTER — LAB VISIT (OUTPATIENT)
Dept: FAMILY MEDICINE | Facility: CLINIC | Age: 70
End: 2020-06-15
Payer: COMMERCIAL

## 2020-06-15 ENCOUNTER — LAB VISIT (OUTPATIENT)
Dept: LAB | Facility: HOSPITAL | Age: 70
End: 2020-06-15
Attending: INTERNAL MEDICINE
Payer: COMMERCIAL

## 2020-06-15 DIAGNOSIS — C25.0 MALIGNANT NEOPLASM OF HEAD OF PANCREAS: ICD-10-CM

## 2020-06-15 LAB
ALBUMIN SERPL BCP-MCNC: 2.9 G/DL (ref 3.5–5.2)
ALP SERPL-CCNC: 190 U/L (ref 55–135)
ALT SERPL W/O P-5'-P-CCNC: 18 U/L (ref 10–44)
ANION GAP SERPL CALC-SCNC: 14 MMOL/L (ref 8–16)
AST SERPL-CCNC: 35 U/L (ref 10–40)
BILIRUB SERPL-MCNC: 1.6 MG/DL (ref 0.1–1)
BUN SERPL-MCNC: 29 MG/DL (ref 8–23)
CALCIUM SERPL-MCNC: 9.7 MG/DL (ref 8.7–10.5)
CHLORIDE SERPL-SCNC: 100 MMOL/L (ref 95–110)
CO2 SERPL-SCNC: 25 MMOL/L (ref 23–29)
CREAT SERPL-MCNC: 1.1 MG/DL (ref 0.5–1.4)
ERYTHROCYTE [DISTWIDTH] IN BLOOD BY AUTOMATED COUNT: 15.1 % (ref 11.5–14.5)
EST. GFR  (AFRICAN AMERICAN): 60 ML/MIN/1.73 M^2
EST. GFR  (NON AFRICAN AMERICAN): 52 ML/MIN/1.73 M^2
GLUCOSE SERPL-MCNC: 101 MG/DL (ref 70–110)
HCT VFR BLD AUTO: 38.4 % (ref 37–48.5)
HGB BLD-MCNC: 12.2 G/DL (ref 12–16)
IMM GRANULOCYTES # BLD AUTO: 0.02 K/UL (ref 0–0.04)
MCH RBC QN AUTO: 30.9 PG (ref 27–31)
MCHC RBC AUTO-ENTMCNC: 31.8 G/DL (ref 32–36)
MCV RBC AUTO: 97 FL (ref 82–98)
NEUTROPHILS # BLD AUTO: 4.4 K/UL (ref 1.8–7.7)
PLATELET # BLD AUTO: 246 K/UL (ref 150–350)
PMV BLD AUTO: 11 FL (ref 9.2–12.9)
POTASSIUM SERPL-SCNC: 3.5 MMOL/L (ref 3.5–5.1)
PROT SERPL-MCNC: 6.6 G/DL (ref 6–8.4)
RBC # BLD AUTO: 3.95 M/UL (ref 4–5.4)
SODIUM SERPL-SCNC: 139 MMOL/L (ref 136–145)
WBC # BLD AUTO: 6.73 K/UL (ref 3.9–12.7)

## 2020-06-15 PROCEDURE — 36415 COLL VENOUS BLD VENIPUNCTURE: CPT | Mod: PO

## 2020-06-15 PROCEDURE — U0003 INFECTIOUS AGENT DETECTION BY NUCLEIC ACID (DNA OR RNA); SEVERE ACUTE RESPIRATORY SYNDROME CORONAVIRUS 2 (SARS-COV-2) (CORONAVIRUS DISEASE [COVID-19]), AMPLIFIED PROBE TECHNIQUE, MAKING USE OF HIGH THROUGHPUT TECHNOLOGIES AS DESCRIBED BY CMS-2020-01-R: HCPCS

## 2020-06-15 PROCEDURE — 80053 COMPREHEN METABOLIC PANEL: CPT

## 2020-06-15 PROCEDURE — 85027 COMPLETE CBC AUTOMATED: CPT

## 2020-06-16 ENCOUNTER — TELEPHONE (OUTPATIENT)
Dept: GASTROENTEROLOGY | Facility: HOSPITAL | Age: 70
End: 2020-06-16

## 2020-06-16 ENCOUNTER — OFFICE VISIT (OUTPATIENT)
Dept: HEMATOLOGY/ONCOLOGY | Facility: CLINIC | Age: 70
End: 2020-06-16
Payer: COMMERCIAL

## 2020-06-16 VITALS
TEMPERATURE: 98 F | BODY MASS INDEX: 21.14 KG/M2 | HEIGHT: 62 IN | WEIGHT: 114.88 LBS | SYSTOLIC BLOOD PRESSURE: 112 MMHG | DIASTOLIC BLOOD PRESSURE: 85 MMHG | HEART RATE: 111 BPM

## 2020-06-16 DIAGNOSIS — R11.0 NAUSEA: ICD-10-CM

## 2020-06-16 DIAGNOSIS — G89.3 NEOPLASM RELATED PAIN: ICD-10-CM

## 2020-06-16 DIAGNOSIS — R63.0 ANOREXIA: ICD-10-CM

## 2020-06-16 DIAGNOSIS — C25.0 MALIGNANT NEOPLASM OF HEAD OF PANCREAS: Primary | ICD-10-CM

## 2020-06-16 DIAGNOSIS — K86.89 PANCREATIC INSUFFICIENCY: ICD-10-CM

## 2020-06-16 PROCEDURE — 99215 OFFICE O/P EST HI 40 MIN: CPT | Mod: S$GLB,,, | Performed by: INTERNAL MEDICINE

## 2020-06-16 PROCEDURE — 3079F DIAST BP 80-89 MM HG: CPT | Mod: CPTII,S$GLB,, | Performed by: INTERNAL MEDICINE

## 2020-06-16 PROCEDURE — 1101F PT FALLS ASSESS-DOCD LE1/YR: CPT | Mod: CPTII,S$GLB,, | Performed by: INTERNAL MEDICINE

## 2020-06-16 PROCEDURE — 1159F PR MEDICATION LIST DOCUMENTED IN MEDICAL RECORD: ICD-10-PCS | Mod: S$GLB,,, | Performed by: INTERNAL MEDICINE

## 2020-06-16 PROCEDURE — 99999 PR PBB SHADOW E&M-EST. PATIENT-LVL IV: ICD-10-PCS | Mod: PBBFAC,,, | Performed by: INTERNAL MEDICINE

## 2020-06-16 PROCEDURE — 99215 PR OFFICE/OUTPT VISIT, EST, LEVL V, 40-54 MIN: ICD-10-PCS | Mod: S$GLB,,, | Performed by: INTERNAL MEDICINE

## 2020-06-16 PROCEDURE — 1125F AMNT PAIN NOTED PAIN PRSNT: CPT | Mod: S$GLB,,, | Performed by: INTERNAL MEDICINE

## 2020-06-16 PROCEDURE — 3079F PR MOST RECENT DIASTOLIC BLOOD PRESSURE 80-89 MM HG: ICD-10-PCS | Mod: CPTII,S$GLB,, | Performed by: INTERNAL MEDICINE

## 2020-06-16 PROCEDURE — 99999 PR PBB SHADOW E&M-EST. PATIENT-LVL IV: CPT | Mod: PBBFAC,,, | Performed by: INTERNAL MEDICINE

## 2020-06-16 PROCEDURE — 3074F PR MOST RECENT SYSTOLIC BLOOD PRESSURE < 130 MM HG: ICD-10-PCS | Mod: CPTII,S$GLB,, | Performed by: INTERNAL MEDICINE

## 2020-06-16 PROCEDURE — 1159F MED LIST DOCD IN RCRD: CPT | Mod: S$GLB,,, | Performed by: INTERNAL MEDICINE

## 2020-06-16 PROCEDURE — 3074F SYST BP LT 130 MM HG: CPT | Mod: CPTII,S$GLB,, | Performed by: INTERNAL MEDICINE

## 2020-06-16 PROCEDURE — 1101F PR PT FALLS ASSESS DOC 0-1 FALLS W/OUT INJ PAST YR: ICD-10-PCS | Mod: CPTII,S$GLB,, | Performed by: INTERNAL MEDICINE

## 2020-06-16 PROCEDURE — 1125F PR PAIN SEVERITY QUANTIFIED, PAIN PRESENT: ICD-10-PCS | Mod: S$GLB,,, | Performed by: INTERNAL MEDICINE

## 2020-06-16 RX ORDER — PANCRELIPASE 24000; 76000; 120000 [USP'U]/1; [USP'U]/1; [USP'U]/1
1 CAPSULE, DELAYED RELEASE PELLETS ORAL
Qty: 90 CAPSULE | Refills: 11 | Status: SHIPPED | OUTPATIENT
Start: 2020-06-16 | End: 2021-06-16

## 2020-06-16 RX ORDER — EPINEPHRINE 0.3 MG/.3ML
0.3 INJECTION SUBCUTANEOUS ONCE AS NEEDED
Status: CANCELLED | OUTPATIENT
Start: 2020-07-01

## 2020-06-16 RX ORDER — ONDANSETRON 8 MG/1
8 TABLET, ORALLY DISINTEGRATING ORAL EVERY 8 HOURS PRN
Qty: 60 TABLET | Refills: 6 | Status: SHIPPED | OUTPATIENT
Start: 2020-06-16

## 2020-06-16 RX ORDER — DIPHENHYDRAMINE HYDROCHLORIDE 50 MG/ML
50 INJECTION INTRAMUSCULAR; INTRAVENOUS ONCE AS NEEDED
Status: CANCELLED | OUTPATIENT
Start: 2020-07-01

## 2020-06-16 RX ORDER — TRAMADOL HYDROCHLORIDE 50 MG/1
50 TABLET ORAL EVERY 6 HOURS PRN
Qty: 90 TABLET | Refills: 0 | Status: SHIPPED | OUTPATIENT
Start: 2020-06-16

## 2020-06-16 RX ORDER — SODIUM CHLORIDE 0.9 % (FLUSH) 0.9 %
10 SYRINGE (ML) INJECTION
Status: CANCELLED | OUTPATIENT
Start: 2020-06-19

## 2020-06-16 RX ORDER — METOCLOPRAMIDE 5 MG/1
5 TABLET ORAL 3 TIMES DAILY
Qty: 90 TABLET | Refills: 1 | Status: SHIPPED | OUTPATIENT
Start: 2020-06-16 | End: 2021-06-16

## 2020-06-16 RX ORDER — HEPARIN 100 UNIT/ML
500 SYRINGE INTRAVENOUS
Status: CANCELLED | OUTPATIENT
Start: 2020-07-01

## 2020-06-16 RX ORDER — SODIUM CHLORIDE 0.9 % (FLUSH) 0.9 %
10 SYRINGE (ML) INJECTION
Status: CANCELLED | OUTPATIENT
Start: 2020-07-01

## 2020-06-16 RX ORDER — PANTOPRAZOLE SODIUM 40 MG/1
40 TABLET, DELAYED RELEASE ORAL DAILY
COMMUNITY

## 2020-06-16 RX ORDER — HEPARIN 100 UNIT/ML
500 SYRINGE INTRAVENOUS
Status: CANCELLED | OUTPATIENT
Start: 2020-06-19

## 2020-06-16 RX ORDER — CYPROHEPTADINE HYDROCHLORIDE 4 MG/1
4 TABLET ORAL 4 TIMES DAILY
Qty: 120 TABLET | Refills: 4 | Status: SHIPPED | OUTPATIENT
Start: 2020-06-16

## 2020-06-16 NOTE — PROGRESS NOTES
Subjective:       Patient ID: Mercedes Aviles is a 70 y.o. female.    Chief Complaint: Pancreatic Cancer  Mrs. Aviles is a 70 year old female with HTN, HPL,. Menier's disease who underwent a Nissen Fundoplication and laparoscopic cholecystectomy on 4/21/2020. She presented to ED on 4/25/2020 with nausea and and temp of 99.4. Right upper quadrant ultrasound did not reveal ductal dilation or stones.  She then returned back to ED on 5/8/2020 with jaundice and was noted to have lab with tbili 12, ,  Alkphos 1641.  She underwent Ct scans on 5/8/2020 which revealed soft tissue infiltrative mass, measuring 5.3 cm X 2.8 X 4.3 cm, centered at the ute hepatis that appears to surround the CBD producing CBD as well as intrahepatic dilation. There is encasement of the SMA and celiac branches. It does not appear to arise from the pancreas but infiltrative pancreas neoplasm cannot be excluded. Lymph nodes noted in rigt upper quadrant/ute hepatis. Also stomach wall thickening, venous structures within left hepatic lobe are diminutive raising the possibility of stricture due to infiltrative spread of neoplasm or possible arterial thrombus. Focus noted in right ovary measuring 1.5 cm, appears non specific.  She underwent EUS on 5/11/2020 revealed pancreatic parenchymal abnormalities in the genu of the pancreas, these include 2X 3 cm area / mass and diffuse dilatation of intra hepatic bile ducts. PAth from this procedure revealed atypical groups of ductal cells in a background of chronic pancreatitis.     She then underwent MRI MRCP on 5/12/2020, which revealed biliary duct dilatation with abrupt severe narrowing or occlusion of CBC near the ampulla of vater at the level of pancreatic head. Also infiltrative soft tissue within ute hepatis and retroperitoneum surrounding the celiac and SMA remains nonspecific, differential includes pancreas versus ampullary neoplasm, significant narrowing of portal vein within the  infiltrative mass in ute hepatis, small amount of ascitis.  ERCP on 5/13/2020 and CHD stricture was noted which was brushed and stent placed, also stent placed in PD. Path was negative for malignancy     She then underwent a repeat EUS on 5/20/2020 and was noted to have an abnormal area of poorly defined, hypoechoic, soft tissue density in the area of celiac trunk (actually encasing the vessel) measuring about 2.5 cm.   She also underwent ERCP on same day and was noted to have a single stricture in the CHD which was brushed X 3 and stent was placed.   This final FNA from above reveals pancreatic ductal adenocarcinoma with perineural invasion.    HPIReviewed case in Upper GI conference and consensus is that her tumor is unresectable and plan is to proceed with palliative chemo. She comes in to start palliative chemo with FOLFIRINOX.    Review of Systems   Constitutional: Positive for activity change, appetite change, fatigue and unexpected weight change.   HENT: Negative for mouth sores.    Eyes: Negative for visual disturbance.   Respiratory: Negative for cough and shortness of breath.    Cardiovascular: Negative for chest pain.   Gastrointestinal: Positive for abdominal pain, change in bowel habit, nausea, reflux and change in bowel habit. Negative for diarrhea.        Bloating   Genitourinary: Negative for frequency.   Musculoskeletal: Negative for back pain.   Integumentary:  Negative for rash.   Neurological: Positive for weakness. Negative for headaches.   Hematological: Negative for adenopathy.   Psychiatric/Behavioral: The patient is not nervous/anxious.    All other systems reviewed and are negative.        Objective:      Physical Exam  Vitals signs reviewed.   Constitutional:       Appearance: She is well-developed.   HENT:      Mouth/Throat:      Pharynx: No oropharyngeal exudate.   Cardiovascular:      Rate and Rhythm: Normal rate.      Heart sounds: Normal heart sounds.   Pulmonary:      Effort:  Pulmonary effort is normal.      Breath sounds: Normal breath sounds. No wheezing.   Abdominal:      General: Bowel sounds are normal.      Palpations: Abdomen is soft.      Tenderness: There is no abdominal tenderness.   Musculoskeletal:         General: No tenderness.   Lymphadenopathy:      Cervical: No cervical adenopathy.   Skin:     General: Skin is warm and dry.      Findings: No rash.   Neurological:      Mental Status: She is alert and oriented to person, place, and time.      Coordination: Coordination normal.   Psychiatric:         Thought Content: Thought content normal.         Judgment: Judgment normal.           LABS:  WBC   Date Value Ref Range Status   06/15/2020 6.73 3.90 - 12.70 K/uL Final     Hemoglobin   Date Value Ref Range Status   06/15/2020 12.2 12.0 - 16.0 g/dL Final     Hematocrit   Date Value Ref Range Status   06/15/2020 38.4 37.0 - 48.5 % Final     Platelets   Date Value Ref Range Status   06/15/2020 246 150 - 350 K/uL Final     Gran # (ANC)   Date Value Ref Range Status   06/15/2020 4.4 1.8 - 7.7 K/uL Final     Comment:     The ANC is based on a white cell differential from an   automated cell counter. It has not been microscopically   reviewed for the presence of abnormal cells. Clinical   correlation is required.         Chemistry        Component Value Date/Time     06/15/2020 1130    K 3.5 06/15/2020 1130     06/15/2020 1130    CO2 25 06/15/2020 1130    BUN 29 (H) 06/15/2020 1130    CREATININE 1.1 06/15/2020 1130     06/15/2020 1130        Component Value Date/Time    CALCIUM 9.7 06/15/2020 1130    ALKPHOS 190 (H) 06/15/2020 1130    AST 35 06/15/2020 1130    ALT 18 06/15/2020 1130    BILITOT 1.6 (H) 06/15/2020 1130    ESTGFRAFRICA 60 06/15/2020 1130    EGFRNONAA 52 (A) 06/15/2020 1130          Assessment:       1. Malignant neoplasm of head of pancreas    2. Pancreatic insufficiency    3. Nausea    4. Anorexia    5. Neoplasm related pain        Plan:        1.  Locally advanced pancreas cancer. Will plan on starting palliative chemo tomorrow. Original plan was to start with FOLFIRINOX, but will only treat with dose reduced FOLFOX tomorrow as her ECOG PS is poor.  Sent message to [facility] to obtain EGD to assess for esophageal issues with her globus sensation  2. Escribed creon, and reglan, Phenergan makes her too drowsy. Also advised her  to let her take Tramadol as needed, new script sent. Also will hold off on dilaudid as that makes her drousy as well    Escribed periactin as well.    Patient was consented for chemotherapy today 6/16/2020 .   An extensive discussion was had which included a thorough discussion of the risk and benefits of treatment and alternatives.  Risks, including but not limited to, possible hair loss, bone marrow damage (anemia, thrombocytopenia, immune suppression, neutropenia), damage to body organs (brain, heart, liver, kidney, lungs, nervous system, skin, and others), allergic reactions, sterility, nausea/vomiting, constipation/diarrhea, sores in the mouth, secondary cancers, local damage at possible injection sites, and rarely death were all discussed.  The patient agrees with the plan, and all questions have been answered to their satisfaction.  Consent was signed the patient, provider, and a third party witness.      Above care plan was discussed with patient and accompanying  and all questions were addressed to their satisfaction     Total visit time was over 40 minutes face to face with more than 50% spent on counseling

## 2020-06-16 NOTE — Clinical Note
Patric Marcus,  This is the patient we talked about, has a mass near ampulla/pancreas head, celiac. Has nausea and a feeling of something stuck when she is eating, not eating much at all. We discussed EGD with one of the GI docs. Thanks for your help

## 2020-06-16 NOTE — PATIENT INSTRUCTIONS
Take Zofran or phenergan every 6 hours as needed for nausea and or vomiting.  Take Reglan (metclopromide) before meals three times a day.  Take 1 creon (enzyme pill) before every meal.   Eat small frequent meals  Take Tramadol every 6 hours if needed for pain.  Take periactin for appetite 4 times a day  Take pantoprazole daily    Call if more than 3 loose stools per day. Take imodium as needed for diarrhea.  Use baking soda and salt mixed with water (50%) mouth wash (gurgle and spit) atleast 3 times a day.   Call if you have mouth sores, fever more than 100.4 lasting over 1 hour or a single temperature over 101 F  Also call if loose stool more that 4 per day    Imodium  Diarrhea: take 2 tablets after first loose stool, followed by 1 tablet after each loose stool, up to 8 tablets/day  Fluorouracil, 5-FU injection  What is this medicine?  FLUOROURACIL, 5-FU (flure oh YOOR a brittany) is a chemotherapy drug. It slows the growth of cancer cells. This medicine is used to treat many types of cancer like breast cancer, colon or rectal cancer, pancreatic cancer, and stomach cancer.  How should I use this medicine?  This drug is given as an infusion or injection into a vein. It is administered in a hospital or clinic by a specially trained health care professional.  Talk to your pediatrician regarding the use of this medicine in children. Special care may be needed.  What side effects may I notice from receiving this medicine?  Side effects that you should report to your doctor or health care professional as soon as possible:  · allergic reactions like skin rash, itching or hives, swelling of the face, lips, or tongue  · low blood counts - this medicine may decrease the number of white blood cells, red blood cells and platelets. You may be at increased risk for infections and bleeding.  · signs of infection - fever or chills, cough, sore throat, pain or difficulty passing urine  · signs of decreased platelets or bleeding -  bruising, pinpoint red spots on the skin, black, tarry stools, blood in the urine  · signs of decreased red blood cells - unusually weak or tired, fainting spells, lightheadedness  · breathing problems  · changes in vision  · chest pain  · mouth sores  · nausea and vomiting  · pain, swelling, redness at site where injected  · pain, tingling, numbness in the hands or feet  · redness, swelling, or sores on hands or feet  · stomach pain  · unusual bleeding  Side effects that usually do not require medical attention (report to your doctor or health care professional if they continue or are bothersome):  · changes in finger or toe nails  · diarrhea  · dry or itchy skin  · hair loss  · headache  · loss of appetite  · sensitivity of eyes to the light  · stomach upset  · unusually teary eyes  What may interact with this medicine?  · allopurinol  · cimetidine  · dapsone  · digoxin  · hydroxyurea  · leucovorin  · levamisole  · medicines for seizures like ethotoin, fosphenytoin, phenytoin  · medicines to increase blood counts like filgrastim, pegfilgrastim, sargramostim  · medicines that treat or prevent blood clots like warfarin, enoxaparin, and dalteparin  · methotrexate  · metronidazole  · pyrimethamine  · some other chemotherapy drugs like busulfan, cisplatin, estramustine, vinblastine  · trimethoprim  · trimetrexate  · vaccines  Talk to your doctor or health care professional before taking any of these medicines:  · acetaminophen  · aspirin  · ibuprofen  · ketoprofen  · naproxen  What if I miss a dose?  It is important not to miss your dose. Call your doctor or health care professional if you are unable to keep an appointment.  Where should I keep my medicine?  This drug is given in a hospital or clinic and will not be stored at home.  What should I tell my health care provider before I take this medicine?  They need to know if you have any of these conditions:  · blood disorders  · dihydropyrimidine dehydrogenase (DPD)  deficiency  · infection (especially a virus infection such as chickenpox, cold sores, or herpes)  · kidney disease  · liver disease  · malnourished, poor nutrition  · recent or ongoing radiation therapy  · an unusual or allergic reaction to fluorouracil, other chemotherapy, other medicines, foods, dyes, or preservatives  · pregnant or trying to get pregnant  · breast-feeding  What should I watch for while using this medicine?  Visit your doctor for checks on your progress. This drug may make you feel generally unwell. This is not uncommon, as chemotherapy can affect healthy cells as well as cancer cells. Report any side effects. Continue your course of treatment even though you feel ill unless your doctor tells you to stop.  In some cases, you may be given additional medicines to help with side effects. Follow all directions for their use.  Call your doctor or health care professional for advice if you get a fever, chills or sore throat, or other symptoms of a cold or flu. Do not treat yourself. This drug decreases your body's ability to fight infections. Try to avoid being around people who are sick.  This medicine may increase your risk to bruise or bleed. Call your doctor or health care professional if you notice any unusual bleeding.  Be careful brushing and flossing your teeth or using a toothpick because you may get an infection or bleed more easily. If you have any dental work done, tell your dentist you are receiving this medicine.  Avoid taking products that contain aspirin, acetaminophen, ibuprofen, naproxen, or ketoprofen unless instructed by your doctor. These medicines may hide a fever.  Do not become pregnant while taking this medicine. Women should inform their doctor if they wish to become pregnant or think they might be pregnant. There is a potential for serious side effects to an unborn child. Talk to your health care professional or pharmacist for more information. Do not breast-feed an infant  while taking this medicine.  Men should inform their doctor if they wish to father a child. This medicine may lower sperm counts.  Do not treat diarrhea with over the counter products. Contact your doctor if you have diarrhea that lasts more than 2 days or if it is severe and watery.  This medicine can make you more sensitive to the sun. Keep out of the sun. If you cannot avoid being in the sun, wear protective clothing and use sunscreen. Do not use sun lamps or tanning beds/booths.  NOTE:This sheet is a summary. It may not cover all possible information. If you have questions about this medicine, talk to your doctor, pharmacist, or health care provider. Copyright© 2017 [employer]        Leucovorin injection  What is this medicine?  LEUCOVORIN (loo koe VOR in) is used to prevent or treat the harmful effects of some medicines. This medicine is used to treat anemia caused by a low amount of folic acid in the body. It is also used with 5-fluorouracil (5-FU) to treat colon cancer.  How should I use this medicine?  This medicine is for injection into a muscle or into a vein. It is given by a health care professional in a hospital or clinic setting.  Talk to your pediatrician regarding the use of this medicine in children. Special care may be needed.  What side effects may I notice from receiving this medicine?  Side effects that you should report to your doctor or health care professional as soon as possible:  · allergic reactions like skin rash, itching or hives, swelling of the face, lips, or tongue  · breathing problems  · fever, infection  · mouth sores  · unusual bleeding or bruising  · unusually weak or tired  Side effects that usually do not require medical attention (report to your doctor or health care professional if they continue or are bothersome):  · constipation or diarrhea  · loss of appetite  · nausea, vomiting  What may interact with this  medicine?  · capecitabine  · fluorouracil  · phenobarbital  · phenytoin  · primidone  · trimethoprim-sulfamethoxazole  What if I miss a dose?  This does not apply.  Where should I keep my medicine?  This drug is given in a hospital or clinic and will not be stored at home.  What should I tell my health care provider before I take this medicine?  They need to know if you have any of these conditions:  · anemia from low levels of vitamin B-12 in the blood  · an unusual or allergic reaction to leucovorin, folic acid, other medicines, foods, dyes, or preservatives  · pregnant or trying to get pregnant  · breast-feeding  What should I watch for while using this medicine?  Your condition will be monitored carefully while you are receiving this medicine.  This medicine may increase the side effects of 5-fluorouracil, 5-FU. Tell your doctor or health care professional if you have diarrhea or mouth sores that do not get better or that get worse.  NOTE:This sheet is a summary. It may not cover all possible information. If you have questions about this medicine, talk to your doctor, pharmacist, or health care provider. Copyright© 2017 [employer]        Oxaliplatin Injection  What is this medicine?  OXALIPLATIN (ox AL i JOANIE tin) is a chemotherapy drug. It targets fast dividing cells, like cancer cells, and causes these cells to die. This medicine is used to treat cancers of the colon and rectum, and many other cancers.  How should I use this medicine?  This drug is given as an infusion into a vein. It is administered in a hospital or clinic by a specially trained health care professional.  Talk to your pediatrician regarding the use of this medicine in children. Special care may be needed.  What side effects may I notice from receiving this medicine?  Side effects that you should report to your doctor or health care professional as soon as possible:  · allergic reactions like skin rash, itching or hives, swelling of the  face, lips, or tongue  · low blood counts - This drug may decrease the number of white blood cells, red blood cells and platelets. You may be at increased risk for infections and bleeding.  · signs of infection - fever or chills, cough, sore throat, pain or difficulty passing urine  · signs of decreased platelets or bleeding - bruising, pinpoint red spots on the skin, black, tarry stools, nosebleeds  · signs of decreased red blood cells - unusually weak or tired, fainting spells, lightheadedness  · breathing problems  · chest pain, pressure  · cough  · diarrhea  · jaw tightness  · mouth sores  · nausea and vomiting  · pain, swelling, redness or irritation at the injection site  · pain, tingling, numbness in the hands or feet  · problems with balance, talking, walking  · redness, blistering, peeling or loosening of the skin, including inside the mouth  · trouble passing urine or change in the amount of urine  Side effects that usually do not require medical attention (report to your doctor or health care professional if they continue or are bothersome):  · changes in vision  · constipation  · hair loss  · loss of appetite  · metallic taste in the mouth or changes in taste  · stomach pain  What may interact with this medicine?  · medicines to increase blood counts like filgrastim, pegfilgrastim, sargramostim  · probenecid  · some antibiotics like amikacin, gentamicin, neomycin, polymyxin B, streptomycin, tobramycin  · zalcitabine  Talk to your doctor or health care professional before taking any of these medicines:  · acetaminophen  · aspirin  · ibuprofen  · ketoprofen  · naproxen  What if I miss a dose?  It is important not to miss a dose. Call your doctor or health care professional if you are unable to keep an appointment.  Where should I keep my medicine?  This drug is given in a hospital or clinic and will not be stored at home.  What should I tell my health care provider before I take this medicine?  They need  to know if you have any of these conditions:  · kidney disease  · an unusual or allergic reaction to oxaliplatin, other chemotherapy, other medicines, foods, dyes, or preservatives  · pregnant or trying to get pregnant  · breast-feeding  What should I watch for while using this medicine?  Your condition will be monitored carefully while you are receiving this medicine. You will need important blood work done while you are taking this medicine.  This medicine can make you more sensitive to cold. Do not drink cold drinks or use ice. Cover exposed skin before coming in contact with cold temperatures or cold objects. When out in cold weather wear warm clothing and cover your mouth and nose to warm the air that goes into your lungs. Tell your doctor if you get sensitive to the cold.  This drug may make you feel generally unwell. This is not uncommon, as chemotherapy can affect healthy cells as well as cancer cells. Report any side effects. Continue your course of treatment even though you feel ill unless your doctor tells you to stop.  In some cases, you may be given additional medicines to help with side effects. Follow all directions for their use.  Call your doctor or health care professional for advice if you get a fever, chills or sore throat, or other symptoms of a cold or flu. Do not treat yourself. This drug decreases your body's ability to fight infections. Try to avoid being around people who are sick.  This medicine may increase your risk to bruise or bleed. Call your doctor or health care professional if you notice any unusual bleeding.  Be careful brushing and flossing your teeth or using a toothpick because you may get an infection or bleed more easily. If you have any dental work done, tell your dentist you are receiving this medicine.  Avoid taking products that contain aspirin, acetaminophen, ibuprofen, naproxen, or ketoprofen unless instructed by your doctor. These medicines may hide a fever.  Do not  become pregnant while taking this medicine. Women should inform their doctor if they wish to become pregnant or think they might be pregnant. There is a potential for serious side effects to an unborn child. Talk to your health care professional or pharmacist for more information. Do not breast-feed an infant while taking this medicine.  Call your doctor or health care professional if you get diarrhea. Do not treat yourself.  NOTE:This sheet is a summary. It may not cover all possible information. If you have questions about this medicine, talk to your doctor, pharmacist, or health care provider. Copyright© 2017 [employer]        Irinotecan injection  What is this medicine?  IRINOTECAN (ir in oh RKISTINA norma ) is a chemotherapy drug. It is used to treat colon and rectal cancer.  How should I use this medicine?  This drug is given as an infusion into a vein. It is administered in a hospital or clinic by a specially trained health care professional.  Talk to your pediatrician regarding the use of this medicine in children. Special care may be needed.  What side effects may I notice from receiving this medicine?  Side effects that you should report to your doctor or health care professional as soon as possible:  · allergic reactions like skin rash, itching or hives, swelling of the face, lips, or tongue  · low blood counts - this medicine may decrease the number of white blood cells, red blood cells and platelets. You may be at increased risk for infections and bleeding.  · signs of infection - fever or chills, cough, sore throat, pain or difficulty passing urine  · signs of decreased platelets or bleeding - bruising, pinpoint red spots on the skin, black, tarry stools, blood in the urine  · signs of decreased red blood cells - unusually weak or tired, fainting spells, lightheadedness  · breathing problems  · chest pain  · diarrhea  · feeling faint or lightheaded, falls  · flushing, runny nose, sweating during  infusion  · mouth sores or pain  · pain, swelling, redness or irritation where injected  · pain, swelling, warmth in the leg  · pain, tingling, numbness in the hands or feet  · problems with balance, talking, walking  · stomach cramps, pain  · trouble passing urine or change in the amount of urine  · vomiting as to be unable to hold down drinks or food  · yellowing of the eyes or skin  Side effects that usually do not require medical attention (report to your doctor or health care professional if they continue or are bothersome):  · constipation  · hair loss  · headache  · loss of appetite  · nausea, vomiting  · stomach upset  What may interact with this medicine?  Do not take this medicine with any of the following medications:  · atazanavir  · certain medicines for fungal infections like itraconazole and ketoconazole  · [place] Davids's Wort  This medicine may also interact with the following medications:  · dexamethasone  · diuretics  · laxatives  · medicines for seizures like carbamazepine, mephobarbital, phenobarbital, phenytoin, primidone  · medicines to increase blood counts like filgrastim, pegfilgrastim, sargramostim  · prochlorperazine  · vaccines  What if I miss a dose?  It is important not to miss your dose. Call your doctor or health care professional if you are unable to keep an appointment.  Where should I keep my medicine?  This drug is given in a hospital or clinic and will not be stored at home.  What should I tell my health care provider before I take this medicine?  They need to know if you have any of these conditions:  · blood disorders  · dehydration  · diarrhea  · infection (especially a virus infection such as chickenpox, cold sores, or herpes)  · liver disease  · low blood counts, like low white cell, platelet, or red cell counts  · recent or ongoing radiation therapy  · an unusual or allergic reaction to irinotecan, sorbitol, other chemotherapy, other medicines, foods, dyes, or  preservatives  · pregnant or trying to get pregnant  · breast-feeding  What should I watch for while using this medicine?  Your condition will be monitored carefully while you are receiving this medicine. You will need important blood work done while you are taking this medicine.  This drug may make you feel generally unwell. This is not uncommon, as chemotherapy can affect healthy cells as well as cancer cells. Report any side effects. Continue your course of treatment even though you feel ill unless your doctor tells you to stop.  In some cases, you may be given additional medicines to help with side effects. Follow all directions for their use.  You may get drowsy or dizzy. Do not drive, use machinery, or do anything that needs mental alertness until you know how this medicine affects you. Do not stand or sit up quickly, especially if you are an older patient. This reduces the risk of dizzy or fainting spells.  Call your doctor or health care professional for advice if you get a fever, chills or sore throat, or other symptoms of a cold or flu. Do not treat yourself. This drug decreases your body's ability to fight infections. Try to avoid being around people who are sick.  This medicine may increase your risk to bruise or bleed. Call your doctor or health care professional if you notice any unusual bleeding.  Be careful brushing and flossing your teeth or using a toothpick because you may get an infection or bleed more easily. If you have any dental work done, tell your dentist you are receiving this medicine.  Avoid taking products that contain aspirin, acetaminophen, ibuprofen, naproxen, or ketoprofen unless instructed by your doctor. These medicines may hide a fever.  Do not become pregnant while taking this medicine. Women should inform their doctor if they wish to become pregnant or think they might be pregnant. There is a potential for serious side effects to an unborn child. Talk to your health care  professional or pharmacist for more information. Do not breast-feed an infant while taking this medicine.  NOTE:This sheet is a summary. It may not cover all possible information. If you have questions about this medicine, talk to your doctor, pharmacist, or health care provider. Copyright© 2017 [employer]

## 2020-06-17 ENCOUNTER — INFUSION (OUTPATIENT)
Dept: INFUSION THERAPY | Facility: HOSPITAL | Age: 70
End: 2020-06-17
Attending: INTERNAL MEDICINE
Payer: COMMERCIAL

## 2020-06-17 ENCOUNTER — TELEPHONE (OUTPATIENT)
Dept: ENDOSCOPY | Facility: HOSPITAL | Age: 70
End: 2020-06-17

## 2020-06-17 VITALS
OXYGEN SATURATION: 98 % | RESPIRATION RATE: 17 BRPM | HEART RATE: 98 BPM | SYSTOLIC BLOOD PRESSURE: 125 MMHG | DIASTOLIC BLOOD PRESSURE: 77 MMHG | TEMPERATURE: 98 F

## 2020-06-17 DIAGNOSIS — C25.0 MALIGNANT NEOPLASM OF HEAD OF PANCREAS: Primary | ICD-10-CM

## 2020-06-17 DIAGNOSIS — C25.9 MALIGNANT NEOPLASM OF PANCREAS, UNSPECIFIED LOCATION OF MALIGNANCY: Primary | ICD-10-CM

## 2020-06-17 LAB — SARS-COV-2 RNA RESP QL NAA+PROBE: NOT DETECTED

## 2020-06-17 PROCEDURE — 96368 THER/DIAG CONCURRENT INF: CPT

## 2020-06-17 PROCEDURE — 96367 TX/PROPH/DG ADDL SEQ IV INF: CPT

## 2020-06-17 PROCEDURE — 88323 CONSLTJ&REPRT MATRL PREP SLD: CPT | Performed by: PATHOLOGY

## 2020-06-17 PROCEDURE — 96361 HYDRATE IV INFUSION ADD-ON: CPT

## 2020-06-17 PROCEDURE — 96375 TX/PRO/DX INJ NEW DRUG ADDON: CPT

## 2020-06-17 PROCEDURE — 88321 CONSLTJ&REPRT SLD PREP ELSWR: CPT | Performed by: PATHOLOGY

## 2020-06-17 PROCEDURE — 96415 CHEMO IV INFUSION ADDL HR: CPT

## 2020-06-17 PROCEDURE — 25000003 PHARM REV CODE 250: Performed by: INTERNAL MEDICINE

## 2020-06-17 PROCEDURE — 96413 CHEMO IV INFUSION 1 HR: CPT

## 2020-06-17 PROCEDURE — 63600175 PHARM REV CODE 636 W HCPCS: Performed by: INTERNAL MEDICINE

## 2020-06-17 PROCEDURE — 96416 CHEMO PROLONG INFUSE W/PUMP: CPT

## 2020-06-17 RX ORDER — HYDROMORPHONE HYDROCHLORIDE 2 MG/ML
1 INJECTION, SOLUTION INTRAMUSCULAR; INTRAVENOUS; SUBCUTANEOUS
Status: COMPLETED | OUTPATIENT
Start: 2020-06-17 | End: 2020-06-17

## 2020-06-17 RX ORDER — HYDROMORPHONE HYDROCHLORIDE 2 MG/ML
INJECTION, SOLUTION INTRAMUSCULAR; INTRAVENOUS; SUBCUTANEOUS
Status: DISCONTINUED
Start: 2020-06-17 | End: 2020-06-17 | Stop reason: HOSPADM

## 2020-06-17 RX ADMIN — DEXAMETHASONE SODIUM PHOSPHATE: 10 INJECTION, SOLUTION INTRAMUSCULAR; INTRAVENOUS at 09:06

## 2020-06-17 RX ADMIN — FLUOROURACIL 2415 MG: 50 INJECTION, SOLUTION INTRAVENOUS at 12:06

## 2020-06-17 RX ADMIN — OXALIPLATIN 128 MG: 5 INJECTION, SOLUTION INTRAVENOUS at 10:06

## 2020-06-17 RX ADMIN — LEUCOVORIN CALCIUM 605 MG: 350 INJECTION, POWDER, LYOPHILIZED, FOR SOLUTION INTRAMUSCULAR; INTRAVENOUS at 10:06

## 2020-06-17 RX ADMIN — SODIUM CHLORIDE 1000 ML: 0.9 INJECTION, SOLUTION INTRAVENOUS at 08:06

## 2020-06-17 RX ADMIN — HYDROMORPHONE HYDROCHLORIDE 1 MG: 2 INJECTION, SOLUTION INTRAMUSCULAR; INTRAVENOUS; SUBCUTANEOUS at 10:06

## 2020-06-17 NOTE — PLAN OF CARE
Pt arrived to unit in wheelchair, accompanied by . Pt and  oriented to unit. VSS. Pt afebrile. Reinforced possible side effects of chemo and self care tips. Pt and 's questions answered to their satisfaction. Tolerated premed. Pt reported pain to lower abd at 7. Dr. Singh notified and orders received. Tolerated dilaudid and pain rated 0. Tolerated Oxaliplatin and leucovorin. Pt's  viewed instructional video on INFU pump and care of pump. Reinforced to pt and  to keep central line dressing dry and pump in the pouch. Both verbalized understanding. Handouts on n/v and mouth care along with AVS given to pt. Pt denies n/v or pain at discharged. Pt in wheelchair accompanied by  off unit.

## 2020-06-17 NOTE — TELEPHONE ENCOUNTER
MD Maryann De Los Santos MA             Please schedule an urgent EGD for dysphagia and nausea. Patient with  cancer.   Thanks!   Amrit      Please sign order

## 2020-06-18 ENCOUNTER — TELEPHONE (OUTPATIENT)
Dept: ENDOSCOPY | Facility: HOSPITAL | Age: 70
End: 2020-06-18

## 2020-06-18 NOTE — TELEPHONE ENCOUNTER
----- Message from Germania Moseley sent at 6/18/2020 10:19 AM CDT -----  Contact:   Dennise     Pt's  returning your call about an appt      Needs to give you some additional info        Please call       Buddy   [phone]   Thanks

## 2020-06-18 NOTE — TELEPHONE ENCOUNTER
----- Message from Krystin Jurado sent at 6/18/2020  8:41 AM CDT -----  Returning call from Marcie Moscoso #[phone]

## 2020-06-18 NOTE — TELEPHONE ENCOUNTER
Spoke with patient and . Offer to schedule EGD for tomorrow. Patient and  declined stating that she has another appointment. Also she has had this issue for a while and does not feel that it has to be done asap.

## 2020-06-19 ENCOUNTER — TELEPHONE (OUTPATIENT)
Dept: HEMATOLOGY/ONCOLOGY | Facility: CLINIC | Age: 70
End: 2020-06-19

## 2020-06-19 ENCOUNTER — INFUSION (OUTPATIENT)
Dept: INFUSION THERAPY | Facility: HOSPITAL | Age: 70
End: 2020-06-19
Attending: INTERNAL MEDICINE
Payer: COMMERCIAL

## 2020-06-19 VITALS
OXYGEN SATURATION: 97 % | TEMPERATURE: 98 F | SYSTOLIC BLOOD PRESSURE: 112 MMHG | RESPIRATION RATE: 17 BRPM | HEART RATE: 104 BPM | DIASTOLIC BLOOD PRESSURE: 73 MMHG

## 2020-06-19 DIAGNOSIS — C25.0 MALIGNANT NEOPLASM OF HEAD OF PANCREAS: Primary | ICD-10-CM

## 2020-06-19 PROCEDURE — 96374 THER/PROPH/DIAG INJ IV PUSH: CPT

## 2020-06-19 PROCEDURE — 96361 HYDRATE IV INFUSION ADD-ON: CPT

## 2020-06-19 PROCEDURE — 96377 APPLICATON ON-BODY INJECTOR: CPT | Mod: 59

## 2020-06-19 PROCEDURE — 25000003 PHARM REV CODE 250: Performed by: INTERNAL MEDICINE

## 2020-06-19 PROCEDURE — A4216 STERILE WATER/SALINE, 10 ML: HCPCS | Performed by: INTERNAL MEDICINE

## 2020-06-19 PROCEDURE — 63600175 PHARM REV CODE 636 W HCPCS: Mod: JG | Performed by: INTERNAL MEDICINE

## 2020-06-19 RX ORDER — SODIUM CHLORIDE 0.9 % (FLUSH) 0.9 %
10 SYRINGE (ML) INJECTION
Status: DISCONTINUED | OUTPATIENT
Start: 2020-06-19 | End: 2020-06-19 | Stop reason: HOSPADM

## 2020-06-19 RX ORDER — HYDROMORPHONE HYDROCHLORIDE 2 MG/ML
1 INJECTION, SOLUTION INTRAMUSCULAR; INTRAVENOUS; SUBCUTANEOUS
Status: COMPLETED | OUTPATIENT
Start: 2020-06-19 | End: 2020-06-19

## 2020-06-19 RX ORDER — HEPARIN 100 UNIT/ML
500 SYRINGE INTRAVENOUS
Status: DISCONTINUED | OUTPATIENT
Start: 2020-06-19 | End: 2020-06-19 | Stop reason: HOSPADM

## 2020-06-19 RX ADMIN — Medication 10 ML: at 12:06

## 2020-06-19 RX ADMIN — SODIUM CHLORIDE 1000 ML: 0.9 INJECTION, SOLUTION INTRAVENOUS at 11:06

## 2020-06-19 RX ADMIN — HYDROMORPHONE HYDROCHLORIDE 1 MG: 2 INJECTION, SOLUTION INTRAMUSCULAR; INTRAVENOUS; SUBCUTANEOUS at 11:06

## 2020-06-19 RX ADMIN — HEPARIN 500 UNITS: 100 SYRINGE at 12:06

## 2020-06-19 RX ADMIN — PEGFILGRASTIM 6 MG: KIT SUBCUTANEOUS at 11:06

## 2020-06-19 NOTE — PLAN OF CARE
Pt arrived to unit in wheelchair. 5Fu per pump completed. Pt states she has had severe nausea and some vomiting. Pt feels like there is a lump in her throat and she can not eat what she wants. Started with diarrhea this morning at 2am and is taking imodium. Has not had pain med since 7p on 6/18. Dr. Singh informed of pts status. Will give IVF and dilaudid. Pain rated 7 to lower abd, completely relieved after medication. Pt and  instructed about use and application of on body Neulasta. Applied to left arm. Pt given handouts and informed when the medication will be delivered on 6/21. AVS given to pt. Pt and  instructed to call md of any changes in status. Verbalized understanding. Pt in wheelchair, discharged from unit with , no distress noted.

## 2020-06-20 ENCOUNTER — TELEPHONE (OUTPATIENT)
Dept: GASTROENTEROLOGY | Facility: CLINIC | Age: 70
End: 2020-06-20

## 2020-06-22 ENCOUNTER — NURSE TRIAGE (OUTPATIENT)
Dept: ADMINISTRATIVE | Facility: CLINIC | Age: 70
End: 2020-06-22

## 2020-06-22 NOTE — TELEPHONE ENCOUNTER
Pt contacted through Post Procedural Symptom Tracking. Spoke with /caregiver, denies any cough, fever, or difficulty breathing since procedure.      Reason for Disposition   Health Information question, no triage required and triager able to answer question    Additional Information   Negative: [1] Caller is not with the adult (patient) AND [2] reporting urgent symptoms   Negative: Lab result questions   Negative: Medication questions   Negative: Caller can't be reached by phone   Negative: Caller has already spoken to PCP or another triager   Negative: RN needs further essential information from caller in order to complete triage   Negative: Requesting regular office appointment   Negative: [1] Caller requesting NON-URGENT health information AND [2] PCP's office is the best resource    Protocols used: INFORMATION ONLY CALL-A-AH

## 2020-06-23 ENCOUNTER — PATIENT MESSAGE (OUTPATIENT)
Dept: HEMATOLOGY/ONCOLOGY | Facility: CLINIC | Age: 70
End: 2020-06-23

## 2020-06-23 NOTE — TELEPHONE ENCOUNTER
I want to make sure I understand correctly---she needs egd. Then based on those results, a peg tube?  ~Norma

## 2020-06-24 ENCOUNTER — ANESTHESIA EVENT (OUTPATIENT)
Dept: ENDOSCOPY | Facility: HOSPITAL | Age: 70
End: 2020-06-24
Payer: COMMERCIAL

## 2020-06-24 ENCOUNTER — PATIENT MESSAGE (OUTPATIENT)
Dept: HEMATOLOGY/ONCOLOGY | Facility: CLINIC | Age: 70
End: 2020-06-24

## 2020-06-24 ENCOUNTER — TELEPHONE (OUTPATIENT)
Dept: ENDOSCOPY | Facility: HOSPITAL | Age: 70
End: 2020-06-24

## 2020-06-24 NOTE — TELEPHONE ENCOUNTER
Spoke with patient's  about instructions for EGD scheduled tomorrow 6/25/20 at 1300.  Rapid covid-19 test at 1030.

## 2020-06-25 ENCOUNTER — ANESTHESIA (OUTPATIENT)
Dept: ENDOSCOPY | Facility: HOSPITAL | Age: 70
End: 2020-06-25
Payer: COMMERCIAL

## 2020-06-25 ENCOUNTER — HOSPITAL ENCOUNTER (OUTPATIENT)
Facility: HOSPITAL | Age: 70
Discharge: HOME | End: 2020-06-25
Attending: INTERNAL MEDICINE | Admitting: INTERNAL MEDICINE
Payer: COMMERCIAL

## 2020-06-25 VITALS
TEMPERATURE: 98 F | HEART RATE: 89 BPM | BODY MASS INDEX: 20.2 KG/M2 | RESPIRATION RATE: 17 BRPM | OXYGEN SATURATION: 97 % | DIASTOLIC BLOOD PRESSURE: 71 MMHG | SYSTOLIC BLOOD PRESSURE: 118 MMHG | HEIGHT: 63 IN | WEIGHT: 114 LBS

## 2020-06-25 DIAGNOSIS — C25.9 PANCREAS CANCER: ICD-10-CM

## 2020-06-25 LAB — SARS-COV-2 RDRP RESP QL NAA+PROBE: NEGATIVE

## 2020-06-25 PROCEDURE — 43246 EGD PLACE GASTROSTOMY TUBE: CPT | Mod: ,,, | Performed by: INTERNAL MEDICINE

## 2020-06-25 PROCEDURE — 63600175 PHARM REV CODE 636 W HCPCS: Performed by: NURSE ANESTHETIST, CERTIFIED REGISTERED

## 2020-06-25 PROCEDURE — D9220A PRA ANESTHESIA: Mod: CRNA,,, | Performed by: NURSE ANESTHETIST, CERTIFIED REGISTERED

## 2020-06-25 PROCEDURE — 25000003 PHARM REV CODE 250: Performed by: INTERNAL MEDICINE

## 2020-06-25 PROCEDURE — 43246 EGD PLACE GASTROSTOMY TUBE: CPT | Performed by: INTERNAL MEDICINE

## 2020-06-25 PROCEDURE — 43246 PR EGD, FLEX, W/PLCMT, GASTROSTOMY TUBE: ICD-10-PCS | Mod: ,,, | Performed by: INTERNAL MEDICINE

## 2020-06-25 PROCEDURE — 27201018 HC KIT, PEG (ANY): Performed by: INTERNAL MEDICINE

## 2020-06-25 PROCEDURE — U0002 COVID-19 LAB TEST NON-CDC: HCPCS

## 2020-06-25 PROCEDURE — 37000009 HC ANESTHESIA EA ADD 15 MINS: Performed by: INTERNAL MEDICINE

## 2020-06-25 PROCEDURE — D9220A PRA ANESTHESIA: Mod: ANES,,, | Performed by: ANESTHESIOLOGY

## 2020-06-25 PROCEDURE — 37000008 HC ANESTHESIA 1ST 15 MINUTES: Performed by: INTERNAL MEDICINE

## 2020-06-25 PROCEDURE — 94761 N-INVAS EAR/PLS OXIMETRY MLT: CPT

## 2020-06-25 PROCEDURE — D9220A PRA ANESTHESIA: ICD-10-PCS | Mod: CRNA,,, | Performed by: NURSE ANESTHETIST, CERTIFIED REGISTERED

## 2020-06-25 PROCEDURE — D9220A PRA ANESTHESIA: ICD-10-PCS | Mod: ANES,,, | Performed by: ANESTHESIOLOGY

## 2020-06-25 RX ORDER — SODIUM CHLORIDE 9 MG/ML
INJECTION, SOLUTION INTRAVENOUS CONTINUOUS
Status: DISCONTINUED | OUTPATIENT
Start: 2020-06-25 | End: 2020-06-25 | Stop reason: HOSPADM

## 2020-06-25 RX ORDER — GLYCOPYRROLATE 0.2 MG/ML
INJECTION INTRAMUSCULAR; INTRAVENOUS
Status: DISCONTINUED | OUTPATIENT
Start: 2020-06-25 | End: 2020-06-25

## 2020-06-25 RX ORDER — LORAZEPAM 2 MG/ML
0.25 INJECTION INTRAMUSCULAR ONCE AS NEEDED
Status: DISCONTINUED | OUTPATIENT
Start: 2020-06-25 | End: 2020-06-25 | Stop reason: HOSPADM

## 2020-06-25 RX ORDER — HYDROMORPHONE HYDROCHLORIDE 1 MG/ML
0.2 INJECTION, SOLUTION INTRAMUSCULAR; INTRAVENOUS; SUBCUTANEOUS EVERY 5 MIN PRN
Status: DISCONTINUED | OUTPATIENT
Start: 2020-06-25 | End: 2020-06-25 | Stop reason: HOSPADM

## 2020-06-25 RX ORDER — PROPOFOL 10 MG/ML
VIAL (ML) INTRAVENOUS
Status: DISCONTINUED | OUTPATIENT
Start: 2020-06-25 | End: 2020-06-25

## 2020-06-25 RX ORDER — SODIUM CHLORIDE 0.9 % (FLUSH) 0.9 %
3 SYRINGE (ML) INJECTION
Status: DISCONTINUED | OUTPATIENT
Start: 2020-06-25 | End: 2020-06-25 | Stop reason: HOSPADM

## 2020-06-25 RX ORDER — CEFAZOLIN SODIUM 1 G/3ML
INJECTION, POWDER, FOR SOLUTION INTRAMUSCULAR; INTRAVENOUS
Status: DISCONTINUED | OUTPATIENT
Start: 2020-06-25 | End: 2020-06-25

## 2020-06-25 RX ORDER — PROPOFOL 10 MG/ML
VIAL (ML) INTRAVENOUS CONTINUOUS PRN
Status: DISCONTINUED | OUTPATIENT
Start: 2020-06-25 | End: 2020-06-25

## 2020-06-25 RX ORDER — LIDOCAINE HYDROCHLORIDE 10 MG/ML
1 INJECTION, SOLUTION EPIDURAL; INFILTRATION; INTRACAUDAL; PERINEURAL ONCE
Status: COMPLETED | OUTPATIENT
Start: 2020-06-25 | End: 2020-06-25

## 2020-06-25 RX ORDER — METOCLOPRAMIDE HYDROCHLORIDE 5 MG/ML
10 INJECTION INTRAMUSCULAR; INTRAVENOUS EVERY 10 MIN PRN
Status: DISCONTINUED | OUTPATIENT
Start: 2020-06-25 | End: 2020-06-25 | Stop reason: HOSPADM

## 2020-06-25 RX ORDER — ONDANSETRON 2 MG/ML
INJECTION INTRAMUSCULAR; INTRAVENOUS
Status: DISCONTINUED | OUTPATIENT
Start: 2020-06-25 | End: 2020-06-25

## 2020-06-25 RX ORDER — LIDOCAINE HYDROCHLORIDE 20 MG/ML
INJECTION INTRAVENOUS
Status: DISCONTINUED | OUTPATIENT
Start: 2020-06-25 | End: 2020-06-25

## 2020-06-25 RX ORDER — PROMETHAZINE HYDROCHLORIDE 25 MG/1
12.5 TABLET ORAL EVERY 6 HOURS PRN
COMMUNITY

## 2020-06-25 RX ADMIN — LIDOCAINE HYDROCHLORIDE 2 MG: 10 INJECTION, SOLUTION EPIDURAL; INFILTRATION; INTRACAUDAL; PERINEURAL at 11:06

## 2020-06-25 RX ADMIN — PROPOFOL 80 MG: 10 INJECTION, EMULSION INTRAVENOUS at 12:06

## 2020-06-25 RX ADMIN — CEFAZOLIN 2 G: 1 INJECTION, POWDER, FOR SOLUTION INTRAVENOUS at 12:06

## 2020-06-25 RX ADMIN — LIDOCAINE HYDROCHLORIDE 50 MG: 20 INJECTION, SOLUTION INTRAVENOUS at 12:06

## 2020-06-25 RX ADMIN — SODIUM CHLORIDE: 0.9 INJECTION, SOLUTION INTRAVENOUS at 12:06

## 2020-06-25 RX ADMIN — PROPOFOL 200 MCG/KG/MIN: 10 INJECTION, EMULSION INTRAVENOUS at 12:06

## 2020-06-25 RX ADMIN — ONDANSETRON 4 MG: 2 INJECTION INTRAMUSCULAR; INTRAVENOUS at 12:06

## 2020-06-25 NOTE — ANESTHESIA POSTPROCEDURE EVALUATION
Anesthesia Post Evaluation    Patient: Mercedes Aviles    Procedure(s) Performed: Procedure(s) (LRB):  EGD (ESOPHAGOGASTRODUODENOSCOPY) (N/A)    Final Anesthesia Type: general    Patient location during evaluation: PACU  Patient participation: Yes- Able to Participate  Level of consciousness: awake and alert and oriented  Post-procedure vital signs: reviewed and stable  Pain management: adequate  Airway patency: patent    PONV status at discharge: No PONV  Anesthetic complications: no      Cardiovascular status: hemodynamically stable  Respiratory status: unassisted, spontaneous ventilation and room air  Hydration status: euvolemic  Follow-up not needed.          Vitals Value Taken Time   /71 06/25/20 1445   Temp 36.6 °C (97.9 °F) 06/25/20 1321   Pulse 89 06/25/20 1445   Resp 17 06/25/20 1445   SpO2 97 % 06/25/20 1445         No case tracking events are documented in the log.      Pain/Troy Score: Troy Score: 10 (6/25/2020  1:40 PM)

## 2020-06-25 NOTE — TRANSFER OF CARE
"Anesthesia Transfer of Care Note    Patient: Mercedes Aviles    Procedure(s) Performed: Procedure(s) (LRB):  EGD (ESOPHAGOGASTRODUODENOSCOPY) (N/A)    Patient location: PACU    Anesthesia Type: general    Transport from OR: Transported from OR on room air with adequate spontaneous ventilation    Post pain: adequate analgesia    Post assessment: no apparent anesthetic complications and tolerated procedure well    Post vital signs: stable    Level of consciousness: responds to stimulation and sedated    Nausea/Vomiting: no nausea/vomiting    Complications: none    Transfer of care protocol was followed      Last vitals:   Visit Vitals  /81 (BP Location: Right arm, Patient Position: Lying)   Pulse 94   Temp 36.6 °C (97.9 °F) (Oral)   Resp 18   Ht 5' 2.5" (1.588 m)   Wt 51.7 kg (114 lb)   LMP 08/08/2012   SpO2 95%   Breastfeeding No   BMI 20.52 kg/m²     "

## 2020-06-25 NOTE — DISCHARGE INSTRUCTIONS
Understanding PEG Tube Feeding    Healthcare providers use PEG tube feeding (also called percutaneous endoscopic gastrostomy) when you cant swallow food safely or there is a blockage in your esophagus or stomach. Healthcare providers also use the tube if you cant take enough food by mouth. The feeding tube lets food bypass the mouth and esophagus and go directly into your stomach or small intestine.  The path of food  When you take food by mouth, you chew your food into small pieces and swallow. The food moves down your esophagus into your stomach. From there, it goes into your small intestine and then into your large intestine. Solid waste (stool) is stored in your rectum and passed out through the anus.  How a PEG feeding tube is placed  Your healthcare provider places PEG tubes with the aid of a special instrument called an endoscope. This is a long, flexible, lighted tube that allows your healthcare provider to see inside your stomach. Your healthcare provider passes the endoscope through your mouth into your stomach. Your healthcare provider will also make a small surgical cut through your skin and into your stomach. He or she inserts the PEG tube through the opening while watching through the endoscope. A special balloon or cap holds the PEG tube in place inside your stomach. Your healthcare provider places a small dressing at the new opening.  Digestion works the same  Digestion works the same with a feeding tube as it does when you take food by mouth. So you get the same nutrition by tube feeding as you would get by mouth. If you have any questions or concerns about the feeding tube or its care, be certain to ask your provider. If you want a partner or significant other educated along with you about the feeding tube, let your provider or medical team know.   Date Last Reviewed: 7/1/2016 © 2000-2017 [employer]. [address] [place] [address], [place] [address]. All rights reserved. This  information is not intended as a substitute for professional medical care. Always follow your healthcare professional's instructions.        Discharge Instructions: Caring for Your Gastrostomy Tube (G-Tube)  You have been discharged with a gastrostomy tube, or G-tube. The G-tube was inserted through your belly (abdominal) wall and into your stomach. The tube will provide you with food, fluids, and medicine. Your G-tube may move in and out slightly. If the tube comes out all the way in the first few weeks after placement, dont put it back in. Call your healthcare provider right away. Dont wait until the next day. This is important because the G-tube tract through the skin may close very quickly, often within 24 hours. After the first few weeks, if the tube comes out, ask your provider how to get a spare tube. Ask your provider how to replace it at home.   General guidelines for use  · Wash your hands thoroughly with soap and warm water before starting your feeding.  · During the feeding and for 1 hour after, sit in a chair or sit up in bed.  · Before feeding begins, check to see that your stomach is empty. You will need a syringe for the following steps:   ¨ Insert the tip of an empty syringe into the end of the G-tube.  ¨ Pull back on the syringe to withdraw contents of the stomach.  ¨ Dont begin the feeding if more than 100 mL remains from the previous feeding.  · Clean the area around the tube with mild soap and water.  · Pat the area dry during bathing and as needed.  · Clean the area more often if it gets wet or is leaking some discharge (weeping).  · Keep the disk (flange) a few millimeters off the skin. This should leave just enough room for a gauze sponge. Pulling the flange too tightly can damage the skin. But leaving the flange too loose leads to leaking around the G-tube. Your healthcare team will go over these guidelines before you leave the hospital.     The name of my feeding supplement/formula  is:  ___________________________________________     Amount per feeding:   ___________________________________________     Times per day:  ___________________________________________     Amount of water used to flush tube:  ___________________________________________   Gravity feeding method  · Fill the feeding bag with the prescribed amount of formula. Run the fluid to the end of the tube to clear out any air. Clamp the tube.  · Connect the end of the feeding bag tubing to the G-tube.  · Hang the bag at least 18 inches above the level of your G-tube.  · Open the clamp and allow the formula to flow into the G-tube.  · Follow with the prescribed amount of water.  · After each feeding, rinse the bag and tubing. Every 24 hours, wash the bag and tubing with soapy water and rinse thoroughly.  Pump feeding method  · Fill the feeding bag with the prescribed amount of formula. Run the fluid to the end of the tube to clear out any air. Clamp the tube.  · Connect the end of the feeding bag tubing to the G-tube. Set the pump rate of flow to the prescribed rate per hour.  · Open the clamp on the tubing; press the start button on your pump.  · When feeding is complete, disconnect the feeding set.  · Connect the tip of an empty syringe to the feeding tube and slowly push in the prescribed amount of water.  · After each feeding, rinse the bag and tubing. Every 24 hours, wash the bag and tubing with soapy water and rinse thoroughly.  Syringe feeding method  · Remove the plunger from a syringe and connect the syringe to the G-tube.  · Hold the syringe upright and pour the formula into the syringe.  · Refill the syringe as the formula reaches the bottom of the syringe.  · Repeat the process until the prescribed amount of formula is given.  · Follow the feeding with the prescribed amount of water.  · After each feeding, rinse the bag and tubing. Every 24 hours, wash the bag and tubing with soapy water and rinse thoroughly.  When to  call your provider  Call your healthcare provider right away if you have any of the following:  · The tube comes out   · The tube is blocked  · Vomiting  · Fever above 100.4°F (38.0°C)  · Diarrhea that lasts more than 2 days  · Signs of infection (redness, swelling, or warmth at the tube site)  · Drainage from the tube site   Date Last Reviewed: 8/1/2016  © 7185-8499 [employer]. [address] [place] [address], [place] [address]. All rights reserved. This information is not intended as a substitute for professional medical care. Always follow your healthcare professional's instructions.

## 2020-06-25 NOTE — PLAN OF CARE
Pt and spouse given dc instructions. Dr Mae spoke to pt and spouse at bedside. Peg tube teaching reviewed with pt and spouse. Pt to have home health in a day or two. Pt with no c/o pain.

## 2020-06-25 NOTE — ANESTHESIA PREPROCEDURE EVALUATION
06/25/2020  Mercedes Aviles is a 70 y.o., female.    Anesthesia Evaluation    I have reviewed the Patient Summary Reports.    I have reviewed the Nursing Notes.    I have reviewed the Medications.     Review of Systems  Anesthesia Hx:  No problems with previous Anesthesia  History of prior surgery of interest to airway management or planning: Previous anesthesia: General  Denies Personal Hx of Anesthesia complications.   Cardiovascular:   Hypertension hyperlipidemia    Pulmonary:  Pulmonary Normal    Renal/:  Renal/ Normal     Hepatic/GI:   GERD Pancreatic ca   Neurological:  Neurology Normal    Endocrine:   Hypothyroidism Hyperthyroidism    Psych:   Psychiatric History anxiety          Physical Exam  General:  Well nourished    Airway/Jaw/Neck:  Airway Findings: Mouth Opening: Normal Tongue: Normal  General Airway Assessment: Adult  Mallampati: II  TM Distance: Normal, at least 6 cm  Jaw/Neck Findings:  Neck ROM: Normal ROM      Dental:  Dental Findings: In tact   Chest/Lungs:  Chest/Lungs Findings: Clear to auscultation     Heart/Vascular:  Heart Findings: Rate: Normal  Rhythm: Regular Rhythm  Sounds: Normal        Mental Status:  Mental Status Findings:  Cooperative, Alert and Oriented        Patient Active Problem List   Diagnosis    Anxiety    Hypertension    Menopause syndrome    Allergic contact dermatitis    Hypothyroidism    Meniere disease    Cancer of pancreas    Pancreas cancer     Past Medical History:   Diagnosis Date    Abdominal pain     Anorexia     Fatigue     GERD (gastroesophageal reflux disease)     Hyperlipidemia     Hypertension     Jaundice     Meniere disease     Nausea     Pancreas cancer     Pancreatic insufficiency     Psoriasis     Weakness     Weight loss, unintentional      Past Surgical History:   Procedure Laterality Date    CARPAL TUNNEL RELEASE       right     COLONOSCOPY N/A 8/10/2018    Procedure: COLONOSCOPY;  Surgeon: Stiven Stoner MD;  Location: [facility] (4TH FLR);  Service: Endoscopy;  Laterality: N/A;  generated from previous   pt requesting this date.    EAR MASTOIDECTOMY W/ COCHLEAR IMPLANT W/ LANDMARK      FOOT SURGERY      HYSTERECTOMY      in her 40's    INSERTION OF TUNNELED CENTRAL VENOUS CATHETER (CVC) WITH SUBCUTANEOUS PORT Right 2020    Procedure: [id number], RIGHT POSS LEFT;  Surgeon: Raleigh Dye MD;  [facility];  Service: General;  Laterality: Right;    LAPAROSCOPIC CHOLECYSTECTOMY      NISSEN FUNDOPLICATION      tarsal tunnel      right foot     TONSILLECTOMY           Anesthesia Plan  Type of Anesthesia, risks & benefits discussed:  Anesthesia Type:  general  Patient's Preference:   Intra-op Monitoring Plan: standard ASA monitors  Intra-op Monitoring Plan Comments:   Post Op Pain Control Plan: per primary service following discharge from PACU  Post Op Pain Control Plan Comments:   Induction:   IV  Beta Blocker:  Patient is not currently on a Beta-Blocker (No further documentation required).       Informed Consent: Patient understands risks and agrees with Anesthesia plan.  Questions answered. Anesthesia consent signed with patient.  ASA Score: 3     Day of Surgery Review of History & Physical:    H&P update referred to the surgeon.         Ready For Surgery From Anesthesia Perspective.

## 2020-06-26 ENCOUNTER — TELEPHONE (OUTPATIENT)
Dept: HEMATOLOGY/ONCOLOGY | Facility: CLINIC | Age: 70
End: 2020-06-26

## 2020-06-26 ENCOUNTER — DOCUMENTATION ONLY (OUTPATIENT)
Dept: HEMATOLOGY/ONCOLOGY | Facility: CLINIC | Age: 70
End: 2020-06-26

## 2020-06-26 DIAGNOSIS — R13.10 DYSPHAGIA, UNSPECIFIED TYPE: ICD-10-CM

## 2020-06-26 DIAGNOSIS — C25.0 MALIGNANT NEOPLASM OF HEAD OF PANCREAS: Primary | ICD-10-CM

## 2020-06-26 NOTE — TELEPHONE ENCOUNTER
Spoke with patient's  regarding change in nutrition appointment (moved up to Tuesday) due to PEG placement yesterday.   Per , patient is able to eat whatever she tolerates PO but her current intake has been very limited. She has tolerated Boost in the past (small volumes PO).     Reviewed medications, labs, medical history.  Patient would be appropriate for Boost Plus for tube feeds which will provide flexibility to transition to PO intake as tolerated. Will place orders.  Social work to follow up with patient regarding delivery of supplies/home health.     Estimated calorie needs are 1600-1800kcal/day.   Recommend Boost Plus x5 daily bolus via PEG. Flush with 60mL water before and after feedings. Additional 240mL free water via flushes or PO daily.

## 2020-06-26 NOTE — PROGRESS NOTES
Received referral to arrange home health and home infusion services ( for tube feeds) for the clinic. Spoke with  to discuss. They do not express a preference of the provider. Referral sent to [facility] for tube feeds. Referral sent to [facility] who will accept the patient.

## 2020-06-27 PROCEDURE — G0180 MD CERTIFICATION HHA PATIENT: HCPCS | Mod: ,,, | Performed by: INTERNAL MEDICINE

## 2020-06-27 PROCEDURE — G0180 PR HOME HEALTH MD CERTIFICATION: ICD-10-PCS | Mod: ,,, | Performed by: INTERNAL MEDICINE

## 2020-06-30 ENCOUNTER — TELEPHONE (OUTPATIENT)
Dept: HEMATOLOGY/ONCOLOGY | Facility: CLINIC | Age: 70
End: 2020-06-30

## 2020-06-30 ENCOUNTER — CLINICAL SUPPORT (OUTPATIENT)
Dept: HEMATOLOGY/ONCOLOGY | Facility: CLINIC | Age: 70
End: 2020-06-30
Payer: COMMERCIAL

## 2020-06-30 VITALS — BODY MASS INDEX: 20.85 KG/M2 | HEIGHT: 62 IN

## 2020-06-30 DIAGNOSIS — Z71.3 NUTRITIONAL COUNSELING: Primary | ICD-10-CM

## 2020-06-30 DIAGNOSIS — C25.0 MALIGNANT NEOPLASM OF HEAD OF PANCREAS: ICD-10-CM

## 2020-06-30 DIAGNOSIS — R63.4 WEIGHT LOSS, UNINTENTIONAL: ICD-10-CM

## 2020-06-30 DIAGNOSIS — Z93.1 GASTROSTOMY IN PLACE: ICD-10-CM

## 2020-06-30 DIAGNOSIS — R13.10 DYSPHAGIA, UNSPECIFIED TYPE: ICD-10-CM

## 2020-06-30 DIAGNOSIS — C25.0 MALIGNANT NEOPLASM OF HEAD OF PANCREAS: Primary | ICD-10-CM

## 2020-06-30 PROCEDURE — 97802 MEDICAL NUTRITION INDIV IN: CPT | Mod: 95,,, | Performed by: DIETITIAN, REGISTERED

## 2020-06-30 PROCEDURE — 97802 PR MED NUTR THER, 1ST, INDIV, EA 15 MIN: ICD-10-PCS | Mod: 95,,, | Performed by: DIETITIAN, REGISTERED

## 2020-06-30 NOTE — PROGRESS NOTES
The patient location is: home  The chief complaint leading to consultation is: dysphagia, PEG placement, pancreatic cancer     Visit type: audiovisual    Face to Face time with patient: 50 minutes   70 minutes of total time spent on the encounter, which includes face to face time and non-face to face time preparing to see the patient (eg, review of tests), Obtaining and/or reviewing separately obtained history, Documenting clinical information in the electronic or other health record, Independently interpreting results (not separately reported) and communicating results to the patient/family/caregiver, or Care coordination (not separately reported).     Each patient to whom he or she provides medical services by telemedicine is:  (1) informed of the relationship between the physician and patient and the respective role of any other health care provider with respect to management of the patient; and (2) notified that he or she may decline to receive medical services by telemedicine and may withdraw from such care at any time.    Oncology Nutrition Assessment for Medical Nutrition Therapy  Initial Visit    Mercedes Aviles   1950    Referring Provider:  Amanda Singh MD      Reason for Visit: Pt in for education and nutrition counseling     PMHx:   Past Medical History:   Diagnosis Date    Abdominal pain     Anorexia     Fatigue     GERD (gastroesophageal reflux disease)     Hyperlipidemia     Hypertension     Jaundice     Meniere disease     Nausea     Pancreas cancer     Pancreatic insufficiency     Psoriasis     Weakness     Weight loss, unintentional        Nutrition Assessment    This is a 70 y.o.female with a medical diagnosis of unresectable pancreatic cacner undergoing treatment with FOLFIRINOX. She has been experiencing appetite loss, early satiety, and dysphagia and underwent PEG placement last week. EGD did not reveal any concerns with PO intake- she is able to have anything she can  "tolerate, which is currently very little. She is doing the following via PEmL water, 4oz Boost Plus, 30mL water at a time x 3 feedings daily. She has been experiencing diarrhea but has been been improving the past 24 hours. She has Creon but has not been using it. She drinks water and occasional soft drinks by mouth. Has not eaten anything solid in several days. Prior to PEG placement her intake was very poor.   She received her supplies from [facility] and has had two visits from Home Health.   She has to follow a low sodium diet due to Menier's disease.       Weight: most recent weight was 114lb- not weighed since PEG placement   Height:5' 2" (1.575 m)  BMI:Body mass index is 20.85 kg/m².   IBW: Ideal body weight: 50.1 kg (110 lb 7.2 oz)  Adjusted ideal body weight: 50.9 kg (112 lb 3.4 oz)    Usual BW: 145lb  Weight Change: about 30lb loss    Nutrition focused physical findings: very thin, weak, severe muscle wasting     Allergies: Celebrex [celecoxib], Topiramate, Latex, and Simvastatin    Current Medications:    Current Outpatient Medications:     amLODIPine (NORVASC) 5 MG tablet, TAKE 1 TABLET(5 MG) BY MOUTH EVERY DAY (Patient taking differently: Take 5 mg by mouth every morning. ), Disp: 90 tablet, Rfl: 3    atorvastatin (LIPITOR) 40 MG tablet, TAKE 1 TABLET(40 MG) BY MOUTH EVERY DAY (Patient taking differently: Take 40 mg by mouth every evening. ), Disp: 90 tablet, Rfl: 1    betahistine HCl (BETAHISTINE, BULK, MISC), 12 mg by Misc.(Non-Drug; Combo Route) route every evening., Disp: , Rfl:     clonazePAM (KLONOPIN) 0.5 MG tablet, Take 0.5 mg by mouth every evening. , Disp: , Rfl:     cyproheptadine (PERIACTIN) 4 mg tablet, Take 1 tablet (4 mg total) by mouth 4 (four) times daily., Disp: 120 tablet, Rfl: 4    dicyclomine (BENTYL) 10 MG capsule, Take 1 capsule (10 mg total) by mouth 4 (four) times daily. (Patient taking differently: Take 10 mg by mouth 4 (four) times daily. ), Disp: 120 capsule, Rfl: " 0    escitalopram oxalate (LEXAPRO) 5 MG Tab, TAKE 1 TABLET(5 MG) BY MOUTH EVERY DAY, Disp: 30 tablet, Rfl: 11    HYDROmorphone (DILAUDID) 2 MG tablet, Take 1 tablet (2 mg total) by mouth every 3 (three) hours as needed for Pain., Disp: 120 tablet, Rfl: 0    levothyroxine (SYNTHROID) 50 MCG tablet, TAKE 1 TABLET BY MOUTH EVERY DAY (Patient taking differently: Take 50 mcg by mouth before breakfast. TAKE 1 TABLET BY MOUTH EVERY DAY), Disp: 90 tablet, Rfl: 1    lidocaine-prilocaine (EMLA) cream, Apply topically as needed. (Patient not taking: Reported on 6/16/2020), Disp: 30 g, Rfl: 5    lipase-protease-amylase 24,000-76,000-120,000 units (CREON) 24,000-76,000 -120,000 unit capsule, Take 1 capsule by mouth 3 (three) times daily with meals., Disp: 90 capsule, Rfl: 11    metoclopramide HCl (REGLAN) 5 MG tablet, Take 1 tablet (5 mg total) by mouth 3 (three) times daily., Disp: 90 tablet, Rfl: 1    ondansetron (ZOFRAN-ODT) 8 MG TbDL, Take 1 tablet (8 mg total) by mouth every 8 (eight) hours as needed., Disp: 60 tablet, Rfl: 6    pantoprazole (PROTONIX) 40 MG tablet, Take 40 mg by mouth once daily., Disp: , Rfl:     promethazine (PHENERGAN) 25 MG tablet, Take 12.5 mg by mouth every 6 (six) hours as needed for Nausea., Disp: , Rfl:     spironolactone (ALDACTONE) 25 MG tablet, Take 25 mg by mouth every morning. , Disp: , Rfl:     traMADoL (ULTRAM) 50 mg tablet, Take 1 tablet (50 mg total) by mouth every 6 (six) hours as needed for Pain., Disp: 90 tablet, Rfl: 0    Food/medication interactions noted: avoid grapefruit, avoid vitamin/mineral supplements with synthroid     Vitamins/Supplements: Boost Plus via PEG    Labs: Reviewed -Albumin 2.9, bilirubin 1.6, GFR 52    Nutrition Diagnosis    Problem: inadequate protein/energy intake  Etiology (related to): appetite loss  and dysphagia   Signs/Symptoms (as evidenced by): weight loss    Nutrition Intervention    Nutrition Prescription   3173-0143 Kcals (30-35kcal/kg)  78  g protein (1.5g/kg)   2615-3118 mL fluid (30-35mL/kg)    Recommendations:  Had very long discussion with patient and  regarding ways to increase PO intake and PEG tube feeds.   Start with liquids and purees PO   Take Creon with PO intake and tube feeds   Recommend palliative care referral which patient and  are very agreeable to   Increase tube feeding frequency -work up to 4oz x5 feedings daily then increase volume at each feeding by one ounce daily until tolerating 8oz x5 daily    Materials Provided/Reviewed Educated on how to administer medications via PEG and how to take Creon PO mixed in applesauce since she unable to tolerate pills PO. Recommended he call pharmacy to discuss which medications can be crushed     Nutrition Monitoring and Evaluation    Monitor: energy intake, rate/formulation of tube feeding, diet tolerance  and weight    Goals: weight stable or gain     Motivation to change/anticipated barriers: patient has good support, limited by her current symptoms     Follow up In 2-3 weeks     Communication to referring provider/care team: discussed with provider; note available in chart     Counseling time: 30 Minutes    Uma Kate, MPH, RD, , LDN, FAND   [phone]

## 2020-07-01 ENCOUNTER — DOCUMENTATION ONLY (OUTPATIENT)
Dept: HEMATOLOGY/ONCOLOGY | Facility: CLINIC | Age: 70
End: 2020-07-01

## 2020-07-01 ENCOUNTER — LAB VISIT (OUTPATIENT)
Dept: LAB | Facility: HOSPITAL | Age: 70
End: 2020-07-01
Attending: INTERNAL MEDICINE
Payer: COMMERCIAL

## 2020-07-01 DIAGNOSIS — C25.0 MALIGNANT NEOPLASM OF HEAD OF PANCREAS: ICD-10-CM

## 2020-07-01 LAB
ALBUMIN SERPL BCP-MCNC: 2.9 G/DL (ref 3.5–5.2)
ALP SERPL-CCNC: 177 U/L (ref 55–135)
ALT SERPL W/O P-5'-P-CCNC: 11 U/L (ref 10–44)
ANION GAP SERPL CALC-SCNC: 11 MMOL/L (ref 8–16)
AST SERPL-CCNC: 20 U/L (ref 10–40)
BILIRUB SERPL-MCNC: 1.1 MG/DL (ref 0.1–1)
BUN SERPL-MCNC: 29 MG/DL (ref 8–23)
CALCIUM SERPL-MCNC: 9.3 MG/DL (ref 8.7–10.5)
CHLORIDE SERPL-SCNC: 104 MMOL/L (ref 95–110)
CO2 SERPL-SCNC: 25 MMOL/L (ref 23–29)
CREAT SERPL-MCNC: 0.9 MG/DL (ref 0.5–1.4)
ERYTHROCYTE [DISTWIDTH] IN BLOOD BY AUTOMATED COUNT: 15.5 % (ref 11.5–14.5)
EST. GFR  (AFRICAN AMERICAN): >60 ML/MIN/1.73 M^2
EST. GFR  (NON AFRICAN AMERICAN): >60 ML/MIN/1.73 M^2
GLUCOSE SERPL-MCNC: 138 MG/DL (ref 70–110)
HCT VFR BLD AUTO: 41 % (ref 37–48.5)
HGB BLD-MCNC: 13.1 G/DL (ref 12–16)
IMM GRANULOCYTES # BLD AUTO: 0.62 K/UL (ref 0–0.04)
MCH RBC QN AUTO: 29.8 PG (ref 27–31)
MCHC RBC AUTO-ENTMCNC: 32 G/DL (ref 32–36)
MCV RBC AUTO: 93 FL (ref 82–98)
NEUTROPHILS # BLD AUTO: 8.7 K/UL (ref 1.8–7.7)
PLATELET # BLD AUTO: 199 K/UL (ref 150–350)
PMV BLD AUTO: 10.2 FL (ref 9.2–12.9)
POTASSIUM SERPL-SCNC: 2.7 MMOL/L (ref 3.5–5.1)
PROT SERPL-MCNC: 6.8 G/DL (ref 6–8.4)
RBC # BLD AUTO: 4.4 M/UL (ref 4–5.4)
SODIUM SERPL-SCNC: 140 MMOL/L (ref 136–145)
WBC # BLD AUTO: 11.34 K/UL (ref 3.9–12.7)

## 2020-07-01 PROCEDURE — 80053 COMPREHEN METABOLIC PANEL: CPT

## 2020-07-01 PROCEDURE — 36415 COLL VENOUS BLD VENIPUNCTURE: CPT | Mod: PO

## 2020-07-01 PROCEDURE — 85027 COMPLETE CBC AUTOMATED: CPT

## 2020-07-02 ENCOUNTER — OFFICE VISIT (OUTPATIENT)
Dept: HEMATOLOGY/ONCOLOGY | Facility: CLINIC | Age: 70
End: 2020-07-02
Payer: COMMERCIAL

## 2020-07-02 ENCOUNTER — INFUSION (OUTPATIENT)
Dept: INFUSION THERAPY | Facility: HOSPITAL | Age: 70
End: 2020-07-02
Attending: INTERNAL MEDICINE
Payer: COMMERCIAL

## 2020-07-02 ENCOUNTER — TELEPHONE (OUTPATIENT)
Dept: HEMATOLOGY/ONCOLOGY | Facility: CLINIC | Age: 70
End: 2020-07-02

## 2020-07-02 VITALS
SYSTOLIC BLOOD PRESSURE: 96 MMHG | DIASTOLIC BLOOD PRESSURE: 63 MMHG | BODY MASS INDEX: 20.61 KG/M2 | TEMPERATURE: 99 F | HEART RATE: 101 BPM | WEIGHT: 112 LBS | HEIGHT: 62 IN | RESPIRATION RATE: 18 BRPM

## 2020-07-02 VITALS
TEMPERATURE: 99 F | RESPIRATION RATE: 18 BRPM | SYSTOLIC BLOOD PRESSURE: 96 MMHG | HEART RATE: 101 BPM | DIASTOLIC BLOOD PRESSURE: 63 MMHG

## 2020-07-02 DIAGNOSIS — E87.6 HYPOKALEMIA: ICD-10-CM

## 2020-07-02 DIAGNOSIS — C25.0 MALIGNANT NEOPLASM OF HEAD OF PANCREAS: Primary | ICD-10-CM

## 2020-07-02 DIAGNOSIS — G89.3 NEOPLASM RELATED PAIN: Primary | ICD-10-CM

## 2020-07-02 DIAGNOSIS — F41.9 ANXIETY: ICD-10-CM

## 2020-07-02 DIAGNOSIS — R10.9 ABDOMINAL PAIN, UNSPECIFIED ABDOMINAL LOCATION: ICD-10-CM

## 2020-07-02 PROCEDURE — 25000003 PHARM REV CODE 250: Performed by: INTERNAL MEDICINE

## 2020-07-02 PROCEDURE — 1101F PT FALLS ASSESS-DOCD LE1/YR: CPT | Mod: CPTII,S$GLB,, | Performed by: INTERNAL MEDICINE

## 2020-07-02 PROCEDURE — 1125F AMNT PAIN NOTED PAIN PRSNT: CPT | Mod: S$GLB,,, | Performed by: INTERNAL MEDICINE

## 2020-07-02 PROCEDURE — 1125F PR PAIN SEVERITY QUANTIFIED, PAIN PRESENT: ICD-10-PCS | Mod: S$GLB,,, | Performed by: INTERNAL MEDICINE

## 2020-07-02 PROCEDURE — 3008F PR BODY MASS INDEX (BMI) DOCUMENTED: ICD-10-PCS | Mod: CPTII,S$GLB,, | Performed by: INTERNAL MEDICINE

## 2020-07-02 PROCEDURE — 99999 PR PBB SHADOW E&M-EST. PATIENT-LVL IV: ICD-10-PCS | Mod: PBBFAC,,, | Performed by: INTERNAL MEDICINE

## 2020-07-02 PROCEDURE — 3078F PR MOST RECENT DIASTOLIC BLOOD PRESSURE < 80 MM HG: ICD-10-PCS | Mod: CPTII,S$GLB,, | Performed by: INTERNAL MEDICINE

## 2020-07-02 PROCEDURE — 96365 THER/PROPH/DIAG IV INF INIT: CPT

## 2020-07-02 PROCEDURE — 1159F MED LIST DOCD IN RCRD: CPT | Mod: S$GLB,,, | Performed by: INTERNAL MEDICINE

## 2020-07-02 PROCEDURE — 3008F BODY MASS INDEX DOCD: CPT | Mod: CPTII,S$GLB,, | Performed by: INTERNAL MEDICINE

## 2020-07-02 PROCEDURE — 96361 HYDRATE IV INFUSION ADD-ON: CPT

## 2020-07-02 PROCEDURE — 3074F PR MOST RECENT SYSTOLIC BLOOD PRESSURE < 130 MM HG: ICD-10-PCS | Mod: CPTII,S$GLB,, | Performed by: INTERNAL MEDICINE

## 2020-07-02 PROCEDURE — 96366 THER/PROPH/DIAG IV INF ADDON: CPT

## 2020-07-02 PROCEDURE — 3074F SYST BP LT 130 MM HG: CPT | Mod: CPTII,S$GLB,, | Performed by: INTERNAL MEDICINE

## 2020-07-02 PROCEDURE — 3078F DIAST BP <80 MM HG: CPT | Mod: CPTII,S$GLB,, | Performed by: INTERNAL MEDICINE

## 2020-07-02 PROCEDURE — 99215 OFFICE O/P EST HI 40 MIN: CPT | Mod: S$GLB,,, | Performed by: INTERNAL MEDICINE

## 2020-07-02 PROCEDURE — 99999 PR PBB SHADOW E&M-EST. PATIENT-LVL IV: CPT | Mod: PBBFAC,,, | Performed by: INTERNAL MEDICINE

## 2020-07-02 PROCEDURE — 63600175 PHARM REV CODE 636 W HCPCS: Performed by: INTERNAL MEDICINE

## 2020-07-02 PROCEDURE — 99215 PR OFFICE/OUTPT VISIT, EST, LEVL V, 40-54 MIN: ICD-10-PCS | Mod: S$GLB,,, | Performed by: INTERNAL MEDICINE

## 2020-07-02 PROCEDURE — 1101F PR PT FALLS ASSESS DOC 0-1 FALLS W/OUT INJ PAST YR: ICD-10-PCS | Mod: CPTII,S$GLB,, | Performed by: INTERNAL MEDICINE

## 2020-07-02 PROCEDURE — 1159F PR MEDICATION LIST DOCUMENTED IN MEDICAL RECORD: ICD-10-PCS | Mod: S$GLB,,, | Performed by: INTERNAL MEDICINE

## 2020-07-02 RX ORDER — DICYCLOMINE HYDROCHLORIDE 10 MG/5ML
10 SOLUTION ORAL
Qty: 600 ML | Refills: 6 | Status: SHIPPED | OUTPATIENT
Start: 2020-07-02

## 2020-07-02 RX ORDER — HEPARIN 100 UNIT/ML
500 SYRINGE INTRAVENOUS
Status: CANCELLED | OUTPATIENT
Start: 2020-07-08

## 2020-07-02 RX ORDER — POTASSIUM CHLORIDE 7.45 MG/ML
10 INJECTION INTRAVENOUS
Status: CANCELLED | OUTPATIENT
Start: 2020-07-02

## 2020-07-02 RX ORDER — ATROPINE SULFATE 0.4 MG/ML
0.4 INJECTION, SOLUTION ENDOTRACHEAL; INTRAMEDULLARY; INTRAMUSCULAR; INTRAVENOUS; SUBCUTANEOUS ONCE AS NEEDED
Status: CANCELLED | OUTPATIENT
Start: 2020-07-06

## 2020-07-02 RX ORDER — HEPARIN 100 UNIT/ML
500 SYRINGE INTRAVENOUS
Status: CANCELLED | OUTPATIENT
Start: 2020-07-06

## 2020-07-02 RX ORDER — EPINEPHRINE 0.3 MG/.3ML
0.3 INJECTION SUBCUTANEOUS ONCE AS NEEDED
Status: CANCELLED | OUTPATIENT
Start: 2020-07-06

## 2020-07-02 RX ORDER — SODIUM CHLORIDE 0.9 % (FLUSH) 0.9 %
10 SYRINGE (ML) INJECTION
Status: CANCELLED | OUTPATIENT
Start: 2020-07-02

## 2020-07-02 RX ORDER — LORAZEPAM/0.9% SODIUM CHLORIDE 100MG/0.1L
2 PLASTIC BAG, INJECTION (ML) INTRAVENOUS
Status: CANCELLED | OUTPATIENT
Start: 2020-07-02

## 2020-07-02 RX ORDER — POTASSIUM CHLORIDE 7.45 MG/ML
10 INJECTION INTRAVENOUS
Status: CANCELLED
Start: 2020-07-02

## 2020-07-02 RX ORDER — DIPHENHYDRAMINE HYDROCHLORIDE 50 MG/ML
50 INJECTION INTRAMUSCULAR; INTRAVENOUS ONCE AS NEEDED
Status: CANCELLED | OUTPATIENT
Start: 2020-07-06

## 2020-07-02 RX ORDER — SODIUM CHLORIDE 0.9 % (FLUSH) 0.9 %
10 SYRINGE (ML) INJECTION
Status: CANCELLED | OUTPATIENT
Start: 2020-07-06

## 2020-07-02 RX ORDER — POTASSIUM CHLORIDE 7.45 MG/ML
10 INJECTION INTRAVENOUS
Status: COMPLETED | OUTPATIENT
Start: 2020-07-02 | End: 2020-07-02

## 2020-07-02 RX ORDER — HEPARIN 100 UNIT/ML
500 SYRINGE INTRAVENOUS
Status: CANCELLED | OUTPATIENT
Start: 2020-07-02

## 2020-07-02 RX ORDER — LIDOCAINE 50 MG/G
1 PATCH TOPICAL DAILY
Qty: 30 PATCH | Refills: 0 | Status: SHIPPED | OUTPATIENT
Start: 2020-07-02 | End: 2021-07-02

## 2020-07-02 RX ORDER — SODIUM CHLORIDE 0.9 % (FLUSH) 0.9 %
10 SYRINGE (ML) INJECTION
Status: CANCELLED | OUTPATIENT
Start: 2020-07-08

## 2020-07-02 RX ORDER — POTASSIUM CHLORIDE 20 MEQ/15ML
40 SOLUTION ORAL DAILY
Qty: 3800 ML | Refills: 6 | Status: SHIPPED | OUTPATIENT
Start: 2020-07-02

## 2020-07-02 RX ADMIN — POTASSIUM CHLORIDE 10 MEQ: 7.46 INJECTION, SOLUTION INTRAVENOUS at 12:07

## 2020-07-02 RX ADMIN — SODIUM CHLORIDE 1000 ML: 0.9 INJECTION, SOLUTION INTRAVENOUS at 12:07

## 2020-07-02 RX ADMIN — POTASSIUM CHLORIDE 10 MEQ: 7.46 INJECTION, SOLUTION INTRAVENOUS at 02:07

## 2020-07-02 RX ADMIN — POTASSIUM CHLORIDE 10 MEQ: 7.46 INJECTION, SOLUTION INTRAVENOUS at 11:07

## 2020-07-02 RX ADMIN — POTASSIUM CHLORIDE 10 MEQ: 7.46 INJECTION, SOLUTION INTRAVENOUS at 01:07

## 2020-07-02 NOTE — Clinical Note
Can you see if palliative will do virtual with her. Also needs to see Psychology and see if they can do a virtual with her.

## 2020-07-02 NOTE — Clinical Note
Patric Stiles,  Patient uses PEG tube for meds. Can you look at her list and see if we can crush all her meds and also can she take them at the same time via PEG tube

## 2020-07-02 NOTE — TELEPHONE ENCOUNTER
----- Message from Amanda Singh MD sent at 7/2/2020 10:50 AM CDT -----  Schedule DC pump and neulasta on 7/8/2020 on [facility].Schedule CBC,CMP and see me in 2 weeks live only on [facility] and for FOLFOX on [facility] only. DC pump and neulasta on day 3.

## 2020-07-02 NOTE — Clinical Note
Schedule DC pump and neulasta on 7/8/2020 on [facility].    Schedule CBC,CMP and see me in 2 weeks live only on [facility] and for FOLFOX on [facility] only. DC pump and neulasta on day 3.

## 2020-07-02 NOTE — Clinical Note
Did Tri get back on whether her meds can all be crushed and put into PEG at same time. If yes, please let  know

## 2020-07-02 NOTE — PLAN OF CARE
Per ZAC Stoner RN/Dr. Singh, patient is here for K+/IVF only.  Tolerated K+ infusions well.  No reactions noted.  No questions or concerns at this time. Left unit in NAD.

## 2020-07-02 NOTE — PROGRESS NOTES
Subjective:       Patient ID: Mercedes Aviles is a 70 y.o. female.    Chief Complaint: Malignant neoplasm of head of pancreas  Mrs. Aviles is a 70 year old female with HTN, HPL,. Menier's disease who underwent a Nissen Fundoplication and laparoscopic cholecystectomy on 4/21/2020. She presented to ED on 4/25/2020 with nausea and and temp of 99.4. Right upper quadrant ultrasound did not reveal ductal dilation or stones.  She then returned back to ED on 5/8/2020 with jaundice and was noted to have lab with tbili 12, ,  Alkphos 1641.  She underwent Ct scans on 5/8/2020 which revealed soft tissue infiltrative mass, measuring 5.3 cm X 2.8 X 4.3 cm, centered at the ute hepatis that appears to surround the CBD producing CBD as well as intrahepatic dilation. There is encasement of the SMA and celiac branches. It does not appear to arise from the pancreas but infiltrative pancreas neoplasm cannot be excluded. Lymph nodes noted in rigt upper quadrant/ute hepatis. Also stomach wall thickening, venous structures within left hepatic lobe are diminutive raising the possibility of stricture due to infiltrative spread of neoplasm or possible arterial thrombus. Focus noted in right ovary measuring 1.5 cm, appears non specific.  She underwent EUS on 5/11/2020 revealed pancreatic parenchymal abnormalities in the genu of the pancreas, these include 2X 3 cm area / mass and diffuse dilatation of intra hepatic bile ducts. PAth from this procedure revealed atypical groups of ductal cells in a background of chronic pancreatitis.     She then underwent MRI MRCP on 5/12/2020, which revealed biliary duct dilatation with abrupt severe narrowing or occlusion of CBC near the ampulla of vater at the level of pancreatic head. Also infiltrative soft tissue within ute hepatis and retroperitoneum surrounding the celiac and SMA remains nonspecific, differential includes pancreas versus ampullary neoplasm, significant narrowing of  portal vein within the infiltrative mass in ute hepatis, small amount of ascitis.  ERCP on 5/13/2020 and CHD stricture was noted which was brushed and stent placed, also stent placed in PD. Path was negative for malignancy     She then underwent a repeat EUS on 5/20/2020 and was noted to have an abnormal area of poorly defined, hypoechoic, soft tissue density in the area of celiac trunk (actually encasing the vessel) measuring about 2.5 cm.   She also underwent ERCP on same day and was noted to have a single stricture in the CHD which was brushed X 3 and stent was placed.   This final FNA from above reveals pancreatic ductal adenocarcinoma with perineural invasion.     Reviewed case in Upper GI conference and consensus is that her tumor is unresectable and plan was to proceed with palliative chemo    .Pham started FOLFOX 2 weeks ago, 2 days after her chemo she started with fatigue, dry heaves, diarrhea X lasted 3 days.   She got PEG tube p,aced on 6/25/2020. She has pain at the PEG tube site.    She is getting 5 oz of boost and water three times a day per , plan is to increase as she tolerates   She is unable to eat anything as the site of food causes nausea.    Review of Systems   Constitutional: Positive for appetite change and unexpected weight change. Negative for fatigue.   HENT: Negative for mouth sores.    Eyes: Negative for visual disturbance.   Respiratory: Negative for cough and shortness of breath.    Cardiovascular: Negative for chest pain.   Gastrointestinal: Positive for abdominal pain, change in bowel habit, nausea and change in bowel habit. Negative for diarrhea.   Genitourinary: Negative for frequency.   Musculoskeletal: Negative for back pain.   Integumentary:  Negative for rash.   Neurological: Negative for headaches.   Hematological: Negative for adenopathy.   Psychiatric/Behavioral: The patient is not nervous/anxious.    All other systems reviewed and are negative.        Objective:       Physical Exam  Vitals signs reviewed.   Constitutional:       Appearance: She is well-developed. She is cachectic. She is ill-appearing and toxic-appearing.   HENT:      Mouth/Throat:      Pharynx: No oropharyngeal exudate.   Cardiovascular:      Rate and Rhythm: Normal rate.      Heart sounds: Normal heart sounds.   Pulmonary:      Effort: Pulmonary effort is normal.      Breath sounds: Normal breath sounds. No wheezing.   Abdominal:      General: Bowel sounds are normal.      Palpations: Abdomen is soft.      Tenderness: There is no abdominal tenderness.   Musculoskeletal:         General: No tenderness.   Lymphadenopathy:      Cervical: No cervical adenopathy.   Skin:     General: Skin is warm and dry.      Findings: No rash.   Neurological:      Mental Status: She is alert and oriented to person, place, and time.      Coordination: Coordination normal.   Psychiatric:         Thought Content: Thought content normal.         Judgment: Judgment normal.           LBA:  WBC   Date Value Ref Range Status   07/01/2020 11.34 3.90 - 12.70 K/uL Final     Hemoglobin   Date Value Ref Range Status   07/01/2020 13.1 12.0 - 16.0 g/dL Final     Hematocrit   Date Value Ref Range Status   07/01/2020 41.0 37.0 - 48.5 % Final     Platelets   Date Value Ref Range Status   07/01/2020 199 150 - 350 K/uL Final     Gran # (ANC)   Date Value Ref Range Status   07/01/2020 8.7 (H) 1.8 - 7.7 K/uL Final     Comment:     The ANC is based on a white cell differential from an   automated cell counter. It has not been microscopically   reviewed for the presence of abnormal cells. Clinical   correlation is required.         Chemistry        Component Value Date/Time     07/01/2020 1515    K 2.7 (LL) 07/01/2020 1515     07/01/2020 1515    CO2 25 07/01/2020 1515    BUN 29 (H) 07/01/2020 1515    CREATININE 0.9 07/01/2020 1515     (H) 07/01/2020 1515        Component Value Date/Time    CALCIUM 9.3 07/01/2020 1515    ALKPHOS  177 (H) 07/01/2020 1515    AST 20 07/01/2020 1515    ALT 11 07/01/2020 1515    BILITOT 1.1 (H) 07/01/2020 1515    ESTGFRAFRICA >60 07/01/2020 1515    EGFRNONAA >60 07/01/2020 1515          Assessment:       1. Malignant neoplasm of head of pancreas    2. Hypokalemia    3. Abdominal pain, unspecified abdominal location    4. Anxiety        Plan:        1,2,3. She will proceed with cycle 2 of chemo with FOLFOX alone, too weak to add Irinotecan and will return in 2 weeks for next cycle  Replace potassium intravenously and also sent script for liquid potassium. Reviewed nutrition and HH recs. She now has a PEG tube, has seen nutrition,  is slowly working towards goal nutrition.   They are scheduled to see palliative care next week, virtual visit, advised them to keep the appointment, also offered psychology.   She is not coping with her diagnosis and is having a very difficult time as is her , which is understandable, hence offered them both psychology and palliative help.      Above care plan was discussed with patient and accompanying  and all questions were addressed to their satisfaction

## 2020-07-04 ENCOUNTER — PATIENT MESSAGE (OUTPATIENT)
Dept: GASTROENTEROLOGY | Facility: CLINIC | Age: 70
End: 2020-07-04

## 2020-07-06 ENCOUNTER — INFUSION (OUTPATIENT)
Dept: INFUSION THERAPY | Facility: HOSPITAL | Age: 70
End: 2020-07-06
Attending: INTERNAL MEDICINE
Payer: COMMERCIAL

## 2020-07-06 VITALS
DIASTOLIC BLOOD PRESSURE: 58 MMHG | RESPIRATION RATE: 18 BRPM | HEART RATE: 92 BPM | SYSTOLIC BLOOD PRESSURE: 105 MMHG | TEMPERATURE: 98 F

## 2020-07-06 DIAGNOSIS — C25.0 MALIGNANT NEOPLASM OF HEAD OF PANCREAS: ICD-10-CM

## 2020-07-06 DIAGNOSIS — C25.9 PANCREATIC CANCER: Primary | ICD-10-CM

## 2020-07-06 PROCEDURE — 96413 CHEMO IV INFUSION 1 HR: CPT

## 2020-07-06 PROCEDURE — 96367 TX/PROPH/DG ADDL SEQ IV INF: CPT

## 2020-07-06 PROCEDURE — 25000003 PHARM REV CODE 250: Performed by: INTERNAL MEDICINE

## 2020-07-06 PROCEDURE — 96416 CHEMO PROLONG INFUSE W/PUMP: CPT

## 2020-07-06 PROCEDURE — 96368 THER/DIAG CONCURRENT INF: CPT

## 2020-07-06 PROCEDURE — 63600175 PHARM REV CODE 636 W HCPCS: Performed by: INTERNAL MEDICINE

## 2020-07-06 PROCEDURE — 96415 CHEMO IV INFUSION ADDL HR: CPT

## 2020-07-06 PROCEDURE — 96375 TX/PRO/DX INJ NEW DRUG ADDON: CPT

## 2020-07-06 RX ORDER — HYDROMORPHONE HYDROCHLORIDE 2 MG/ML
1 INJECTION, SOLUTION INTRAMUSCULAR; INTRAVENOUS; SUBCUTANEOUS
Status: COMPLETED | OUTPATIENT
Start: 2020-07-06 | End: 2020-07-06

## 2020-07-06 RX ADMIN — FLUOROURACIL 3575 MG: 50 INJECTION, SOLUTION INTRAVENOUS at 01:07

## 2020-07-06 RX ADMIN — OXALIPLATIN 127 MG: 5 INJECTION, SOLUTION, CONCENTRATE INTRAVENOUS at 11:07

## 2020-07-06 RX ADMIN — SODIUM CHLORIDE 1000 ML: 0.9 INJECTION, SOLUTION INTRAVENOUS at 10:07

## 2020-07-06 RX ADMIN — LEUCOVORIN CALCIUM 595 MG: 350 INJECTION, POWDER, LYOPHILIZED, FOR SOLUTION INTRAMUSCULAR; INTRAVENOUS at 11:07

## 2020-07-06 RX ADMIN — PALONOSETRON HYDROCHLORIDE: 0.25 INJECTION INTRAVENOUS at 10:07

## 2020-07-06 RX ADMIN — HYDROMORPHONE HYDROCHLORIDE 1 MG: 2 INJECTION, SOLUTION INTRAMUSCULAR; INTRAVENOUS; SUBCUTANEOUS at 10:07

## 2020-07-06 NOTE — PLAN OF CARE
Pt arrived to unit. Pt had peg tube placed 1 week ago. Serous drainage noted around insertion site. No yellow or green drainage noted. Some redness at insertion site. Pt and pt's  state this has been a source of pain over the weekend, usually with movement.  Pt only took tylenol at home. Pt cleared for chemo, but Dr. Singh notified of pt's status. Will proceed with chemo. Pt instructed to monitor site and notify Dr. Mae of any changes or issues with the peg tube. Pt given dilaudid for pain rated 9 and was relieved to 0. Tolerated IVF and premeds. Tolerated Oxaliplatin and Leucovorin. 5FU per pump initiated. No reactions noted. Reinforced cold precautions and pt and pt's  verbalized understanding. AVS given to pt, including return appt for pump dc on 7/8. Pt in wheelchair, discharged from unit, accompanied by RN and . No distress or pain noted.   Loss

## 2020-07-08 ENCOUNTER — INFUSION (OUTPATIENT)
Dept: INFUSION THERAPY | Facility: HOSPITAL | Age: 70
End: 2020-07-08
Attending: INTERNAL MEDICINE
Payer: COMMERCIAL

## 2020-07-08 VITALS
SYSTOLIC BLOOD PRESSURE: 106 MMHG | RESPIRATION RATE: 17 BRPM | TEMPERATURE: 98 F | OXYGEN SATURATION: 98 % | HEART RATE: 112 BPM | DIASTOLIC BLOOD PRESSURE: 69 MMHG

## 2020-07-08 DIAGNOSIS — G89.3 NEOPLASM RELATED PAIN: Primary | ICD-10-CM

## 2020-07-08 DIAGNOSIS — C25.0 MALIGNANT NEOPLASM OF HEAD OF PANCREAS: Primary | ICD-10-CM

## 2020-07-08 PROCEDURE — 96374 THER/PROPH/DIAG INJ IV PUSH: CPT

## 2020-07-08 PROCEDURE — 63600175 PHARM REV CODE 636 W HCPCS: Performed by: INTERNAL MEDICINE

## 2020-07-08 PROCEDURE — A4216 STERILE WATER/SALINE, 10 ML: HCPCS | Performed by: INTERNAL MEDICINE

## 2020-07-08 PROCEDURE — 25000003 PHARM REV CODE 250: Performed by: INTERNAL MEDICINE

## 2020-07-08 PROCEDURE — 96377 APPLICATON ON-BODY INJECTOR: CPT | Mod: 59

## 2020-07-08 RX ORDER — HEPARIN 100 UNIT/ML
500 SYRINGE INTRAVENOUS
Status: DISCONTINUED | OUTPATIENT
Start: 2020-07-08 | End: 2020-07-08 | Stop reason: HOSPADM

## 2020-07-08 RX ORDER — MORPHINE SULFATE 20 MG/ML
10 SOLUTION ORAL EVERY 4 HOURS PRN
Qty: 118 ML | Refills: 0 | Status: SHIPPED | OUTPATIENT
Start: 2020-07-08 | End: 2020-07-17 | Stop reason: SDUPTHER

## 2020-07-08 RX ORDER — HYDROMORPHONE HYDROCHLORIDE 2 MG/ML
1 INJECTION, SOLUTION INTRAMUSCULAR; INTRAVENOUS; SUBCUTANEOUS
Status: COMPLETED | OUTPATIENT
Start: 2020-07-08 | End: 2020-07-08

## 2020-07-08 RX ORDER — SODIUM CHLORIDE 0.9 % (FLUSH) 0.9 %
10 SYRINGE (ML) INJECTION
Status: DISCONTINUED | OUTPATIENT
Start: 2020-07-08 | End: 2020-07-08 | Stop reason: HOSPADM

## 2020-07-08 RX ADMIN — PEGFILGRASTIM 6 MG: KIT SUBCUTANEOUS at 11:07

## 2020-07-08 RX ADMIN — HYDROMORPHONE HYDROCHLORIDE 1 MG: 2 INJECTION, SOLUTION INTRAMUSCULAR; INTRAVENOUS; SUBCUTANEOUS at 11:07

## 2020-07-08 RX ADMIN — Medication 10 ML: at 12:07

## 2020-07-08 RX ADMIN — HEPARIN 500 UNITS: 100 SYRINGE at 12:07

## 2020-07-08 NOTE — PLAN OF CARE
Pt arrived to unit. VSS. Pt afebrile. Pain rated 9 to peg tub site. Last BM was this am, very loose stool. No increased redness at peg insertion site. Still draining thick, light yellow drainage. Does not appear to be Boost or stomach contents leaking from site.Pt and pt's  informed to reach out to Dr. Mae before the weekend as this is her main cause of pain. Dr. Singh notified  of patient's pain and will give dilaudid per order. 5FU per pump completed. Port flushed. On body Neulasta applied to left arm. Pt states pain did decrease to 4-5. AVS given to pt. Pt assisted to wheelchair, discharged from unit with . No distress noted.

## 2020-07-11 ENCOUNTER — PATIENT MESSAGE (OUTPATIENT)
Dept: HEMATOLOGY/ONCOLOGY | Facility: CLINIC | Age: 70
End: 2020-07-11

## 2020-07-13 ENCOUNTER — DOCUMENTATION ONLY (OUTPATIENT)
Dept: HEMATOLOGY/ONCOLOGY | Facility: CLINIC | Age: 70
End: 2020-07-13

## 2020-07-13 DIAGNOSIS — L03.90 CELLULITIS, UNSPECIFIED CELLULITIS SITE: Primary | ICD-10-CM

## 2020-07-13 DIAGNOSIS — Z93.1 GASTROSTOMY IN PLACE: ICD-10-CM

## 2020-07-13 DIAGNOSIS — C25.0 MALIGNANT NEOPLASM OF HEAD OF PANCREAS: Primary | ICD-10-CM

## 2020-07-13 DIAGNOSIS — R63.4 WEIGHT LOSS, UNINTENTIONAL: ICD-10-CM

## 2020-07-13 DIAGNOSIS — R13.10 DYSPHAGIA, UNSPECIFIED TYPE: ICD-10-CM

## 2020-07-13 RX ORDER — DOXYCYCLINE HYCLATE 100 MG
100 TABLET ORAL 2 TIMES DAILY
COMMUNITY
End: 2020-07-13 | Stop reason: SDUPTHER

## 2020-07-13 RX ORDER — DOXYCYCLINE HYCLATE 100 MG
100 TABLET ORAL 2 TIMES DAILY
Qty: 28 TABLET | Refills: 0 | Status: SHIPPED | OUTPATIENT
Start: 2020-07-13

## 2020-07-13 NOTE — PROGRESS NOTES
Patient has been having ongoing problems with abdominal cramping and diarrhea following tube feeds now that she is over 4oz per feeding.   Cramps and diarrhea resolved when tube feedings were held. Patient is likely intolerant to Boost as a tube feed. Discussed with Dr. Singh. Will change to Impact Peptide 1.5 x 5 cans daily.

## 2020-07-13 NOTE — TELEPHONE ENCOUNTER
"----- Message from Brightdarwinbruce Jourdan sent at 7/13/2020 12:57 PM CDT -----  Consult/Advisory:    Name Of Caller: Diana   Relationship to the Pt?: Home Health Nurse   Contact Preference?: [phone]    Provider Name: Dr Singh     What is the nature of the call?:  Pt has redness around the belly button area with no pain but hot to the touch. Please contact to discuss    Additional Notes:  "Thank you for all that you do for our patients'"           "

## 2020-07-13 NOTE — TELEPHONE ENCOUNTER
Spoke with  nurse.  She notes at the umbilicus a red spot, non pruritic, no sores, no drainage. No pain at site. Feeding tube is in LUQ. She will send photo to nurse.  No fever/no chills.  Spoke with dr garcia---allergies reviewed  She will send doxycycline 100mg bid     nurse informed.

## 2020-07-14 ENCOUNTER — CLINICAL SUPPORT (OUTPATIENT)
Dept: PALLIATIVE MEDICINE | Facility: CLINIC | Age: 70
End: 2020-07-14
Payer: COMMERCIAL

## 2020-07-14 DIAGNOSIS — R52 PAIN: ICD-10-CM

## 2020-07-14 DIAGNOSIS — R63.0 ANOREXIA: ICD-10-CM

## 2020-07-14 DIAGNOSIS — Z51.5 ENCOUNTER FOR PALLIATIVE CARE: ICD-10-CM

## 2020-07-14 DIAGNOSIS — C25.0 MALIGNANT NEOPLASM OF HEAD OF PANCREAS: ICD-10-CM

## 2020-07-14 DIAGNOSIS — Z71.89 COUNSELING REGARDING ADVANCE DIRECTIVES AND GOALS OF CARE: ICD-10-CM

## 2020-07-14 DIAGNOSIS — R11.0 NAUSEA: ICD-10-CM

## 2020-07-14 DIAGNOSIS — R19.7 DIARRHEA, UNSPECIFIED TYPE: Primary | ICD-10-CM

## 2020-07-14 DIAGNOSIS — F43.21 SITUATIONAL DEPRESSION: ICD-10-CM

## 2020-07-14 PROCEDURE — 99204 PR OFFICE/OUTPT VISIT, NEW, LEVL IV, 45-59 MIN: ICD-10-PCS | Mod: 95,,, | Performed by: CLINICAL NURSE SPECIALIST

## 2020-07-14 PROCEDURE — 99204 OFFICE O/P NEW MOD 45 MIN: CPT | Mod: 95,,, | Performed by: CLINICAL NURSE SPECIALIST

## 2020-07-14 PROCEDURE — 99497 ADVNCD CARE PLAN 30 MIN: CPT | Mod: 95,,, | Performed by: CLINICAL NURSE SPECIALIST

## 2020-07-14 PROCEDURE — 99497 PR ADVNCD CARE PLAN 30 MIN: ICD-10-PCS | Mod: 95,,, | Performed by: CLINICAL NURSE SPECIALIST

## 2020-07-14 RX ORDER — ESCITALOPRAM OXALATE 5 MG/1
5 TABLET ORAL NIGHTLY
Qty: 30 TABLET | Refills: 11 | Status: SHIPPED | OUTPATIENT
Start: 2020-07-14 | End: 2020-07-29 | Stop reason: CLARIF

## 2020-07-14 NOTE — PATIENT INSTRUCTIONS
What Is Palliative Care?  Palliative care is a way to improve quality of life for someone who is being treated for a serious illness. To palliate means to ease the symptoms of an illness. Palliative care providers are experts in easing symptoms that cause distress. These may include pain, nausea, vomiting, anxiety, constipation, sleeping, and breathing problems. The people who are being treated and their loved ones are given emotional and spiritual support. Palliative care is given at the same time as traditional medical care. Active treatment for the illness does not stop.     Both the person receiving treatment and family members help direct the plan of care with the palliative care team.   Goals of palliative care  · Easing symptoms that cause distress. The main goal of palliative care is to ease symptoms. Symptoms may affect a persons ability to eat, be active, or spend time with others. Medicines and other methods are used. This gives the person a better quality of life while the illness is being treated.  · Coordinating care. This helps to make sure that each care provider is aware of the goals of care. Communication is done on a regular basis among all team members to make sure that the care goals are met.  · Meeting emotional and spiritual needs. The care team helps both the person being treated and family members cope with stress, depression, anxiety, and other issues. They can set up meetings with a counselor or  as desired.  · Giving information and helping with decisions. Care providers can help people and their families get the information they need. They can also help when care decisions need to be made.  · Helping create an advance care plan. This is a series of legal documents that note a persons wishes for their future healthcare. It helps to make sure that if people cant speak for themselves, their wishes can still be carried out. The documents vary by state.  Working with  your palliative care team  Palliative care is given by a team of people who focus on the physical, emotional, and psychosocial aspects of advanced illness. The team may include a palliative care provider or nurse, , pharmacist, dietitian, counselor, , and others. To get the most of palliative care, both the person and his or her loved ones have a role.  What a person who is receiving medical treatment can do  Tell your healthcare provider you are thinking about palliative care. Ask what palliative services are available in your area.  To ensure the best care, learn what you can about your illness and the goals of your care. If you are having pain and other symptoms due to a serious illness, ask your healthcare provider for a palliative care referral.  Treating these symptoms is best for your health and quality of life. If you need support in other ways, speak up. The care team is there to help you get what you need.  What a family member can do  Talk with the palliative care team often. Do your best to understand your loved ones illness and goals of care. When decisions need to be made, act on your loved ones wishes. And if you have a concern or question, speak up. You can help the team make sure that your loved one has the best quality of life possible.  Date Last Reviewed: 3/1/2017  © 1347-5866 [employer]. [address] [place] [address], [place] [address]. All rights reserved. This information is not intended as a substitute for professional medical care. Always follow your healthcare professional's instructions.

## 2020-07-15 ENCOUNTER — CLINICAL SUPPORT (OUTPATIENT)
Dept: HEMATOLOGY/ONCOLOGY | Facility: CLINIC | Age: 70
End: 2020-07-15
Payer: COMMERCIAL

## 2020-07-15 ENCOUNTER — TELEPHONE (OUTPATIENT)
Dept: HEMATOLOGY/ONCOLOGY | Facility: CLINIC | Age: 70
End: 2020-07-15

## 2020-07-15 DIAGNOSIS — Z71.3 NUTRITIONAL COUNSELING: Primary | ICD-10-CM

## 2020-07-15 DIAGNOSIS — C25.0 MALIGNANT NEOPLASM OF HEAD OF PANCREAS: ICD-10-CM

## 2020-07-15 DIAGNOSIS — R63.4 WEIGHT LOSS, UNINTENTIONAL: ICD-10-CM

## 2020-07-15 PROCEDURE — 97802 PR MED NUTR THER, 1ST, INDIV, EA 15 MIN: ICD-10-PCS | Mod: 95,,, | Performed by: DIETITIAN, REGISTERED

## 2020-07-15 PROCEDURE — 97802 MEDICAL NUTRITION INDIV IN: CPT | Mod: 95,,, | Performed by: DIETITIAN, REGISTERED

## 2020-07-15 NOTE — PROGRESS NOTES
The patient location is: home  The chief complaint leading to consultation is: dysphagia, PEG placement, pancreatic cancer     Visit type: audiovisual    Face to Face time with patient: 15 minutes   30 minutes of total time spent on the encounter, which includes face to face time and non-face to face time preparing to see the patient (eg, review of tests), Obtaining and/or reviewing separately obtained history, Documenting clinical information in the electronic or other health record, Independently interpreting results (not separately reported) and communicating results to the patient/family/caregiver, or Care coordination (not separately reported).     Each patient to whom he or she provides medical services by telemedicine is:  (1) informed of the relationship between the physician and patient and the respective role of any other health care provider with respect to management of the patient; and (2) notified that he or she may decline to receive medical services by telemedicine and may withdraw from such care at any time.    Oncology Nutrition Assessment for Medical Nutrition Therapy  Follow-up Visit    Mercedes Aviles   1950    Referring Provider:  No ref. provider found      Reason for Visit: Pt in for education and nutrition counseling     PMHx:   Past Medical History:   Diagnosis Date    Abdominal pain     Anorexia     Anxiety     Depression     Fatigue     GERD (gastroesophageal reflux disease)     Hyperlipidemia     Hypertension     Jaundice     Meniere disease     Nausea     Pancreas cancer     Pancreatic insufficiency     Psoriasis     Weakness     Weight loss, unintentional        Nutrition Assessment    This is a 70 y.o.female with a medical diagnosis of unresectable pancreatic cacner undergoing treatment with FOLFOX (no longer on FOLFIRINOX).  She has made some progress in the past few days. She is using a 50/50 mix of Boost and water via PEG. Tolerating this much better and  diarrhea has resolved. However this is not ideal since her feedings are severely diluted and she is no where close to meeting nutritional needs. Working with social work and [employer] regarding coverage for Impact Peptide 1.5 which should be better tolerated at full volume without dilution.  Patient and  met with palliative care yesterday and report this was very helpful particularly for medication management. She has been started back on anxiety medication which she used previously- this may help with her gag reflex.     Weight: most recent weight was about 112lb    Height:   BMI:There is no height or weight on file to calculate BMI.   IBW: Ideal body weight: 50.1 kg (110 lb 7.2 oz)  Adjusted ideal body weight: 50.4 kg (111 lb 1.1 oz)    Usual BW: 145lb  Weight Change: about 30lb loss    Nutrition focused physical findings: very thin, weak, severe muscle wasting     Allergies: Celebrex [celecoxib], Topiramate, Latex, and Simvastatin    Current Medications:    Current Outpatient Medications:     amLODIPine (NORVASC) 5 MG tablet, TAKE 1 TABLET(5 MG) BY MOUTH EVERY DAY (Patient taking differently: Take 5 mg by mouth every morning. ), Disp: 90 tablet, Rfl: 3    atorvastatin (LIPITOR) 40 MG tablet, TAKE 1 TABLET(40 MG) BY MOUTH EVERY DAY (Patient not taking: No sig reported), Disp: 90 tablet, Rfl: 1    betahistine HCl (BETAHISTINE, BULK, MISC), 12 mg by Misc.(Non-Drug; Combo Route) route every evening., Disp: , Rfl:     clonazePAM (KLONOPIN) 0.5 MG tablet, Take 0.5 mg by mouth every evening. , Disp: , Rfl:     cyproheptadine (PERIACTIN) 4 mg tablet, Take 1 tablet (4 mg total) by mouth 4 (four) times daily., Disp: 120 tablet, Rfl: 4    dicyclomine (BENTYL) 10 mg/5 mL syrup, Take 5 mLs (10 mg total) by mouth 4 (four) times daily before meals and nightly., Disp: 600 mL, Rfl: 6    doxycycline (VIBRA-TABS) 100 MG tablet, Take 1 tablet (100 mg total) by mouth 2 (two) times daily., Disp: 28 tablet, Rfl: 0     escitalopram oxalate (LEXAPRO) 5 MG Tab, Take 1 tablet (5 mg total) by mouth nightly., Disp: 30 tablet, Rfl: 11    HYDROmorphone (DILAUDID) 2 MG tablet, Take 1 tablet (2 mg total) by mouth every 3 (three) hours as needed for Pain. (Patient not taking: Reported on 7/14/2020), Disp: 120 tablet, Rfl: 0    levothyroxine (SYNTHROID) 50 MCG tablet, TAKE 1 TABLET BY MOUTH EVERY DAY (Patient taking differently: Take 50 mcg by mouth before breakfast. TAKE 1 TABLET BY MOUTH EVERY DAY), Disp: 90 tablet, Rfl: 1    lidocaine (LIDODERM) 5 %, Place 1 patch onto the skin once daily. Remove & Discard patch within 12 hours or as directed by MD (Patient not taking: Reported on 7/14/2020), Disp: 30 patch, Rfl: 0    lidocaine-prilocaine (EMLA) cream, Apply topically as needed., Disp: 30 g, Rfl: 5    lipase-protease-amylase 24,000-76,000-120,000 units (CREON) 24,000-76,000 -120,000 unit capsule, Take 1 capsule by mouth 3 (three) times daily with meals. (Patient not taking: Reported on 7/14/2020), Disp: 90 capsule, Rfl: 11    metoclopramide HCl (REGLAN) 5 MG tablet, Take 1 tablet (5 mg total) by mouth 3 (three) times daily. (Patient not taking: Reported on 7/14/2020), Disp: 90 tablet, Rfl: 1    morphine 100 mg/5 mL (20 mg/mL) concentrated solution, Take 0.5 mLs (10 mg total) by mouth every 4 (four) hours as needed for Pain., Disp: 118 mL, Rfl: 0    ondansetron (ZOFRAN-ODT) 8 MG TbDL, Take 1 tablet (8 mg total) by mouth every 8 (eight) hours as needed., Disp: 60 tablet, Rfl: 6    pantoprazole (PROTONIX) 40 MG tablet, Take 40 mg by mouth once daily., Disp: , Rfl:     potassium chloride 10% (KAYCIEL) 20 mEq/15 mL oral solution, Take 30 mLs (40 mEq total) by mouth once daily., Disp: 3800 mL, Rfl: 6    promethazine (PHENERGAN) 25 MG tablet, Take 12.5 mg by mouth every 6 (six) hours as needed for Nausea., Disp: , Rfl:     spironolactone (ALDACTONE) 25 MG tablet, Take 25 mg by mouth every morning. , Disp: , Rfl:     traMADoL  (ULTRAM) 50 mg tablet, Take 1 tablet (50 mg total) by mouth every 6 (six) hours as needed for Pain., Disp: 90 tablet, Rfl: 0    Food/medication interactions noted: avoid grapefruit, avoid vitamin/mineral supplements with synthroid     Vitamins/Supplements: Boost Plus via PEG    Labs: Reviewed -Albumin 2.9, bilirubin 1.1, Potassium 2.7    Nutrition Diagnosis    Problem: inadequate protein/energy intake  Etiology (related to): appetite loss  and dysphagia   Signs/Symptoms (as evidenced by): weight loss    Nutrition Intervention    Nutrition Prescription   8166-4632 Kcals (30-35kcal/kg)  78 g protein (1.5g/kg)   9243-2493 mL fluid (30-35mL/kg)    Recommendations:  Continue diluted tube feeds until alternative formula can be approved  Recommended trying a 60/40 or 70/30 mix of Boost Plus to water to increase the amount of Boost she is receiving per day  Consider steroids for nausea/gagging if no improvement over the next week    Nutrition Monitoring and Evaluation    Monitor: energy intake, rate/formulation of tube feeding, diet tolerance  and weight    Goals: weight stable or gain     Motivation to change/anticipated barriers: patient has good support, limited by her current symptoms     Follow up In 2-3 weeks     Communication to referring provider/care team: discussed with provider; note available in chart     Counseling time: 15 Minutes    Uma Kate, MPH, RD, , LDN, FAND   [phone]

## 2020-07-15 NOTE — TELEPHONE ENCOUNTER
Dry heaving, no vomit. Started to try tube feed to see if that would help with nausea. 2oz of water started given, noticed leaking around the bandage, bottom of shirt was wet. Discussed concerns about tube being not in the right place, he instilled tube feed while on the phone. No leaking, same amount of pressure as usual. Discussed some drainage is normal through the healing process. Possibly water from the syringe dripped on her shirt making it seem like more. If happens again and more fluid may need to go to ED. Pt currently denies pain at site, no pain during tube feed, no swelling

## 2020-07-15 NOTE — TELEPHONE ENCOUNTER
----- Message from Traci Johnson sent at 7/15/2020  2:44 PM CDT -----  Regarding: Advice  Contact: pt   Reason: Pt  says he need to speak with a nurse a,s,a,p no other info provided    Communication:[phone]

## 2020-07-16 ENCOUNTER — DOCUMENT SCAN (OUTPATIENT)
Dept: HOME HEALTH SERVICES | Facility: HOSPITAL | Age: 70
End: 2020-07-16
Payer: COMMERCIAL

## 2020-07-16 ENCOUNTER — EXTERNAL HOME HEALTH (OUTPATIENT)
Dept: HOME HEALTH SERVICES | Facility: HOSPITAL | Age: 70
End: 2020-07-16
Payer: COMMERCIAL

## 2020-07-17 ENCOUNTER — PATIENT MESSAGE (OUTPATIENT)
Dept: HEMATOLOGY/ONCOLOGY | Facility: CLINIC | Age: 70
End: 2020-07-17

## 2020-07-17 DIAGNOSIS — G89.3 NEOPLASM RELATED PAIN: ICD-10-CM

## 2020-07-17 RX ORDER — MORPHINE SULFATE 20 MG/ML
10 SOLUTION ORAL EVERY 4 HOURS PRN
Qty: 90 ML | Refills: 0 | Status: SHIPPED | OUTPATIENT
Start: 2020-07-17 | End: 2020-08-16

## 2020-07-17 NOTE — TELEPHONE ENCOUNTER
----- Message from Cynthia Jenkins sent at 7/17/2020  1:59 PM CDT -----  Regarding: Resubmit prescription for 30 days  Contact: Ms Teixeira /  [facility] [phone]  Type:  Resubmit prescription  Morphine for 30 days        Name of Caller:Ms Teixeira /  [facility] [phone] ext [phone].   Ms Teixeira states received prescription for Morphine for 39 days it was decline need prescription resubmitted for 30 days  When is the first available appointment?  Symptoms:  Best Call Back Number:  Additional Information:

## 2020-07-20 ENCOUNTER — PATIENT MESSAGE (OUTPATIENT)
Dept: HEMATOLOGY/ONCOLOGY | Facility: CLINIC | Age: 70
End: 2020-07-20

## 2020-07-20 ENCOUNTER — LAB VISIT (OUTPATIENT)
Dept: LAB | Facility: HOSPITAL | Age: 70
End: 2020-07-20
Attending: INTERNAL MEDICINE
Payer: COMMERCIAL

## 2020-07-20 DIAGNOSIS — B37.0 THRUSH, ORAL: Primary | ICD-10-CM

## 2020-07-20 DIAGNOSIS — C25.0 MALIGNANT NEOPLASM OF HEAD OF PANCREAS: ICD-10-CM

## 2020-07-20 LAB
ALBUMIN SERPL BCP-MCNC: 2.4 G/DL (ref 3.5–5.2)
ALP SERPL-CCNC: 160 U/L (ref 55–135)
ALT SERPL W/O P-5'-P-CCNC: 13 U/L (ref 10–44)
ANION GAP SERPL CALC-SCNC: 10 MMOL/L (ref 8–16)
AST SERPL-CCNC: 21 U/L (ref 10–40)
BILIRUB SERPL-MCNC: 0.6 MG/DL (ref 0.1–1)
BUN SERPL-MCNC: 26 MG/DL (ref 8–23)
CALCIUM SERPL-MCNC: 10.2 MG/DL (ref 8.7–10.5)
CHLORIDE SERPL-SCNC: 112 MMOL/L (ref 95–110)
CO2 SERPL-SCNC: 18 MMOL/L (ref 23–29)
CREAT SERPL-MCNC: 1 MG/DL (ref 0.5–1.4)
ERYTHROCYTE [DISTWIDTH] IN BLOOD BY AUTOMATED COUNT: 16.7 % (ref 11.5–14.5)
EST. GFR  (AFRICAN AMERICAN): >60 ML/MIN/1.73 M^2
EST. GFR  (NON AFRICAN AMERICAN): 58 ML/MIN/1.73 M^2
GLUCOSE SERPL-MCNC: 93 MG/DL (ref 70–110)
HCT VFR BLD AUTO: 37.2 % (ref 37–48.5)
HGB BLD-MCNC: 11.5 G/DL (ref 12–16)
IMM GRANULOCYTES # BLD AUTO: 1.14 K/UL (ref 0–0.04)
MCH RBC QN AUTO: 30.3 PG (ref 27–31)
MCHC RBC AUTO-ENTMCNC: 30.9 G/DL (ref 32–36)
MCV RBC AUTO: 98 FL (ref 82–98)
NEUTROPHILS # BLD AUTO: 8.5 K/UL (ref 1.8–7.7)
PLATELET # BLD AUTO: 360 K/UL (ref 150–350)
PMV BLD AUTO: 9.5 FL (ref 9.2–12.9)
POTASSIUM SERPL-SCNC: 5 MMOL/L (ref 3.5–5.1)
PROT SERPL-MCNC: 6.5 G/DL (ref 6–8.4)
RBC # BLD AUTO: 3.79 M/UL (ref 4–5.4)
SODIUM SERPL-SCNC: 140 MMOL/L (ref 136–145)
WBC # BLD AUTO: 13.1 K/UL (ref 3.9–12.7)

## 2020-07-20 PROCEDURE — 36415 COLL VENOUS BLD VENIPUNCTURE: CPT | Mod: PO

## 2020-07-20 PROCEDURE — 85027 COMPLETE CBC AUTOMATED: CPT

## 2020-07-20 PROCEDURE — 80053 COMPREHEN METABOLIC PANEL: CPT

## 2020-07-20 RX ORDER — NYSTATIN 100000 [USP'U]/ML
6 SUSPENSION ORAL 4 TIMES DAILY
Qty: 240 ML | Refills: 0 | Status: SHIPPED | OUTPATIENT
Start: 2020-07-20 | End: 2020-07-30

## 2020-07-21 ENCOUNTER — TELEPHONE (OUTPATIENT)
Dept: HEMATOLOGY/ONCOLOGY | Facility: CLINIC | Age: 70
End: 2020-07-21

## 2020-07-21 ENCOUNTER — OFFICE VISIT (OUTPATIENT)
Dept: HEMATOLOGY/ONCOLOGY | Facility: CLINIC | Age: 70
End: 2020-07-21
Payer: COMMERCIAL

## 2020-07-21 ENCOUNTER — INFUSION (OUTPATIENT)
Dept: INFUSION THERAPY | Facility: HOSPITAL | Age: 70
End: 2020-07-21
Attending: INTERNAL MEDICINE
Payer: COMMERCIAL

## 2020-07-21 VITALS
TEMPERATURE: 97 F | RESPIRATION RATE: 17 BRPM | OXYGEN SATURATION: 97 % | SYSTOLIC BLOOD PRESSURE: 107 MMHG | HEART RATE: 92 BPM | DIASTOLIC BLOOD PRESSURE: 66 MMHG

## 2020-07-21 VITALS
DIASTOLIC BLOOD PRESSURE: 70 MMHG | HEART RATE: 94 BPM | TEMPERATURE: 97 F | SYSTOLIC BLOOD PRESSURE: 104 MMHG | BODY MASS INDEX: 19.68 KG/M2 | WEIGHT: 107.56 LBS

## 2020-07-21 DIAGNOSIS — C25.0 MALIGNANT NEOPLASM OF HEAD OF PANCREAS: Primary | ICD-10-CM

## 2020-07-21 DIAGNOSIS — F41.9 ANXIETY: ICD-10-CM

## 2020-07-21 DIAGNOSIS — Z93.1 GASTROSTOMY IN PLACE: ICD-10-CM

## 2020-07-21 DIAGNOSIS — H81.09 MENIERE'S DISEASE, UNSPECIFIED LATERALITY: ICD-10-CM

## 2020-07-21 PROCEDURE — 1159F PR MEDICATION LIST DOCUMENTED IN MEDICAL RECORD: ICD-10-PCS | Mod: S$GLB,,, | Performed by: INTERNAL MEDICINE

## 2020-07-21 PROCEDURE — 3008F PR BODY MASS INDEX (BMI) DOCUMENTED: ICD-10-PCS | Mod: CPTII,S$GLB,, | Performed by: INTERNAL MEDICINE

## 2020-07-21 PROCEDURE — 99215 OFFICE O/P EST HI 40 MIN: CPT | Mod: S$GLB,,, | Performed by: INTERNAL MEDICINE

## 2020-07-21 PROCEDURE — 1126F AMNT PAIN NOTED NONE PRSNT: CPT | Mod: S$GLB,,, | Performed by: INTERNAL MEDICINE

## 2020-07-21 PROCEDURE — 3008F BODY MASS INDEX DOCD: CPT | Mod: CPTII,S$GLB,, | Performed by: INTERNAL MEDICINE

## 2020-07-21 PROCEDURE — 96416 CHEMO PROLONG INFUSE W/PUMP: CPT

## 2020-07-21 PROCEDURE — 96368 THER/DIAG CONCURRENT INF: CPT

## 2020-07-21 PROCEDURE — 3078F PR MOST RECENT DIASTOLIC BLOOD PRESSURE < 80 MM HG: ICD-10-PCS | Mod: CPTII,S$GLB,, | Performed by: INTERNAL MEDICINE

## 2020-07-21 PROCEDURE — 99215 PR OFFICE/OUTPT VISIT, EST, LEVL V, 40-54 MIN: ICD-10-PCS | Mod: S$GLB,,, | Performed by: INTERNAL MEDICINE

## 2020-07-21 PROCEDURE — 96413 CHEMO IV INFUSION 1 HR: CPT

## 2020-07-21 PROCEDURE — 1101F PT FALLS ASSESS-DOCD LE1/YR: CPT | Mod: CPTII,S$GLB,, | Performed by: INTERNAL MEDICINE

## 2020-07-21 PROCEDURE — 63600175 PHARM REV CODE 636 W HCPCS: Performed by: INTERNAL MEDICINE

## 2020-07-21 PROCEDURE — 99999 PR PBB SHADOW E&M-EST. PATIENT-LVL IV: CPT | Mod: PBBFAC,,, | Performed by: INTERNAL MEDICINE

## 2020-07-21 PROCEDURE — 3074F SYST BP LT 130 MM HG: CPT | Mod: CPTII,S$GLB,, | Performed by: INTERNAL MEDICINE

## 2020-07-21 PROCEDURE — 1159F MED LIST DOCD IN RCRD: CPT | Mod: S$GLB,,, | Performed by: INTERNAL MEDICINE

## 2020-07-21 PROCEDURE — 3074F PR MOST RECENT SYSTOLIC BLOOD PRESSURE < 130 MM HG: ICD-10-PCS | Mod: CPTII,S$GLB,, | Performed by: INTERNAL MEDICINE

## 2020-07-21 PROCEDURE — 96367 TX/PROPH/DG ADDL SEQ IV INF: CPT

## 2020-07-21 PROCEDURE — 1126F PR PAIN SEVERITY QUANTIFIED, NO PAIN PRESENT: ICD-10-PCS | Mod: S$GLB,,, | Performed by: INTERNAL MEDICINE

## 2020-07-21 PROCEDURE — 99999 PR PBB SHADOW E&M-EST. PATIENT-LVL IV: ICD-10-PCS | Mod: PBBFAC,,, | Performed by: INTERNAL MEDICINE

## 2020-07-21 PROCEDURE — 1101F PR PT FALLS ASSESS DOC 0-1 FALLS W/OUT INJ PAST YR: ICD-10-PCS | Mod: CPTII,S$GLB,, | Performed by: INTERNAL MEDICINE

## 2020-07-21 PROCEDURE — 25000003 PHARM REV CODE 250: Performed by: INTERNAL MEDICINE

## 2020-07-21 PROCEDURE — 96415 CHEMO IV INFUSION ADDL HR: CPT

## 2020-07-21 PROCEDURE — 3078F DIAST BP <80 MM HG: CPT | Mod: CPTII,S$GLB,, | Performed by: INTERNAL MEDICINE

## 2020-07-21 RX ORDER — EPINEPHRINE 0.3 MG/.3ML
0.3 INJECTION SUBCUTANEOUS ONCE AS NEEDED
Status: CANCELLED | OUTPATIENT
Start: 2020-07-21

## 2020-07-21 RX ORDER — SODIUM CHLORIDE 0.9 % (FLUSH) 0.9 %
10 SYRINGE (ML) INJECTION
Status: CANCELLED | OUTPATIENT
Start: 2020-07-21

## 2020-07-21 RX ORDER — DIPHENHYDRAMINE HYDROCHLORIDE 50 MG/ML
50 INJECTION INTRAMUSCULAR; INTRAVENOUS ONCE AS NEEDED
Status: CANCELLED | OUTPATIENT
Start: 2020-07-21

## 2020-07-21 RX ORDER — HEPARIN 100 UNIT/ML
500 SYRINGE INTRAVENOUS
Status: CANCELLED | OUTPATIENT
Start: 2020-07-23

## 2020-07-21 RX ORDER — HEPARIN 100 UNIT/ML
500 SYRINGE INTRAVENOUS
Status: CANCELLED | OUTPATIENT
Start: 2020-07-21

## 2020-07-21 RX ORDER — SODIUM CHLORIDE 0.9 % (FLUSH) 0.9 %
10 SYRINGE (ML) INJECTION
Status: CANCELLED | OUTPATIENT
Start: 2020-07-23

## 2020-07-21 RX ADMIN — DEXAMETHASONE SODIUM PHOSPHATE: 10 INJECTION, SOLUTION INTRAMUSCULAR; INTRAVENOUS at 10:07

## 2020-07-21 RX ADMIN — SODIUM CHLORIDE 1000 ML: 0.9 INJECTION, SOLUTION INTRAVENOUS at 10:07

## 2020-07-21 RX ADMIN — OXALIPLATIN 127 MG: 5 INJECTION, SOLUTION, CONCENTRATE INTRAVENOUS at 11:07

## 2020-07-21 RX ADMIN — LEUCOVORIN CALCIUM 595 MG: 350 INJECTION, POWDER, LYOPHILIZED, FOR SOLUTION INTRAMUSCULAR; INTRAVENOUS at 11:07

## 2020-07-21 RX ADMIN — FLUOROURACIL 3575 MG: 50 INJECTION, SOLUTION INTRAVENOUS at 01:07

## 2020-07-21 NOTE — Clinical Note
Unable to tell who is seeing her in palliative care in early August, can you send them a message that I feel Mrs. Aviles and her  are not understanding the depth of her illness and not coping well. We would like them to address that if they can

## 2020-07-21 NOTE — TELEPHONE ENCOUNTER
----- Message from Amanda Singh MD sent at 7/21/2020  9:35 AM CDT -----  Also needs to see psychology on main campus need virtual only

## 2020-07-21 NOTE — Clinical Note
Schedule CBC,CMP and see me in 2 weeks on [facility] (live visit) and for FOLFOX. DC pump on day 3. Neulasta on day 3

## 2020-07-21 NOTE — PLAN OF CARE
Pt arrived to unit. Pt cleared for chemo per Dr. Singh. VSS. Pt afebrile. Pt states she has extreme fatigue. Also states 2 days ago she started with numbness in her lower legs and feet. RN informed Dr. Singh as this is a new finding. Tolerated IVF and premeds. Tolerated Oxaliplatin and Leucovorin. No reactions noted. 5Fu per pump infusing. Pt has next appt. In wheelchair, discharged from unit with . No distress noted.

## 2020-07-21 NOTE — PROGRESS NOTES
Subjective:       Patient ID: Mercedes Aviles is a 70 y.o. female.    Chief Complaint: Pancreatic Cancer  Mrs. Aviles is a 70 year old female with HTN, HPL,. Menier's disease who underwent a Nissen Fundoplication and laparoscopic cholecystectomy on 4/21/2020. She presented to ED on 4/25/2020 with nausea and and temp of 99.4. Right upper quadrant ultrasound did not reveal ductal dilation or stones.  She then returned back to ED on 5/8/2020 with jaundice and was noted to have lab with tbili 12, ,  Alkphos 1641.  She underwent Ct scans on 5/8/2020 which revealed soft tissue infiltrative mass, measuring 5.3 cm X 2.8 X 4.3 cm, centered at the ute hepatis that appears to surround the CBD producing CBD as well as intrahepatic dilation. There is encasement of the SMA and celiac branches. It does not appear to arise from the pancreas but infiltrative pancreas neoplasm cannot be excluded. Lymph nodes noted in rigt upper quadrant/ute hepatis. Also stomach wall thickening, venous structures within left hepatic lobe are diminutive raising the possibility of stricture due to infiltrative spread of neoplasm or possible arterial thrombus. Focus noted in right ovary measuring 1.5 cm, appears non specific.  She underwent EUS on 5/11/2020 revealed pancreatic parenchymal abnormalities in the genu of the pancreas, these include 2X 3 cm area / mass and diffuse dilatation of intra hepatic bile ducts. PAth from this procedure revealed atypical groups of ductal cells in a background of chronic pancreatitis.     She then underwent MRI MRCP on 5/12/2020, which revealed biliary duct dilatation with abrupt severe narrowing or occlusion of CBC near the ampulla of vater at the level of pancreatic head. Also infiltrative soft tissue within ute hepatis and retroperitoneum surrounding the celiac and SMA remains nonspecific, differential includes pancreas versus ampullary neoplasm, significant narrowing of portal vein within the  infiltrative mass in ute hepatis, small amount of ascitis.  ERCP on 5/13/2020 and CHD stricture was noted which was brushed and stent placed, also stent placed in PD. Path was negative for malignancy     She then underwent a repeat EUS on 5/20/2020 and was noted to have an abnormal area of poorly defined, hypoechoic, soft tissue density in the area of celiac trunk (actually encasing the vessel) measuring about 2.5 cm.   She also underwent ERCP on same day and was noted to have a single stricture in the CHD which was brushed X 3 and stent was placed.   This final FNA from above reveals pancreatic ductal adenocarcinoma with perineural invasion.     Reviewed case in Upper GI conference and consensus is that her tumor is unresectable and plan was to proceed with palliative chemo     She is now on FOLFOX which started on 6/25/2020    She also had a PEG tube placed on 6/25/2020    HPIShe comes in today for cycle 3 of FOLFOX. She had trouble with boost and the reached out to nutrition with [facility] and was recommended Peptide 1.5, Once he crosses 4 ounces via PEG tube  notes resistance in the PEG tube, she is currently on 12 ounces/feed per day. She drinks water by mouth. She has tried eating mashed potatoes (couple of spoons), soup.  She is currently not taking any pain meds at home.  notes that when she sits for a long time she notes pain in her back and then he lays her down and massages her back, she feels better.  also notes that she gags when she tries to eat,. She is not taking any meds for nausea or anxiety. She saw palliative care and they refilled her lexapro which she started 3 days ago,  In chemo unit, patient noted that she has pain/tingling in her feet X 3 days           Review of Systems   Constitutional: Positive for fatigue and unexpected weight change. Negative for appetite change.   HENT: Negative for mouth sores.    Eyes: Negative for visual disturbance.   Respiratory: Negative  for cough and shortness of breath.    Cardiovascular: Negative for chest pain.   Gastrointestinal: Negative for abdominal pain and diarrhea.   Genitourinary: Negative for frequency.   Musculoskeletal: Negative for back pain.   Integumentary:  Negative for rash.   Neurological: Negative for headaches.   Hematological: Negative for adenopathy.   Psychiatric/Behavioral: The patient is not nervous/anxious.    All other systems reviewed and are negative.        Objective:      Physical Exam  Vitals signs reviewed.   Constitutional:       Appearance: She is well-developed. She is ill-appearing and toxic-appearing.   HENT:      Mouth/Throat:      Pharynx: No oropharyngeal exudate.   Cardiovascular:      Rate and Rhythm: Normal rate.      Heart sounds: Normal heart sounds.   Pulmonary:      Effort: Pulmonary effort is normal.      Breath sounds: Normal breath sounds. No wheezing.   Abdominal:      General: Bowel sounds are normal.      Palpations: Abdomen is soft.      Tenderness: There is no abdominal tenderness.      Comments: PEG tube site appears clean, no redness+   Musculoskeletal:         General: No tenderness.   Lymphadenopathy:      Cervical: No cervical adenopathy.   Skin:     General: Skin is warm and dry.      Findings: No rash.   Neurological:      Mental Status: She is alert and oriented to person, place, and time.      Coordination: Coordination normal.   Psychiatric:         Thought Content: Thought content normal.         Judgment: Judgment normal.           LABS:  WBC   Date Value Ref Range Status   07/20/2020 13.10 (H) 3.90 - 12.70 K/uL Final     Hemoglobin   Date Value Ref Range Status   07/20/2020 11.5 (L) 12.0 - 16.0 g/dL Final     Hematocrit   Date Value Ref Range Status   07/20/2020 37.2 37.0 - 48.5 % Final     Platelets   Date Value Ref Range Status   07/20/2020 360 (H) 150 - 350 K/uL Final     Gran # (ANC)   Date Value Ref Range Status   07/20/2020 8.5 (H) 1.8 - 7.7 K/uL Final     Comment:     The  ANC is based on a white cell differential from an   automated cell counter. It has not been microscopically   reviewed for the presence of abnormal cells. Clinical   correlation is required.         Chemistry        Component Value Date/Time     07/20/2020 1320    K 5.0 07/20/2020 1320     (H) 07/20/2020 1320    CO2 18 (L) 07/20/2020 1320    BUN 26 (H) 07/20/2020 1320    CREATININE 1.0 07/20/2020 1320    GLU 93 07/20/2020 1320        Component Value Date/Time    CALCIUM 10.2 07/20/2020 1320    ALKPHOS 160 (H) 07/20/2020 1320    AST 21 07/20/2020 1320    ALT 13 07/20/2020 1320    BILITOT 0.6 07/20/2020 1320    ESTGFRAFRICA >60.0 07/20/2020 1320    EGFRNONAA 58.0 (A) 07/20/2020 1320          Assessment:       1. Malignant neoplasm of head of pancreas    2. Anxiety    3. Meniere's disease, unspecified laterality    4. Gastrostomy in place        Plan:        1,2.3. She continues to do poorly, ECOG PS is 2 at best, She is very depressed and  is angry at her condition which is understandable, they have finally agreed to see Psychology, so we will schedule that.  They do not seem to understand the depth of her illness, will have palliative care address this issue with them  For now will continue with dose reduced FOLFOX, she is not strong for chemo than that.  She is also continuing with tube feeds and is regular communication with nutrition. Also discussed using antiemetics for nausea and Klonopin for anxiety    Above care plan was discussed with patient and accompanying  and all questions were addressed to their satisfaction   Prolonged visit over 40 minutes face to face with more than 50% spent on counseling

## 2020-07-22 ENCOUNTER — PATIENT MESSAGE (OUTPATIENT)
Dept: HEMATOLOGY/ONCOLOGY | Facility: CLINIC | Age: 70
End: 2020-07-22

## 2020-07-23 ENCOUNTER — INFUSION (OUTPATIENT)
Dept: INFUSION THERAPY | Facility: HOSPITAL | Age: 70
End: 2020-07-23
Attending: INTERNAL MEDICINE
Payer: COMMERCIAL

## 2020-07-23 VITALS
HEART RATE: 97 BPM | OXYGEN SATURATION: 96 % | DIASTOLIC BLOOD PRESSURE: 61 MMHG | TEMPERATURE: 98 F | SYSTOLIC BLOOD PRESSURE: 98 MMHG | RESPIRATION RATE: 17 BRPM

## 2020-07-23 DIAGNOSIS — C25.0 MALIGNANT NEOPLASM OF HEAD OF PANCREAS: Primary | ICD-10-CM

## 2020-07-23 PROCEDURE — A4216 STERILE WATER/SALINE, 10 ML: HCPCS | Performed by: INTERNAL MEDICINE

## 2020-07-23 PROCEDURE — 63600175 PHARM REV CODE 636 W HCPCS: Mod: JG | Performed by: INTERNAL MEDICINE

## 2020-07-23 PROCEDURE — 96377 APPLICATON ON-BODY INJECTOR: CPT

## 2020-07-23 PROCEDURE — 25000003 PHARM REV CODE 250: Performed by: INTERNAL MEDICINE

## 2020-07-23 RX ORDER — HEPARIN 100 UNIT/ML
500 SYRINGE INTRAVENOUS
Status: DISCONTINUED | OUTPATIENT
Start: 2020-07-23 | End: 2020-07-23 | Stop reason: HOSPADM

## 2020-07-23 RX ORDER — SODIUM CHLORIDE 0.9 % (FLUSH) 0.9 %
10 SYRINGE (ML) INJECTION
Status: DISCONTINUED | OUTPATIENT
Start: 2020-07-23 | End: 2020-07-23 | Stop reason: HOSPADM

## 2020-07-23 RX ADMIN — Medication 10 ML: at 11:07

## 2020-07-23 RX ADMIN — HEPARIN 500 UNITS: 100 SYRINGE at 11:07

## 2020-07-23 RX ADMIN — PEGFILGRASTIM 6 MG: KIT SUBCUTANEOUS at 11:07

## 2020-07-23 NOTE — PLAN OF CARE
Pt arrived to unit. VSS. Pt afebrile. Pt reports a lot of nausea and some vomiting this cycle. Pt not taking the full dose of antiemetic. Pt instructed to take full dose as directed. Also had diarrhea the last 2 days. Pt taking imodium. Pt instructed to call Dr. Singh if not relieved by imodium. 5FU per pump completed. Neulasta on body applied to left arm. Pt has next appts for next chemo cycle. Pt in wheelchair, discharged with . No distress noted.

## 2020-07-24 ENCOUNTER — OFFICE VISIT (OUTPATIENT)
Dept: PSYCHIATRY | Facility: CLINIC | Age: 70
End: 2020-07-24
Payer: COMMERCIAL

## 2020-07-24 DIAGNOSIS — C25.0 MALIGNANT NEOPLASM OF HEAD OF PANCREAS: ICD-10-CM

## 2020-07-24 PROCEDURE — 90791 PSYCH DIAGNOSTIC EVALUATION: CPT | Mod: 95,,, | Performed by: PSYCHOLOGIST

## 2020-07-24 PROCEDURE — 90791 PR PSYCHIATRIC DIAGNOSTIC EVALUATION: ICD-10-PCS | Mod: 95,,, | Performed by: PSYCHOLOGIST

## 2020-07-24 NOTE — Clinical Note
Met with this family.  is....interesting. Reviewed with them breathing exercises to help with swallowing

## 2020-07-24 NOTE — PROGRESS NOTES
INFORMED CONSENT/ LIMITS of CONFIDENTIALITY: Prior to beginning the interview, the patient's identification was confirmed via name and date of birth. Mercedes Aviles  was informed of the possible risks and benefits of psychological interventions (e.g., counseling, psychotherapy, testing) and provided information regarding the handling of protected health records and   the limits of confidentiality, including the importance of reporting any suicidal or homicidal ideation to ensure safety of all parties. This provider explained the purpose of today's appointment and the patient was provided with time to ask questions regarding this information.  Acceptance and understanding of these conditions was expressed, and Mercedes Aviles freely consented to this evaluation.     TELEMEDICINE PSYCHO-ONCOLOGY INTAKE    Consultation started: 7/24/2020 at 8:07 AM   The chief complaint leading to consultation is: psych eval  Virtual visit with synchronous audio and video   The patient location is:  Patient home     Also present with the patient at the time of the consultation: spouse and son    Each patient provided medical services by telemedicine is:  (1) informed of the relationship between the physician and patient and the respective role of any other health care provider with respect to management of the patient; and (2) notified that he or she may decline to receive medical services by telemedicine and may withdraw from such care at any time    Crisis Disclaimer: Patient was informed that due to the virtual nature of the visit, that if a crisis develops, protocols will be implemented to ensure patient safety, including but not limited to: 1) Initiating a welfare check with local Law Enforcement, 2) Calling 1/National Crisis Hotline, and/or 3) Initiating PEC/CEC procedures.     Diagnostic Interview - CPT 01319    Date: 7/24/2020  [facility]     Evaluation Length (direct face-to-face time):  1  hour     Referral Source: Amanda Singh MD   Oncologist:   PCP: Lashawn Sims MD    Clinical status of patient: Outpatient    Mercedes Aviles, a 70 y.o. female, seen for initial evaluation visit.  Met with patient, son and spouse.    Chief complaint/reason for encounter: adjustment to illness, Psychological Evaluation and treatment recommendations    Medical/Surgical History:    Patient Active Problem List   Diagnosis    Anxiety    Hypertension    Menopause syndrome    Allergic contact dermatitis    Hypothyroidism    Meniere disease    Cancer of pancreas       Health Behaviors:       ETOH Use: No (rare)       Tobacco Use: No   Illicit Drug Use:  No     Prescription Misuse:No   Caffeine: minimal   Exercise:Some walking during the day, mostly bedbound.   Firearms:  No   Advanced directives:No     Family History:   Psychiatric illness: Yes  Sister- Depressed   Alcohol/Drug Abuse: No     Suicide: No      Past Psychiatric History:   Inpatient treatment: No     Outpatient treatment: No     Prior substance abuse treatment: No     Suicide Attempts: No     Psychotropic Medications:  Current: Klonopin and Lexapro        Past: none    Current medications as per below, allergies reviewed in chart.    Current Outpatient Medications   Medication    amLODIPine (NORVASC) 5 MG tablet    atorvastatin (LIPITOR) 40 MG tablet    betahistine HCl (BETAHISTINE, BULK, MISC)    clonazePAM (KLONOPIN) 0.5 MG tablet    cyproheptadine (PERIACTIN) 4 mg tablet    dicyclomine (BENTYL) 10 mg/5 mL syrup    doxycycline (VIBRA-TABS) 100 MG tablet    duke's soln (benadryl 30 mL, mylanta 30 mL, lidocaine 30 mL, nystatin 30 mL) 120mL    escitalopram oxalate (LEXAPRO) 5 MG Tab    HYDROmorphone (DILAUDID) 2 MG tablet    levothyroxine (SYNTHROID) 50 MCG tablet    lidocaine (LIDODERM) 5 %    lidocaine-prilocaine (EMLA) cream    lipase-protease-amylase 24,000-76,000-120,000 units (CREON) 24,000-76,000 -120,000 unit capsule     metoclopramide HCl (REGLAN) 5 MG tablet    morphine 100 mg/5 mL (20 mg/mL) concentrated solution    nystatin (MYCOSTATIN) 100,000 unit/mL suspension    ondansetron (ZOFRAN-ODT) 8 MG TbDL    pantoprazole (PROTONIX) 40 MG tablet    potassium chloride 10% (KAYCIEL) 20 mEq/15 mL oral solution    promethazine (PHENERGAN) 25 MG tablet    spironolactone (ALDACTONE) 25 MG tablet    traMADoL (ULTRAM) 50 mg tablet     No current facility-administered medications for this visit.         CAM Therapies: None     Screening:  Depression: Positive  Luz: Denies Psychosis: Denies    Generalized anxiety: Positive Panic Disorder: Denies    Social/specific phobia: Denies OCD: Denies   Sexual Dysfunction:  Denies Trauma: Mother's death       Social situation/Stressors: Mercedes Aviles lives with  in [facility].  She owned a small lawn business.      Mercedes Aviles has been  22 and has two children (Aury and Krishan).  Her spouse is an  at [employer] .   The patient reports great social support.  Mercedes Aviles is an active member of the [employer].  Mercedes Aviles's hobbies include landscaping, going out to dinner, casinos.    Additional stressors:  Grief- Death of Mother 1 year ago following being a long-term caregiver for her mother with Dementia    Strengths:Steady employment, financial stability, Housing stability, Able to vocalize needs, Resources - social, interpersonal, monetary, Interpersonal relationships and supports available - family, relatives, friends and Cultural/spiritual/Cheondoism and community involvement  Liabilities: Complicated medical illness    Current Evaluation:     Mental Status Exam: Mercedes Aviles arrived promptly for the assessment session. Her  and son was also present during the interview, with the consent of Mercedes Aviles.  The patient was fully cooperative throughout the interview and was an adequate historian   Appearance: age appropriate,  appropriately  dressed, adequately  groomed  Behavior/Cooperation: friendly and cooperative  Speech: quiet  Mood: depressed  Affect: blunted  Thought Process: goal-directed, logical  Thought Content: normal, no suicidality, no homicidality, delusions, or paranoia;did not appear to be responding to internal stimuli during the interview.   Orientation: grossly intact  Memory: Grossly intact  Attention Span/Concentration: Lethargic, falling sleep  Fund of Knowledge: average  Estimate of Intelligence: average from verbal skills and history  Cognition: grossly intact  Insight: patient has awareness of illness; good insight into own behavior and behavior of others  Judgment: the patient's behavior is adequate to circumstances    Distress Score    Distress Score: (P) 7        Practical Problems Physical Problems   : (P) No Appearance: (P) Yes   Housing: (P) No Bathing / Dressing: (P) Yes   Insurance / Financial: (P) No Breathing: (P) No    Transportation: (P) No  Changes in Urination: (P) No    Work / School: (P) No  Constipation: (P) No   Treatment Decisions: (P) No  Diarrhea: (P) Yes     Eating: (P) Yes    Family Problems Fatigue: (P) Yes    Dealing with Children: (P) No Feeling Swollen: (P) Yes    Dealing with Partner: (P) No Fevers: (P) No    Ability to Have Children: (P) No  Getting Around: (P) Yes       Indigestion: (P) No     Memory / Concentration: (P) No   Emotional Problems Mouth Sores: (P) Yes    Depression: (P) Yes  Nausea: (P) Yes    Fears: (P) Yes  Nose Dry / Congested: (P) No    Nervousness: (P) Yes  Pain: (P) No    Sadness: (P) Yes Sexual: (P) No    Worry: (P) Yes Skin Dry / Itchy: (P) No    Loss of Interest in Usual Activities: (P) No Sleep: (P) No     Substance Abuse: (P) No    Spiritual/Religions Concerns Tingling in Hands / Feet: (P) Yes   Spritual / Mormon Concerns: (P) No         Other Problems            PHQ ANSWERS    Q1. Little interest or pleasure in doing things: (P) More than half  the days (07/23/20 0917)  Q2. Feeling down, depressed, or hopeless: (P) Nearly every day (07/23/20 0917)  Q3. Trouble falling or staying asleep, or sleeping too much: (P) Not at all (07/23/20 0917)  Q4. Feeling tired or having little energy: (P) Nearly every day (07/23/20 0917)  Q5. Poor appetite or overeating: (P) Nearly every day (07/23/20 0917)  Q6. Feeling bad about yourself - or that you are a failure or have let yourself or your family down: (P) Several days (07/23/20 0917)  Q7. Trouble concentrating on things, such as reading the newspaper or watching television: (P) Not at all (07/23/20 0917)  Q8. Moving or speaking so slowly that other people could have noticed. Or the opposite - being so fidgety or restless that you have been moving around a lot more than usual: (P) Nearly every day (07/23/20 0917)  Q9.      PHQ8 Score : (P) 15 (07/23/20 0917)  PHQ-9 Total Score: (P) 15 (07/23/20 0917)     MARS-7 Answers    GAD7 7/23/2020   1. Feeling nervous, anxious, or on edge? 3   2. Not being able to stop or control worrying? 2   3. Worrying too much about different things? 2   4. Trouble relaxing? 1   5. Being so restless that it is hard to sit still? 0   6. Becoming easily annoyed or irritable? 0   7. Feeling afraid as if something awful might happen? 1   MARS-7 Score 9     MARS-7 Score: (P) 9  Interpretation: (P) Mild Anxiety       History of present illness:    Oncology History   Cancer of pancreas   5/29/2020 Initial Diagnosis    Cancer of pancreas     5/29/2020 Tumor Markers    Patient's tumor was tested for the following markers: CA 19-9.                                             Results of the tumor marker test revealed 76.     6/1/2020 Tumor Conference       [facility] UGI MULTIDISCIPLINARY TUMOR BOARD  PATIENT REVIEW FORM   ____________________________________________________________    CLINIC #: [id number]  DATE: 6/1/2020    DIAGNOSIS: pancreas cancer     PRESENTER: Samantha    PATIENT SUMMARY:   This  69 y/o female underwent nissen, cholecystectomy in 4/2020. She felt worse after surgery so she presented to ED.   In May she developed jaundice. She underwent EUS which identified soft tissue hypodensity. Then repeat EGD with bx and finally pathology confirmed pancreatic ductal adenocarcinoma. She had ERCP with stent placement.  Non contrast CT imaging from [facility] was reviewed. There is a mass arising from body of pancreas, SMA completely encased with tumor, and involves celiac axis.    CA 19-9 = 79    BOARD RECOMMENDATIONS:   Unresectable pancreas cancer  Recommend palliative chemotherapy.     CONSULT NEEDED:     [] Surgery    [x] Hem/Onc    [] Rad/Onc    [] Dietary                 [] Social Service    [] Psychology       [] AES  [] Radiology     Clinical Stage: Tumor ** Node(s) ** Metastasis **     GROUP STAGE:  [] O    [] 1A    [] IB    [] IIA    [] IIB     [] IIIA     [] IIIB     [] IIIC    []IV                               [] Local recurrence     [] Regional recurrence     [] Distant recurrence Metastatic site(s): none         [x] Isis'l Treatment Guidelines reviewed and care planned is consistent with guidelines.         (i.e., NCCN, NCI, PD, ACO, AUA, etc.)    PRESENTATION AT CANCER CONFERENCE:         [x] Prospective    [] Retrospective     [] Follow-Up           6/17/2020 -  Chemotherapy    Treatment Summary   Plan Name: OP PANC mFOLFIRINOX Q2W  Treatment Goal: Palliative  Status: Active  Start Date: 6/17/2020  End Date: 11/26/2020 (Planned)  Provider: Amanda Singh MD  Chemotherapy: fluorouracil (ADRUCIL) 1,600 mg/m2 = 2,415 mg in sodium chloride 0.9% 240 mL chemo infusion, 1,600 mg/m2 = 2,415 mg (100 % of original dose 1,600 mg/m2), Intravenous, Over 46 hours, 3 of 12 cycles  Dose modification: 1,600 mg/m2 (original dose 1,600 mg/m2, Cycle 1)  Administration: 2,415 mg (6/17/2020), 3,575 mg (7/6/2020), 3,575 mg (7/21/2020)  irinotecan (CAMPTOSAR) 150 mg/m2 = 224 mg in sodium chloride 0.9% 500 mL chemo  infusion, 150 mg/m2 = 224 mg, Intravenous, Clinic/HOD 1 time, 0 of 9 cycles  leucovorin calcium 400 mg/m2 = 605 mg in dextrose 5 % 250 mL infusion, 400 mg/m2 = 605 mg, Intravenous, Clinic/HOD 1 time, 3 of 12 cycles  Administration: 605 mg (6/17/2020), 595 mg (7/6/2020), 595 mg (7/21/2020)  oxaliplatin (ELOXATIN) 85 mg/m2 = 128 mg in dextrose 5 % 500 mL chemo infusion, 85 mg/m2 = 128 mg, Intravenous, Clinic/HOD 1 time, 3 of 12 cycles  Administration: 128 mg (6/17/2020), 127 mg (7/6/2020), 127 mg (7/21/2020)       She currently has a feeding tube and get 3 feedings per day. Been on tube since mid-June. Patient laying in bed. Recent diagnoses on May 2020. Family's understanding that tumor in inoperable and currently in chemotherapy.  Managing nausea, vomiting, and diarrhea. Taking Klonopin for seizure activity. Patient's  particularly frustrated and angry with the process of treatment.     Patient stated significant fears related to cancer- and treatment side effects. Particularly fearful of not being well enough or strong enough to receive treatment since she struggles to eat.   Patient has significant anxiety related to eating by mouth and will often gag. Discussed deep breathing prior to talking food and medications by mouth. Discussed importance of getting up out of bed to visit with plants, briefly, for several times per day.     Mercedes Aviles has adjusted to illness with difficulty primarily through passive coping strategies. She has engaged in appropriate information gathering.  The patient has good family/friend support.  Her support system is coping poorly with the diagnosis/treatment/prognosis. Illness-related psychosocial stressors include changes in ability to engage in leisure activities.  The patient has a fair partnership with [place] [facility] [place]. The patient reports the following barriers to cancer care:none.     Symptoms:   · Mood: depressed mood, diminished interest, fatigue,  worthlessness/guilt and tearfulness;  prior depression:Following death of mother   · Anxiety: Feeling nervous, anxious, or on edge, Uncontrollable worry (about health) and Excessive worry (interfering with mood); anxiety throughout adulthood  · Substance abuse: denied  · Cognitive functioning: none  · Health behaviors: noncontributory   Sleep: Sleeps on and off throughout the day after chemo. Nighttime sleep is restless. Frequent restroom visits for BMs and Urination.   · Pain: Ms. Aviles reports cancer-related and BM  pain. Symptoms interfere with daily activity, sleeping and work.       Assessment - Diagnosis - Goals:       ICD-10-CM ICD-9-CM   1. Malignant neoplasm of head of pancreas  C25.0 157.0       Plan:individual psychotherapy and consult psychiatrist for medication evaluation; consult with RD related to medication for gagging/interaction with Klonopin; Consult with Dr. Malone    Summary and Recommendations  Mercedes Aviles is a 70 y.o. female referred by Dr. Singh for psychological evaluation and treatment.  Ms. Aviles appears to be coping poorly with her diagnosis and proposed treatment course.  Patient was encouraged to speak to her primary care physician or oncologist regarding psychotropic medication options. She is interested in CBT to address depression/anxiety/insomnia and will follow up with me for that purpose. Mood protective strategies during cancer treatment were discussed. .    GOALS:   Increase movement- getting out of bed beyond needing the restroom  Practice belly breathing 5 mins before eating/taking medication        Tiarra Anna, PhD  Clinical Psychologist  [place] License #[id number]

## 2020-07-24 NOTE — Clinical Note
Hey there!  mentioned that you said there was a medication helpful for gagging but it interacts with Klonopin?

## 2020-07-26 ENCOUNTER — PATIENT MESSAGE (OUTPATIENT)
Dept: HEMATOLOGY/ONCOLOGY | Facility: CLINIC | Age: 70
End: 2020-07-26

## 2020-07-27 ENCOUNTER — TELEPHONE (OUTPATIENT)
Dept: HEMATOLOGY/ONCOLOGY | Facility: CLINIC | Age: 70
End: 2020-07-27

## 2020-07-27 ENCOUNTER — PATIENT MESSAGE (OUTPATIENT)
Dept: PALLIATIVE MEDICINE | Facility: CLINIC | Age: 70
End: 2020-07-27

## 2020-07-27 ENCOUNTER — PATIENT MESSAGE (OUTPATIENT)
Dept: HEMATOLOGY/ONCOLOGY | Facility: CLINIC | Age: 70
End: 2020-07-27

## 2020-07-27 DIAGNOSIS — B37.0 THRUSH: Primary | ICD-10-CM

## 2020-07-27 RX ORDER — FLUCONAZOLE 100 MG/1
100 TABLET ORAL DAILY
Qty: 10 TABLET | Refills: 0 | Status: SHIPPED | OUTPATIENT
Start: 2020-07-27 | End: 2020-08-18

## 2020-07-27 NOTE — TELEPHONE ENCOUNTER
"----- Message from Caroline Marrero sent at 7/27/2020  3:40 PM CDT -----  Consult/Advisory:    Name Of Caller: Buddy   Provider Name:  KATIE KELLEY  Does patient feel the need to be seen today? No   Relationship to the Pt?: spouse   Contact Preference?:  [phone]  What is the nature of the call?:    - pt sent [employer] message, hasn't heard back. Please and speak with     Additional Notes:  "Thank you for all that you do for our patients'"           "

## 2020-07-28 ENCOUNTER — TELEPHONE (OUTPATIENT)
Dept: HEMATOLOGY/ONCOLOGY | Facility: CLINIC | Age: 70
End: 2020-07-28

## 2020-07-28 ENCOUNTER — DOCUMENT SCAN (OUTPATIENT)
Dept: HOME HEALTH SERVICES | Facility: HOSPITAL | Age: 70
End: 2020-07-28
Payer: COMMERCIAL

## 2020-07-28 NOTE — TELEPHONE ENCOUNTER
"----- Message from Caroline Marrero sent at 7/28/2020 12:07 PM CDT -----  Consult/Advisory:    Name Of Caller: Marianna Richey  Provider Name: Samantha KAUR MD   Does patient feel the need to be seen today? No   Relationship to the Pt?: n/a   Contact Preference?: [phone]  What is the nature of the call?:    Faxed formula change, but there's a issue, will need a different form signed    Additional Notes:  "Thank you for all that you do for our patients'"           "

## 2020-07-28 NOTE — TELEPHONE ENCOUNTER
"Spoke with patient's  Buddy about patient's vomiting. He explains that patient vomits less than an oz of tube feeds a couple hours after feeds occasionally. She never feels nauseated. He started giving patient scheduled zofran which has helped but has not eliminated the problem. He thinks there is a psychological component as he can sometimes "talk her out of vomiting." Patient has started seeing Dr. Anna. Lexapro 5mg recently started. Uptitrate to 10mg.    "

## 2020-07-29 ENCOUNTER — HOSPITAL ENCOUNTER (EMERGENCY)
Facility: HOSPITAL | Age: 70
Discharge: HOME | End: 2020-07-29
Attending: EMERGENCY MEDICINE
Payer: COMMERCIAL

## 2020-07-29 ENCOUNTER — PATIENT MESSAGE (OUTPATIENT)
Dept: HEMATOLOGY/ONCOLOGY | Facility: CLINIC | Age: 70
End: 2020-07-29

## 2020-07-29 VITALS
HEART RATE: 90 BPM | DIASTOLIC BLOOD PRESSURE: 71 MMHG | SYSTOLIC BLOOD PRESSURE: 109 MMHG | RESPIRATION RATE: 18 BRPM | HEIGHT: 66 IN | BODY MASS INDEX: 16.07 KG/M2 | WEIGHT: 100 LBS | OXYGEN SATURATION: 100 % | TEMPERATURE: 98 F

## 2020-07-29 DIAGNOSIS — R53.83 FATIGUE, UNSPECIFIED TYPE: Primary | ICD-10-CM

## 2020-07-29 DIAGNOSIS — R53.1 WEAKNESS: ICD-10-CM

## 2020-07-29 DIAGNOSIS — N39.0 URINARY TRACT INFECTION WITHOUT HEMATURIA, SITE UNSPECIFIED: ICD-10-CM

## 2020-07-29 LAB
ALBUMIN SERPL BCP-MCNC: 2.6 G/DL (ref 3.5–5.2)
ALP SERPL-CCNC: 225 U/L (ref 55–135)
ALT SERPL W/O P-5'-P-CCNC: 23 U/L (ref 10–44)
ANION GAP SERPL CALC-SCNC: 9 MMOL/L (ref 8–16)
APTT BLDCRRT: 23.8 SEC (ref 21–32)
AST SERPL-CCNC: 27 U/L (ref 10–40)
BACTERIA #/AREA URNS HPF: ABNORMAL /HPF
BASOPHILS # BLD AUTO: 0.03 K/UL (ref 0–0.2)
BASOPHILS NFR BLD: 0.2 % (ref 0–1.9)
BILIRUB SERPL-MCNC: 0.7 MG/DL (ref 0.1–1)
BILIRUB UR QL STRIP: NEGATIVE
BUN SERPL-MCNC: 71 MG/DL (ref 8–23)
CALCIUM SERPL-MCNC: 8.6 MG/DL (ref 8.7–10.5)
CHLORIDE SERPL-SCNC: 113 MMOL/L (ref 95–110)
CLARITY UR: CLEAR
CO2 SERPL-SCNC: 16 MMOL/L (ref 23–29)
COLOR UR: YELLOW
CREAT SERPL-MCNC: 1.2 MG/DL (ref 0.5–1.4)
DIFFERENTIAL METHOD: ABNORMAL
EOSINOPHIL # BLD AUTO: 0 K/UL (ref 0–0.5)
EOSINOPHIL NFR BLD: 0.2 % (ref 0–8)
ERYTHROCYTE [DISTWIDTH] IN BLOOD BY AUTOMATED COUNT: 16.5 % (ref 11.5–14.5)
EST. GFR  (AFRICAN AMERICAN): 54 ML/MIN/1.73 M^2
EST. GFR  (NON AFRICAN AMERICAN): 47 ML/MIN/1.73 M^2
GLUCOSE SERPL-MCNC: 118 MG/DL (ref 70–110)
GLUCOSE UR QL STRIP: NEGATIVE
HCT VFR BLD AUTO: 35.4 % (ref 37–48.5)
HGB BLD-MCNC: 11.5 G/DL (ref 12–16)
HGB UR QL STRIP: NEGATIVE
IMM GRANULOCYTES # BLD AUTO: 0.1 K/UL (ref 0–0.04)
IMM GRANULOCYTES NFR BLD AUTO: 0.6 % (ref 0–0.5)
INR PPP: 1 (ref 0.8–1.2)
KETONES UR QL STRIP: NEGATIVE
LEUKOCYTE ESTERASE UR QL STRIP: ABNORMAL
LIPASE SERPL-CCNC: 3 U/L (ref 4–60)
LYMPHOCYTES # BLD AUTO: 1.5 K/UL (ref 1–4.8)
LYMPHOCYTES NFR BLD: 8.9 % (ref 18–48)
MAGNESIUM SERPL-MCNC: 2.1 MG/DL (ref 1.6–2.6)
MCH RBC QN AUTO: 30.5 PG (ref 27–31)
MCHC RBC AUTO-ENTMCNC: 32.5 G/DL (ref 32–36)
MCV RBC AUTO: 94 FL (ref 82–98)
MICROSCOPIC COMMENT: ABNORMAL
MONOCYTES # BLD AUTO: 0.9 K/UL (ref 0.3–1)
MONOCYTES NFR BLD: 5.4 % (ref 4–15)
NEUTROPHILS # BLD AUTO: 14.6 K/UL (ref 1.8–7.7)
NEUTROPHILS NFR BLD: 84.7 % (ref 38–73)
NITRITE UR QL STRIP: NEGATIVE
NON-SQ EPI CELLS #/AREA URNS HPF: 1 /HPF
NRBC BLD-RTO: 0 /100 WBC
PH UR STRIP: 5 [PH] (ref 5–8)
PLATELET # BLD AUTO: 184 K/UL (ref 150–350)
PMV BLD AUTO: 9.9 FL (ref 9.2–12.9)
POTASSIUM SERPL-SCNC: 4.8 MMOL/L (ref 3.5–5.1)
PROT SERPL-MCNC: 6.1 G/DL (ref 6–8.4)
PROT UR QL STRIP: NEGATIVE
PROTHROMBIN TIME: 11.4 SEC (ref 9–12.5)
RBC # BLD AUTO: 3.77 M/UL (ref 4–5.4)
RBC #/AREA URNS HPF: 0 /HPF (ref 0–4)
SARS-COV-2 RDRP RESP QL NAA+PROBE: NEGATIVE
SODIUM SERPL-SCNC: 138 MMOL/L (ref 136–145)
SP GR UR STRIP: 1.03 (ref 1–1.03)
SQUAMOUS #/AREA URNS HPF: 10 /HPF
TSH SERPL DL<=0.005 MIU/L-ACNC: 1.69 UIU/ML (ref 0.4–4)
URN SPEC COLLECT METH UR: ABNORMAL
UROBILINOGEN UR STRIP-ACNC: NEGATIVE EU/DL
WBC # BLD AUTO: 17.28 K/UL (ref 3.9–12.7)
WBC #/AREA URNS HPF: 10 /HPF (ref 0–5)

## 2020-07-29 PROCEDURE — 85730 THROMBOPLASTIN TIME PARTIAL: CPT

## 2020-07-29 PROCEDURE — 80053 COMPREHEN METABOLIC PANEL: CPT

## 2020-07-29 PROCEDURE — 93010 EKG 12-LEAD: ICD-10-PCS | Mod: ,,, | Performed by: INTERNAL MEDICINE

## 2020-07-29 PROCEDURE — U0002 COVID-19 LAB TEST NON-CDC: HCPCS

## 2020-07-29 PROCEDURE — 85610 PROTHROMBIN TIME: CPT

## 2020-07-29 PROCEDURE — 96365 THER/PROPH/DIAG IV INF INIT: CPT

## 2020-07-29 PROCEDURE — 63600175 PHARM REV CODE 636 W HCPCS: Performed by: EMERGENCY MEDICINE

## 2020-07-29 PROCEDURE — 93005 ELECTROCARDIOGRAM TRACING: CPT

## 2020-07-29 PROCEDURE — 81000 URINALYSIS NONAUTO W/SCOPE: CPT

## 2020-07-29 PROCEDURE — 83690 ASSAY OF LIPASE: CPT

## 2020-07-29 PROCEDURE — 93010 ELECTROCARDIOGRAM REPORT: CPT | Mod: ,,, | Performed by: INTERNAL MEDICINE

## 2020-07-29 PROCEDURE — 85025 COMPLETE CBC W/AUTO DIFF WBC: CPT

## 2020-07-29 PROCEDURE — 83735 ASSAY OF MAGNESIUM: CPT

## 2020-07-29 PROCEDURE — 99285 EMERGENCY DEPT VISIT HI MDM: CPT | Mod: 25

## 2020-07-29 PROCEDURE — 25000003 PHARM REV CODE 250: Performed by: EMERGENCY MEDICINE

## 2020-07-29 PROCEDURE — 96361 HYDRATE IV INFUSION ADD-ON: CPT

## 2020-07-29 PROCEDURE — 84443 ASSAY THYROID STIM HORMONE: CPT

## 2020-07-29 RX ORDER — CEPHALEXIN 250 MG/5ML
500 POWDER, FOR SUSPENSION ORAL 2 TIMES DAILY
Qty: 140 ML | Refills: 0 | Status: SHIPPED | OUTPATIENT
Start: 2020-07-29 | End: 2020-08-05

## 2020-07-29 RX ORDER — CEPHALEXIN 500 MG/1
500 CAPSULE ORAL 2 TIMES DAILY
Qty: 10 CAPSULE | Refills: 0 | Status: SHIPPED | OUTPATIENT
Start: 2020-07-29 | End: 2020-08-03

## 2020-07-29 RX ORDER — ESCITALOPRAM OXALATE 10 MG/1
10 TABLET ORAL DAILY
COMMUNITY

## 2020-07-29 RX ORDER — CEPHALEXIN 250 MG/5ML
500 POWDER, FOR SUSPENSION ORAL
Status: DISCONTINUED | OUTPATIENT
Start: 2020-07-29 | End: 2020-07-29

## 2020-07-29 RX ADMIN — SODIUM CHLORIDE 1000 ML: 0.9 INJECTION, SOLUTION INTRAVENOUS at 11:07

## 2020-07-29 RX ADMIN — CEFTRIAXONE 1 G: 1 INJECTION, SOLUTION INTRAVENOUS at 01:07

## 2020-07-29 NOTE — ED PROVIDER NOTES
Encounter Date: 7/29/2020    SCRIBE #1 NOTE: I, Marga Rodríguez, am scribing for, and in the presence of,  Fan Garcia MD. I have scribed the following portions of the note - Other sections scribed: HPI, ROS.       History     Chief Complaint   Patient presents with    Fatigue     hx of stage 4 cancer, weakness, and decreased mobility    Generalized Body Aches     CC: Weakness    HPI: This 70 y.o female, with a medical history of fatigue, GERD, hyperlipidemia, hypertension, jaundice, Meniere disease, pancreatic insufficiency, weakness, and unintentional weight loss, presents to the ED via EMS transportation c/o constant, severe (10/10) weakness. Per EMS, pt's family reports that pt has been more weak and lethargic than usual. EMS notes that pt is normally able to engage in daily activities with assistance. Family reported that they initially believed that pt's symptoms were due to a recent change in her anxiety medication, however, the symptoms persisted this morning, prompting them to call EMS. Of note, EMS reports that pt currently has pancreatic cancer. No other associated symptoms. No alleviating factors.    The history is provided by the patient and the EMS personnel.     Review of patient's allergies indicates:   Allergen Reactions    Celebrex [celecoxib] Anxiety     Elevated BP    Topiramate Anaphylaxis    Latex Rash    Simvastatin Rash     Past Medical History:   Diagnosis Date    Abdominal pain     Anorexia     Anxiety     Depression     Fatigue     GERD (gastroesophageal reflux disease)     Hyperlipidemia     Hypertension     Jaundice     Meniere disease     Nausea     Pancreas cancer     Pancreatic insufficiency     Psoriasis     Weakness     Weight loss, unintentional      Past Surgical History:   Procedure Laterality Date    CARPAL TUNNEL RELEASE      right     COLONOSCOPY N/A 8/10/2018    Procedure: COLONOSCOPY;  Surgeon: Stiven Stoner MD;  Location: [address]  FLR);  Service: Endoscopy;  Laterality: N/A;  generated from previous   pt requesting this date.    EAR MASTOIDECTOMY W/ COCHLEAR IMPLANT W/ LANDMARK      ESOPHAGOGASTRODUODENOSCOPY N/A 2020    Procedure: EGD (ESOPHAGOGASTRODUODENOSCOPY);  Surgeon: Elijah Mae MD;  Location: [facility] (2ND FLR);  Service: Endoscopy;  Laterality: N/A;  EGD, probably duodenal stent/maybe PEG; Prefer Thursday.  Dr Mae/Dr Wang  Rapid Covid-19 test at 1030 - pg    FOOT SURGERY      GASTROSTOMY TUBE PLACEMENT      HYSTERECTOMY      in her 40's    INSERTION OF TUNNELED CENTRAL VENOUS CATHETER (CVC) WITH SUBCUTANEOUS PORT Right 2020    Procedure: [id number], RIGHT POSS LEFT;  Surgeon: Raleigh Dye MD;  Location: [facility] OR 2ND FLR;  Service: General;  Laterality: Right;    LAPAROSCOPIC CHOLECYSTECTOMY      NISSEN FUNDOPLICATION      PEG TUBE REMOVAL  2020    tarsal tunnel      right foot     TONSILLECTOMY       Family History   Problem Relation Age of Onset    Psoriasis Maternal Grandmother     Hypertension Mother     Dementia Mother     Hypertension Sister     Hyperlipidemia Sister     Cancer Sister         uterus/cx    No Known Problems Son     Hyperlipidemia Daughter     Arthritis Daughter     Breast cancer Neg Hx     Ovarian cancer Neg Hx      Social History     Tobacco Use    Smoking status: Never Smoker    Smokeless tobacco: Never Used   Substance Use Topics    Alcohol use: No     Frequency: Monthly or less     Drinks per session: 1 or 2     Binge frequency: Never    Drug use: No     Review of Systems   Constitutional: Negative for chills and fever.        (+) lethargic   HENT: Negative for congestion, rhinorrhea and sore throat.    Eyes: Negative for visual disturbance.   Respiratory: Negative for shortness of breath.    Cardiovascular: Negative for chest pain.   Gastrointestinal: Negative for abdominal pain, diarrhea, nausea and vomiting.   Genitourinary: Negative  for dysuria.   Musculoskeletal: Negative for back pain.   Skin: Negative for rash.   Neurological: Positive for weakness.       Physical Exam     Initial Vitals [07/29/20 0913]   BP Pulse Resp Temp SpO2   111/79 99 18 97.7 °F (36.5 °C) 95 %      MAP       --         Physical Exam    Nursing note and vitals reviewed.  Constitutional: She is not diaphoretic. No distress.   Ill appearing elderly female, weak   HENT:   Head: Normocephalic and atraumatic.   Nose: Nose normal.   Eyes: EOM are normal. Pupils are equal, round, and reactive to light.   Neck: Normal range of motion. No JVD present.   Cardiovascular: Normal rate, regular rhythm and normal heart sounds.   No murmur heard.  Pulmonary/Chest: Breath sounds normal. No stridor. No respiratory distress. She has no wheezes. She has no rales.   Abdominal: Soft. Bowel sounds are normal. She exhibits no distension. There is no abdominal tenderness.   Musculoskeletal: Normal range of motion. No tenderness or edema.   Neurological: She is alert and oriented to person, place, and time. No cranial nerve deficit.   Weak, symmetric strength in all extremities, no focal deficits noted   Skin: Skin is warm and dry. Capillary refill takes less than 2 seconds. No rash noted. No erythema.         ED Course   Procedures  Labs Reviewed   CBC W/ AUTO DIFFERENTIAL - Abnormal; Notable for the following components:       Result Value    WBC 17.28 (*)     RBC 3.77 (*)     Hemoglobin 11.5 (*)     Hematocrit 35.4 (*)     RDW 16.5 (*)     Immature Granulocytes 0.6 (*)     Gran # (ANC) 14.6 (*)     Immature Grans (Abs) 0.10 (*)     Gran% 84.7 (*)     Lymph% 8.9 (*)     All other components within normal limits   COMPREHENSIVE METABOLIC PANEL - Abnormal; Notable for the following components:    Chloride 113 (*)     CO2 16 (*)     Glucose 118 (*)     BUN, Bld 71 (*)     Calcium 8.6 (*)     Albumin 2.6 (*)     Alkaline Phosphatase 225 (*)     eGFR if  54 (*)     eGFR if non   47 (*)     All other components within normal limits   URINALYSIS, REFLEX TO URINE CULTURE - Abnormal; Notable for the following components:    Leukocytes, UA 2+ (*)     All other components within normal limits    Narrative:     Specimen Source->Urine   LIPASE - Abnormal; Notable for the following components:    Lipase 3 (*)     All other components within normal limits   URINALYSIS MICROSCOPIC - Abnormal; Notable for the following components:    WBC, UA 10 (*)     Non-Squam Epith 1 (*)     All other components within normal limits    Narrative:     Specimen Source->Urine   MAGNESIUM   TSH   APTT   PROTIME-INR   SARS-COV-2 RNA AMPLIFICATION, QUAL        ECG Results          EKG 12-lead (Final result)  Result time 07/29/20 22:20:06    Final result by Interface, Lab In [facility] (07/29/20 22:20:06)                 Narrative:    Test Reason : R53.1,    Vent. Rate : 094 BPM     Atrial Rate : 094 BPM     P-R Int : 152 ms          QRS Dur : 080 ms      QT Int : 354 ms       P-R-T Axes : 014 006 021 degrees     QTc Int : 442 ms    Normal sinus rhythm  Nonspecific T wave abnormality  Abnormal ECG  When compared with ECG of 06-AUG-2019 09:58,  Significant changes have occurred  Confirmed by Alexandra KELLEY, Solo JOSEPH ([id number]) on 7/29/2020 10:19:54 PM    Referred By: [email or web address]   SELF           Confirmed By:Solo Morris MD                            Imaging Results          X-Ray Chest AP Portable (Final result)  Result time 07/29/20 09:47:26    Final result by Simone Vaughn MD (07/29/20 09:47:26)                 Impression:      Moderate-sized left pleural effusion, increased.    Mild right basilar atelectasis and/or infiltrate, new.    Short-term PA and lateral chest follow-up could be performed to ensure resolution.      Electronically signed by: Simone Vauhgn MD  Date:    07/29/2020  Time:    09:47             Narrative:    EXAMINATION:  XR CHEST AP PORTABLE    CLINICAL HISTORY:  weakness;    TECHNIQUE:  Single  frontal view of the chest was performed.    COMPARISON:  06/09/2020    FINDINGS:  Right central line tip stable.  Moderate-sized left pleural effusion.  Mild right basilar lung opacities.  Heart size stable.  No gross pneumothorax.                                            Scribe Attestation:   Scribe #1: I performed the above scribed service and the documentation accurately describes the services I performed. I attest to the accuracy of the note.      EKG - nsr, rate 94 bpm, no ischemic pattern noted, no STEMI.    Pt presenting with weakness initially, no asymmetric or focal deficits noted, doubt CVA/TIA at this point.  Pt improving with IVF bolus, e/o leukocytosis, possible infectious cause vs. Recent neulasta injection.  Will treat with keflex after discusison with Dr. Singh.  Pt with stable vitals otherwise noting improved mentation and movements upon reassessment.  Discussed diagnosis and further treatment with patient and , including f/u with heme/onc in the next week.  Return precautions given and all questions answered.  Patient and  in understanding of plan.  Pt discharged to home improved and stable.                              Clinical Impression:       ICD-10-CM ICD-9-CM   1. Fatigue, unspecified type  R53.83 780.79   2. Weakness  R53.1 780.79   3. Urinary tract infection without hematuria, site unspecified  N39.0 599.0             ED Disposition Condition    Discharge Stable        ED Prescriptions     Medication Sig Dispense Start Date End Date Auth. Provider    cephALEXin (KEFLEX) 500 MG capsule Take 1 capsule (500 mg total) by mouth 2 (two) times daily. for 5 days 10 capsule 7/29/2020 8/3/2020 Fna Lisa MD    cephALEXin (KEFLEX) 250 mg/5 mL suspension Take 10 mLs (500 mg total) by mouth 2 (two) times a day. for 7 days 140 mL 7/29/2020 8/5/2020 Fan Lisa MD        Follow-up Information     Follow up With Specialties Details Why Contact Info    [facility] Go to  If symptoms worsen [place] [id number] [phone]                      I, Fan Lisa M.D., personally performed the services described in this documentation. All medical record entries made by the scribe were at my direction and in my presence. I have reviewed the chart and agree that the record reflects my personal performance and is accurate and complete.               Fan Lisa MD  07/30/20 1152

## 2020-07-30 ENCOUNTER — OFFICE VISIT (OUTPATIENT)
Dept: HEMATOLOGY/ONCOLOGY | Facility: CLINIC | Age: 70
End: 2020-07-30
Payer: COMMERCIAL

## 2020-07-30 ENCOUNTER — DOCUMENTATION ONLY (OUTPATIENT)
Dept: HEMATOLOGY/ONCOLOGY | Facility: CLINIC | Age: 70
End: 2020-07-30

## 2020-07-30 DIAGNOSIS — C25.0 MALIGNANT NEOPLASM OF HEAD OF PANCREAS: Primary | ICD-10-CM

## 2020-07-30 PROCEDURE — 1159F PR MEDICATION LIST DOCUMENTED IN MEDICAL RECORD: ICD-10-PCS | Mod: ,,, | Performed by: INTERNAL MEDICINE

## 2020-07-30 PROCEDURE — 1159F MED LIST DOCD IN RCRD: CPT | Mod: ,,, | Performed by: INTERNAL MEDICINE

## 2020-07-30 PROCEDURE — 1101F PR PT FALLS ASSESS DOC 0-1 FALLS W/OUT INJ PAST YR: ICD-10-PCS | Mod: CPTII,,, | Performed by: INTERNAL MEDICINE

## 2020-07-30 PROCEDURE — 99215 OFFICE O/P EST HI 40 MIN: CPT | Mod: 95,,, | Performed by: INTERNAL MEDICINE

## 2020-07-30 PROCEDURE — 99215 PR OFFICE/OUTPT VISIT, EST, LEVL V, 40-54 MIN: ICD-10-PCS | Mod: 95,,, | Performed by: INTERNAL MEDICINE

## 2020-07-30 PROCEDURE — 1101F PT FALLS ASSESS-DOCD LE1/YR: CPT | Mod: CPTII,,, | Performed by: INTERNAL MEDICINE

## 2020-07-30 NOTE — PROGRESS NOTES
Received referral from the clinic that patient was wanting to transition to home hospice service. Spoke with  Buddy. They would like referral to [facility] hospice. Spoke with Heide from [facility]. Provided her with the number for the  and information for the referral. She will contact the . Requested to have admit as soon as possible.

## 2020-07-30 NOTE — PROGRESS NOTES
Subjective:      The patient location is: home  The chief complaint leading to consultation is: pancreas cancer    Visit type: audiovisual    Face to Face time with patient: 40 minutes of total time spent on the encounter, which includes face to face time and non-face to face time preparing to see the patient (eg, review of tests), Obtaining and/or reviewing separately obtained history, Documenting clinical information in the electronic or other health record, Independently interpreting results (not separately reported) and communicating results to the patient/family/caregiver, or Care coordination (not separately reported).         Each patient to whom he or she provides medical services by telemedicine is:  (1) informed of the relationship between the physician and patient and the respective role of any other health care provider with respect to management of the patient; and (2) notified that he or she may decline to receive medical services by telemedicine and may withdraw from such care at any time.    Notes:    Patient ID: Mercedes Aviles is a 70 y.o. female.    Chief Complaint: Pancreatic Cancer  Mrs. Aviles is a 70 year old female with HTN, HPL,. Menier's disease who underwent a Nissen Fundoplication and laparoscopic cholecystectomy on 4/21/2020. She presented to ED on 4/25/2020 with nausea and and temp of 99.4. Right upper quadrant ultrasound did not reveal ductal dilation or stones.  She then returned back to ED on 5/8/2020 with jaundice and was noted to have lab with tbili 12, ,  Alkphos 1641.  She underwent Ct scans on 5/8/2020 which revealed soft tissue infiltrative mass, measuring 5.3 cm X 2.8 X 4.3 cm, centered at the ute hepatis that appears to surround the CBD producing CBD as well as intrahepatic dilation. There is encasement of the SMA and celiac branches. It does not appear to arise from the pancreas but infiltrative pancreas neoplasm cannot be excluded. Lymph nodes noted in rigt  upper quadrant/ute hepatis. Also stomach wall thickening, venous structures within left hepatic lobe are diminutive raising the possibility of stricture due to infiltrative spread of neoplasm or possible arterial thrombus. Focus noted in right ovary measuring 1.5 cm, appears non specific.  She underwent EUS on 5/11/2020 revealed pancreatic parenchymal abnormalities in the genu of the pancreas, these include 2X 3 cm area / mass and diffuse dilatation of intra hepatic bile ducts. PAth from this procedure revealed atypical groups of ductal cells in a background of chronic pancreatitis.     She then underwent MRI MRCP on 5/12/2020, which revealed biliary duct dilatation with abrupt severe narrowing or occlusion of CBC near the ampulla of vater at the level of pancreatic head. Also infiltrative soft tissue within ute hepatis and retroperitoneum surrounding the celiac and SMA remains nonspecific, differential includes pancreas versus ampullary neoplasm, significant narrowing of portal vein within the infiltrative mass in ute hepatis, small amount of ascitis.  ERCP on 5/13/2020 and CHD stricture was noted which was brushed and stent placed, also stent placed in PD. Path was negative for malignancy     She then underwent a repeat EUS on 5/20/2020 and was noted to have an abnormal area of poorly defined, hypoechoic, soft tissue density in the area of celiac trunk (actually encasing the vessel) measuring about 2.5 cm.   She also underwent ERCP on same day and was noted to have a single stricture in the CHD which was brushed X 3 and stent was placed.   This final FNA from above reveals pancreatic ductal adenocarcinoma with perineural invasion.     Reviewed case in Upper GI conference and consensus is that her tumor is unresectable and plan was to proceed with palliative chemo     She is now on FOLFOX which started on 6/25/2020     She also had a PEG tube placed on 6/25/2020     HPI She has received 3 cycles of FOLFOX.  She is now home and is non verbal, she has not been talking and unable to do anything. She has declined significantly since visit with me 2 weeks ago, She went to the ER on [facility] yesterday    Review of Systems   Constitutional: Positive for appetite change, fatigue and unexpected weight change.   HENT: Negative for mouth sores.    Eyes: Negative for visual disturbance.   Respiratory: Positive for shortness of breath. Negative for cough.    Cardiovascular: Negative for chest pain.   Gastrointestinal: Positive for abdominal pain and diarrhea.   Genitourinary: Negative for frequency.   Musculoskeletal: Positive for back pain.   Integumentary:  Negative for rash.   Neurological: Positive for weakness. Negative for headaches.   Hematological: Negative for adenopathy.   Psychiatric/Behavioral: Positive for behavioral problems and decreased concentration. The patient is not nervous/anxious.    All other systems reviewed and are negative.        Objective:      Physical Exam        Assessment:       1. Malignant neoplasm of head of pancreas        Plan:        1, She has significantly declined and not a candidate for chemo. Extensively discussed with  and daughter on televisit today along with Dr. Anna.  notes that patient has told him that she does not want to do treatment any more. Patient herself is non verbal at this time, although she is awake.  We discussed hospice and they are agreeable, will set up today. He will also DC tube feeds.    Above care plan was discussed with patient's , son and daughter and all questions were addressed to their satisfaction

## 2020-07-31 ENCOUNTER — TELEPHONE (OUTPATIENT)
Dept: PSYCHIATRY | Facility: CLINIC | Age: 70
End: 2020-07-31

## 2020-07-31 NOTE — TELEPHONE ENCOUNTER
Spoke with Patient's  briefly by video phone. Patient sleeping. Hospice was there currently conducting as evaluation.     Patient's  still adjusting to need for hospice but seems to be in a healthier place with acceptance of his wife no longer being able to receive chemotherapy. Patient willing to totally work with Hospice for his wife's needs.     No follow up with Psych scheduled as patient is transitioning to Hospice. I encouraged patient's  to reach out to me if needed and seeking meeting with Hospice counselor as needed.     Time on call: 10 mins

## 2020-08-27 DIAGNOSIS — Z12.39 BREAST CANCER SCREENING: ICD-10-CM

## 2020-09-29 ENCOUNTER — PATIENT MESSAGE (OUTPATIENT)
Dept: INTERNAL MEDICINE | Facility: CLINIC | Age: 70
End: 2020-09-29

## 2020-10-05 ENCOUNTER — PATIENT MESSAGE (OUTPATIENT)
Dept: ADMINISTRATIVE | Facility: HOSPITAL | Age: 70
End: 2020-10-05

## 2021-09-14 NOTE — TELEPHONE ENCOUNTER
----- Message from Celina King MD sent at 2/9/2018  3:28 PM CST -----  Please call pt with normal pap but she did have yeast so have called in diflucan for her.  thanks  
Normal pap letter sent to patient.  
Yes

## 2021-11-28 NOTE — PROGRESS NOTES
[facility]  Palliative Medicine Clinic  Consult Note    Patient Name: Mercedes Aviles  MRN: [id number]  Referring Provider: Dr. Amanda Singh   Primary Care Physician: Lashawn Sims MD    Assessment/Plan:     Diagnoses and all orders for this visit:    Diarrhea, unspecified type    Malignant neoplasm of head of pancreas  -     Ambulatory referral/consult to Palliative Care    Situational depression    Nausea    Anorexia    Pain    Counseling regarding advance directives and goals of care    Encounter for palliative care    Other orders  -     escitalopram oxalate (LEXAPRO) 5 MG Tab; Take 1 tablet (5 mg total) by mouth nightly.    Impression: 70 y.o. year old female presenting with pancreatic ductal adenocarcinoma with perineural invasion. Tumor was found to unresectable and plan was to proceed with palliative chemo therapy. She is now s/p PEG tube for nutrtional support as she has struggled with diarrhea, nausea anorexia and fatigue. Palliative care was consulted to assist with adjustment to cancer diagnosis and goals of care.  Please see goals of care discussion below.  Medication reconciliation with education including symptom assessment per below   Depression: Situational depression due to disease adjustment and distressing symptom response.  Patient has taken Lexapro 5mg daily in past due to grief response due to er mother's death.    Anorexia/Nausea:  Secondary to chemotherapy and malignancy.  Patient is utilizing Zofran as instructed.  Some improvement starting with po. Patient is now taking Periactin.   Diarrhea with cramping:  Secondary to tube feeding and possible chemotherapy side effects.  Nutritionist is addressing Boast tube feeding with increase of water with some improvement being seen.  Reinstructed on bentyl ac & hs. Patient is beginning to take po liquids and successfully attempted small potion of ice cream yesterday.    Abdominal pain as per above:  Dr. Singh initiated low dose  Roxanol trial.  Pain at PEG tube sire has resolved    Plan /Recommendations:  1) Follow-up in [facility] weeks in [facility]  2) Restart Lexapro 5mg po at hs.  Ordered.    3) agree with zofran, bentyl and periactin.  Patient not utilizing other antimetics  4) Agree with trial of Roxanol prn in lieu of Tramadol as per Dr Singh.  Patient and  report pain needs are only occasional and prn   5) Will discuss with MEÑO CORDOVAW.  Patient and her  could benefit from oncology psychologist support.  Dr. Singh has offered this support    Thank you for your consult. I will follow along with you. Please call [phone] with questions.     Subjective:     HPI: Patient is a 70 y.o. year old female presenting with HTN, HPL,. Menier's disease who underwent a Nissen Fundoplication and laparoscopic cholecystectomy on 4/21/2020. She presented to ED on 4/25/2020 with nausea and and temp of 99.4. Right upper quadrant ultrasound did not reveal ductal dilation or stones.  She then returned back to ED on 5/8/2020 with jaundice and was noted to have lab with tbili 12, ,  Alkphos 1641.  She underwent Ct scans on 5/8/2020 which revealed soft tissue infiltrative mass, measuring 5.3 cm X 2.8 X 4.3 cm, centered at the ute hepatis that appears to surround the CBD producing CBD as well as intrahepatic dilation. There is encasement of the SMA and celiac branches. She then underwent MRI MRCP on 5/12/2020, which revealed biliary duct dilatation with abrupt severe narrowing or occlusion of CBC near the ampulla of vater at the level of pancreatic head. Also infiltrative soft tissue within ute hepatis and retroperitoneum surrounding the celiac and SMA remains nonspecific, differential includes pancreas versus ampullary neoplasm, significant narrowing of portal vein within the infiltrative mass in ute hepatis, small amount of ascities. ERCP on 5/13/2020 and CHD stricture, stent was placed in PD. Pt had repeat EUS on 5/20/2020  and was noted to have an abnormal area of poorly defined, hypoechoic, soft tissue density in the area of celiac trunk. This was followed upERCP on same day and was noted to have a single stricture in the CHD which was brushed X 3 and stent was placed.   The final FNA revealed pancreatic ductal adenocarcinoma with perineural invasion. Tumor was found to unresectable and plan was to proceed with palliative chemo.  Pt received first FOLFOX and 2 days post chemo she started with fatigue, dry heaves, diarrhea X lasted 3 days.Pt is now s/p PEG tube, has seen nutrition,  is slowly working towards goal nutrition. She now completed cycle 2 of Folfox only chemo due to her previous response of her first cycle of chemo . Palliative care was consulted to assist with adjustment to cancer diagnosis and goals of care      Advance Care Planning   Review of Symptoms    Symptom Assessment (ESAS 0-10 Scale)  Pain:  0  Dyspnea:  0  Anxiety:  3  Depression:  4  Anorexia:  5  Fatigue:  5  Insomnia:  0  Restlessness:  0  Agitation:  0     CAM / Delirium:  Negative  Constipation:  Negative  Diarrhea:  Positive    Bowel Management Plan (BMP):  Yes      Comments:  Patient is having intermittent nausea and diarrhea which is currently responding to nutritionist changes in tube feeding to include increased water intake as they directed.            OME in 24 hours:  0    ECOG Performance Status Grade:  3 - Confined to bed or chair 50% of waking hours    Advanced Directives:  Living Will:  Yes.   Copy on chart: No    LaPOST: No    Do Not Resuscitate Status: No    Medical Power of : Yes     Agent's Name:  Buddy Aviles- .  Agent's Contact Number:  [phone]    Decision Making:  Patient answered questions and Family answered questions    Living Arrangements:  Lives with spouse    Psychosocial/Cultural:   with adult children.  Patient's  is primary caregiver. He is employed by [employer] and is  "currently working from home.  Patient's dght assist him in the caregiving needs    Spiritual:  F - Shazia and Belief:  Yazidism shazia  I - Importance:  Not specified   A - Address in Care:  [facility]     Time-Based Charting:  Yes  Symptom Assessment: 30 minutes  Advance Care Plannin minutes  Goals of Care: 20 minutes    Total Time Spent: 70 minutes    [facility]  I engaged the patient and her  in a conversation about advance care planning and we specifically addressed what the goals of care would be moving forward, in light of the patient's pancreatic cancer diagnosis and current treatment plan.  Both the patient and her  were engaged in the discussion.  Patient stated that she had felt better this morning, " so I did a lot and now I need to get in bed and rest."   We explored the patient's values and preferences for future care.  The patient and her  endorses that what is most important right now is to focus on symptom mgm, improving the patient's quality of life, especially her nutritional status so that she may eat normally and not by PEG tube and continue her chemotherapy for treatment. Accordingly, we have decided that the best plan to meet the patient's goals includes continuing with treatment.  Patient did state that sometimes, " I don't know why we are doing all of this but I will try and see how it goes."  Living Will and Medical Power of   Advance care planning was discussed.  Patient and her  reported that they have completed both the living will and a medical power of .  SCOTTIE hagan patient's  Buddy as her healthcare proxy.  She reported her living will states that she does not want life support or CPR if there is no further treatment or hope for recovery.  Patient and her  were instructed to bring the copies of the documents to the clinic with them so that copies can be made to be placed in the EMR.  They agreed and will do so " on next visit.    I spent a total of 20  minutes discussing advanced care planning with the patient.    Past Medical History:   Diagnosis Date    Abdominal pain     Anorexia     Anxiety     Depression     Fatigue     GERD (gastroesophageal reflux disease)     Hyperlipidemia     Hypertension     Jaundice     Meniere disease     Nausea     Pancreas cancer     Pancreatic insufficiency     Psoriasis     Weakness     Weight loss, unintentional      Past Surgical History:   Procedure Laterality Date    CARPAL TUNNEL RELEASE      right     COLONOSCOPY N/A 8/10/2018    Procedure: COLONOSCOPY;  Surgeon: Stiven Stoner MD;  Location: [facility] (4TH FLR);  Service: Endoscopy;  Laterality: N/A;  generated from previous   pt requesting this date.    EAR MASTOIDECTOMY W/ COCHLEAR IMPLANT W/ LANDMARK      ESOPHAGOGASTRODUODENOSCOPY N/A 2020    Procedure: EGD (ESOPHAGOGASTRODUODENOSCOPY);  Surgeon: Elijah Mae MD;  Location: [facility] (2ND FLR);  Service: Endoscopy;  Laterality: N/A;  EGD, probably duodenal stent/maybe PEG; Prefer Thursday.  Dr Mae/Dr Wang  Rapid Covid-19 test at 1030 -     FOOT SURGERY      GASTROSTOMY TUBE PLACEMENT      HYSTERECTOMY      in her 40's    INSERTION OF TUNNELED CENTRAL VENOUS CATHETER (CVC) WITH SUBCUTANEOUS PORT Right 2020    Procedure: BIVVNBXIS-HUNG-C-CATH, RIGHT POSS LEFT;  Surgeon: Raleigh Dye MD;  Location: [facility] OR 2ND FLR;  Service: General;  Laterality: Right;    LAPAROSCOPIC CHOLECYSTECTOMY      NISSEN FUNDOPLICATION      PEG TUBE REMOVAL  2020    tarsal tunnel      right foot     TONSILLECTOMY       Family History   Problem Relation Age of Onset    Psoriasis Maternal Grandmother     Hypertension Mother     Dementia Mother     Hypertension Sister     Hyperlipidemia Sister     Cancer Sister         uterus/cx    No Known Problems Son     Hyperlipidemia Daughter     Arthritis Daughter     Breast cancer Neg Hx      Ovarian cancer Neg Hx      Review of patient's allergies indicates:   Allergen Reactions    Celebrex [celecoxib] Anxiety     Elevated BP    Topiramate Anaphylaxis    Latex Rash    Simvastatin Rash       Medications:    Current Outpatient Medications:     amLODIPine (NORVASC) 5 MG tablet, TAKE 1 TABLET(5 MG) BY MOUTH EVERY DAY (Patient taking differently: Take 5 mg by mouth every morning. ), Disp: 90 tablet, Rfl: 3    betahistine HCl (BETAHISTINE, BULK, MISC), 12 mg by Misc.(Non-Drug; Combo Route) route every evening., Disp: , Rfl:     clonazePAM (KLONOPIN) 0.5 MG tablet, Take 0.5 mg by mouth every evening. , Disp: , Rfl:     cyproheptadine (PERIACTIN) 4 mg tablet, Take 1 tablet (4 mg total) by mouth 4 (four) times daily., Disp: 120 tablet, Rfl: 4    dicyclomine (BENTYL) 10 mg/5 mL syrup, Take 5 mLs (10 mg total) by mouth 4 (four) times daily before meals and nightly., Disp: 600 mL, Rfl: 6    doxycycline (VIBRA-TABS) 100 MG tablet, Take 1 tablet (100 mg total) by mouth 2 (two) times daily., Disp: 28 tablet, Rfl: 0    levothyroxine (SYNTHROID) 50 MCG tablet, TAKE 1 TABLET BY MOUTH EVERY DAY (Patient taking differently: Take 50 mcg by mouth before breakfast. TAKE 1 TABLET BY MOUTH EVERY DAY), Disp: 90 tablet, Rfl: 1    lidocaine-prilocaine (EMLA) cream, Apply topically as needed., Disp: 30 g, Rfl: 5    morphine 100 mg/5 mL (20 mg/mL) concentrated solution, Take 0.5 mLs (10 mg total) by mouth every 4 (four) hours as needed for Pain., Disp: 118 mL, Rfl: 0    ondansetron (ZOFRAN-ODT) 8 MG TbDL, Take 1 tablet (8 mg total) by mouth every 8 (eight) hours as needed., Disp: 60 tablet, Rfl: 6    pantoprazole (PROTONIX) 40 MG tablet, Take 40 mg by mouth once daily., Disp: , Rfl:     potassium chloride 10% (KAYCIEL) 20 mEq/15 mL oral solution, Take 30 mLs (40 mEq total) by mouth once daily., Disp: 3800 mL, Rfl: 6    promethazine (PHENERGAN) 25 MG tablet, Take 12.5 mg by mouth every 6 (six) hours as needed for  Nausea., Disp: , Rfl:     spironolactone (ALDACTONE) 25 MG tablet, Take 25 mg by mouth every morning. , Disp: , Rfl:     traMADoL (ULTRAM) 50 mg tablet, Take 1 tablet (50 mg total) by mouth every 6 (six) hours as needed for Pain., Disp: 90 tablet, Rfl: 0    atorvastatin (LIPITOR) 40 MG tablet, TAKE 1 TABLET(40 MG) BY MOUTH EVERY DAY (Patient not taking: No sig reported), Disp: 90 tablet, Rfl: 1    escitalopram oxalate (LEXAPRO) 5 MG Tab, Take 1 tablet (5 mg total) by mouth nightly., Disp: 30 tablet, Rfl: 11    HYDROmorphone (DILAUDID) 2 MG tablet, Take 1 tablet (2 mg total) by mouth every 3 (three) hours as needed for Pain. (Patient not taking: Reported on 7/14/2020), Disp: 120 tablet, Rfl: 0    lidocaine (LIDODERM) 5 %, Place 1 patch onto the skin once daily. Remove & Discard patch within 12 hours or as directed by MD (Patient not taking: Reported on 7/14/2020), Disp: 30 patch, Rfl: 0    lipase-protease-amylase 24,000-76,000-120,000 units (CREON) 24,000-76,000 -120,000 unit capsule, Take 1 capsule by mouth 3 (three) times daily with meals. (Patient not taking: Reported on 7/14/2020), Disp: 90 capsule, Rfl: 11    metoclopramide HCl (REGLAN) 5 MG tablet, Take 1 tablet (5 mg total) by mouth 3 (three) times daily. (Patient not taking: Reported on 7/14/2020), Disp: 90 tablet, Rfl: 1      Review of Systems   Constitutional: Positive for activity change, appetite change and fatigue.   Gastrointestinal: Positive for diarrhea.   Neurological: Positive for weakness.   Psychiatric/Behavioral: Positive for dysphoric mood.     Objective:     There were no vitals filed for this visit.      Physical Exam  HENT:      Head: Normocephalic.   Pulmonary:      Effort: Pulmonary effort is normal.   Neurological:      Mental Status: She is alert and oriented to person, place, and time.   Psychiatric:         Attention and Perception: Attention and perception normal.         Mood and Affect: Mood is depressed.         Behavior:  Behavior normal. Behavior is cooperative.         Thought Content: Thought content normal.         Cognition and Memory: Cognition and memory normal.         Judgment: Judgment normal.         Laboratory:   CBC:   WBC   Date Value Ref Range Status   07/01/2020 11.34 3.90 - 12.70 K/uL Final     RBC   Date Value Ref Range Status   07/01/2020 4.40 4.00 - 5.40 M/uL Final     Hemoglobin   Date Value Ref Range Status   07/01/2020 13.1 12.0 - 16.0 g/dL Final     Hematocrit   Date Value Ref Range Status   07/01/2020 41.0 37.0 - 48.5 % Final     Platelets   Date Value Ref Range Status   07/01/2020 199 150 - 350 K/uL Final     Mean Corpuscular Volume   Date Value Ref Range Status   07/01/2020 93 82 - 98 fL Final     Mean Corpuscular Hemoglobin   Date Value Ref Range Status   07/01/2020 29.8 27.0 - 31.0 pg Final     Mean Corpuscular Hemoglobin Conc   Date Value Ref Range Status   07/01/2020 32.0 32.0 - 36.0 g/dL Final     CMP:   Glucose   Date Value Ref Range Status   07/01/2020 138 (H) 70 - 110 mg/dL Final     Calcium   Date Value Ref Range Status   07/01/2020 9.3 8.7 - 10.5 mg/dL Final     Albumin   Date Value Ref Range Status   07/01/2020 2.9 (L) 3.5 - 5.2 g/dL Final     Total Protein   Date Value Ref Range Status   07/01/2020 6.8 6.0 - 8.4 g/dL Final     Sodium   Date Value Ref Range Status   07/01/2020 140 136 - 145 mmol/L Final     Potassium   Date Value Ref Range Status   07/01/2020 2.7 (LL) 3.5 - 5.1 mmol/L Final     Comment:     Potassium critical result(s) called and verbal readback obtained from   Reynold Koenig.   by KAM1 07/01/2020 16:56       CO2   Date Value Ref Range Status   07/01/2020 25 23 - 29 mmol/L Final     Chloride   Date Value Ref Range Status   07/01/2020 104 95 - 110 mmol/L Final     BUN, Bld   Date Value Ref Range Status   07/01/2020 29 (H) 8 - 23 mg/dL Final     Creatinine   Date Value Ref Range Status   07/01/2020 0.9 0.5 - 1.4 mg/dL Final     Alkaline Phosphatase   Date Value Ref Range Status    07/01/2020 177 (H) 55 - 135 U/L Final     ALT   Date Value Ref Range Status   07/01/2020 11 10 - 44 U/L Final     AST   Date Value Ref Range Status   07/01/2020 20 10 - 40 U/L Final     Total Bilirubin   Date Value Ref Range Status   07/01/2020 1.1 (H) 0.1 - 1.0 mg/dL Final     Comment:     For infants and newborns, interpretation of results should be based  on gestational age, weight and in agreement with clinical  observations.  Premature Infant recommended reference ranges:  Up to 24 hours.............<8.0 mg/dL  Up to 48 hours............<12.0 mg/dL  3-5 days..................<15.0 mg/dL  6-29 days.................<15.0 mg/dL        20 min visit spent in  face to face discussion of goals of care/advance care planning  50min spent in symptom assessment, medication reconcilliation, coordination of care and emotional support.    GLENROY Johnson, CNS, ACHPN  Palliative Medicine  [facility]     normal S1, S2 heard normal S1, S2 heard

## 2022-08-01 NOTE — TELEPHONE ENCOUNTER
Spoke with the patient's . He stated that she is not having dysplasia, her issue is early satiety and poor oral intake. I think that if her EGD doesn't showed nay structural problem she may benefit from a gastrostomy tube. She need to be schedule for an EGD next week with possible PEG if Dr Singh agree. She will need Nutritional consult.   yes

## 2022-11-01 ENCOUNTER — TELEPHONE (OUTPATIENT)
Dept: HEMATOLOGY/ONCOLOGY | Facility: CLINIC | Age: 72
End: 2022-11-01
Payer: COMMERCIAL

## 2022-11-01 NOTE — TELEPHONE ENCOUNTER
"----- Message from Romelia Blair sent at 11/1/2022  3:20 PM CDT -----  Consult/Advisory:           Name Of Caller:  Funmi Beckford with [facility]    Contact Preference?: [phone]    What is the nature of the call?: requesting the pathology report for pt's diagnosis in May 2020           Additional Notes:  "Thank you for all that you do for our patients'"     "

## 2022-11-01 NOTE — TELEPHONE ENCOUNTER
Spoke with chan with ;s office.  Advised her to contact medical records for any documentation needed.    Message routed to dr garcia

## 2024-09-08 NOTE — Clinical Note
Schedule CBC,CMP, CA 19-9 today.Needs to see General Surgery ASAP for PORT.Please get auth for FOLFIRINOX ASAP. And see me start after the above with CBC,CMP, COVID 22-Oct-2024

## (undated) DEVICE — TRAY MINOR GEN SURG

## (undated) DEVICE — DRAPE THYROID WITH ARMBOARD

## (undated) DEVICE — GOWN SURGICAL X-LARGE

## (undated) DEVICE — SUT VICRYL 3-0 27 SH

## (undated) DEVICE — ELECTRODE BLADE INSULATED 1 IN

## (undated) DEVICE — DRESSING TELFA STRL 4X3 LF

## (undated) DEVICE — APPLICATOR CHLORAPREP ORN 26ML

## (undated) DEVICE — DRAPE C ARM 42 X 120 10/BX

## (undated) DEVICE — ADHESIVE DERMABOND ADVANCED

## (undated) DEVICE — DRESSING TRANS 4X4 TEGADERM

## (undated) DEVICE — SEE MEDLINE ITEM 157117

## (undated) DEVICE — BLADE SURG CARBON STEEL SZ11

## (undated) DEVICE — SET DECANTER MEDICHOICE

## (undated) DEVICE — SUT 2-0 VICRYL / SH (J417)

## (undated) DEVICE — SOL NACL 0.9% INJ PF/50151

## (undated) DEVICE — DRAPE INCISE IOBAN 2 23X17IN

## (undated) DEVICE — ELECTRODE REM PLYHSV RETURN 9

## (undated) DEVICE — SUT MCRYL PLUS 4-0 PS2 27IN